# Patient Record
Sex: MALE | Race: WHITE | NOT HISPANIC OR LATINO | Employment: OTHER | URBAN - METROPOLITAN AREA
[De-identification: names, ages, dates, MRNs, and addresses within clinical notes are randomized per-mention and may not be internally consistent; named-entity substitution may affect disease eponyms.]

---

## 2017-01-02 RX ORDER — ATORVASTATIN CALCIUM 10 MG/1
TABLET, FILM COATED ORAL
Qty: 30 TABLET | Refills: 11 | Status: SHIPPED | OUTPATIENT
Start: 2017-01-02 | End: 2017-12-20 | Stop reason: SDUPTHER

## 2017-01-02 RX ORDER — OMEPRAZOLE 40 MG/1
CAPSULE, DELAYED RELEASE ORAL
Qty: 30 CAPSULE | Refills: 11 | Status: SHIPPED | OUTPATIENT
Start: 2017-01-02 | End: 2017-12-20 | Stop reason: SDUPTHER

## 2017-01-04 ENCOUNTER — LAB VISIT (OUTPATIENT)
Dept: LAB | Facility: HOSPITAL | Age: 77
End: 2017-01-04
Attending: INTERNAL MEDICINE
Payer: MEDICARE

## 2017-01-04 DIAGNOSIS — E78.5 HYPERLIPIDEMIA, UNSPECIFIED HYPERLIPIDEMIA TYPE: ICD-10-CM

## 2017-01-04 DIAGNOSIS — D64.9 ANEMIA, UNSPECIFIED TYPE: ICD-10-CM

## 2017-01-04 DIAGNOSIS — Z12.5 SPECIAL SCREENING FOR MALIGNANT NEOPLASM OF PROSTATE: ICD-10-CM

## 2017-01-04 DIAGNOSIS — R73.09 ELEVATED GLUCOSE: ICD-10-CM

## 2017-01-04 LAB
ALBUMIN SERPL BCP-MCNC: 3.9 G/DL
ALP SERPL-CCNC: 58 U/L
ALT SERPL W/O P-5'-P-CCNC: 25 U/L
ANION GAP SERPL CALC-SCNC: 9 MMOL/L
AST SERPL-CCNC: 23 U/L
BASOPHILS # BLD AUTO: 0.05 K/UL
BASOPHILS NFR BLD: 0.9 %
BILIRUB SERPL-MCNC: 0.7 MG/DL
BUN SERPL-MCNC: 14 MG/DL
CALCIUM SERPL-MCNC: 9.5 MG/DL
CHLORIDE SERPL-SCNC: 106 MMOL/L
CHOLEST/HDLC SERPL: 3.4 {RATIO}
CO2 SERPL-SCNC: 26 MMOL/L
COMPLEXED PSA SERPL-MCNC: 0.42 NG/ML
CREAT SERPL-MCNC: 1.1 MG/DL
DIFFERENTIAL METHOD: ABNORMAL
EOSINOPHIL # BLD AUTO: 0.2 K/UL
EOSINOPHIL NFR BLD: 2.7 %
ERYTHROCYTE [DISTWIDTH] IN BLOOD BY AUTOMATED COUNT: 14 %
EST. GFR  (AFRICAN AMERICAN): >60 ML/MIN/1.73 M^2
EST. GFR  (NON AFRICAN AMERICAN): >60 ML/MIN/1.73 M^2
ESTIMATED AVG GLUCOSE: 97 MG/DL
GLUCOSE SERPL-MCNC: 97 MG/DL
HBA1C MFR BLD HPLC: 5 %
HCT VFR BLD AUTO: 44.1 %
HDL/CHOLESTEROL RATIO: 29 %
HDLC SERPL-MCNC: 210 MG/DL
HDLC SERPL-MCNC: 61 MG/DL
HGB BLD-MCNC: 15.3 G/DL
LDLC SERPL CALC-MCNC: 124.4 MG/DL
LYMPHOCYTES # BLD AUTO: 1.7 K/UL
LYMPHOCYTES NFR BLD: 29.2 %
MCH RBC QN AUTO: 32.4 PG
MCHC RBC AUTO-ENTMCNC: 34.7 %
MCV RBC AUTO: 93 FL
MONOCYTES # BLD AUTO: 0.3 K/UL
MONOCYTES NFR BLD: 5.3 %
NEUTROPHILS # BLD AUTO: 3.6 K/UL
NEUTROPHILS NFR BLD: 61.7 %
NONHDLC SERPL-MCNC: 149 MG/DL
PLATELET # BLD AUTO: 175 K/UL
PMV BLD AUTO: 11.2 FL
POTASSIUM SERPL-SCNC: 4.7 MMOL/L
PROT SERPL-MCNC: 7.4 G/DL
RBC # BLD AUTO: 4.72 M/UL
SODIUM SERPL-SCNC: 141 MMOL/L
TRIGL SERPL-MCNC: 123 MG/DL
WBC # BLD AUTO: 5.83 K/UL

## 2017-01-04 PROCEDURE — 84153 ASSAY OF PSA TOTAL: CPT

## 2017-01-04 PROCEDURE — 80061 LIPID PANEL: CPT

## 2017-01-04 PROCEDURE — 80053 COMPREHEN METABOLIC PANEL: CPT

## 2017-01-04 PROCEDURE — 85025 COMPLETE CBC W/AUTO DIFF WBC: CPT

## 2017-01-04 PROCEDURE — 83036 HEMOGLOBIN GLYCOSYLATED A1C: CPT

## 2017-01-04 PROCEDURE — 36415 COLL VENOUS BLD VENIPUNCTURE: CPT

## 2017-01-11 ENCOUNTER — OFFICE VISIT (OUTPATIENT)
Dept: INTERNAL MEDICINE | Facility: CLINIC | Age: 77
End: 2017-01-11
Payer: MEDICARE

## 2017-01-11 VITALS
HEART RATE: 61 BPM | BODY MASS INDEX: 30.16 KG/M2 | HEIGHT: 74 IN | SYSTOLIC BLOOD PRESSURE: 134 MMHG | WEIGHT: 235 LBS | DIASTOLIC BLOOD PRESSURE: 84 MMHG

## 2017-01-11 DIAGNOSIS — E78.5 HYPERLIPIDEMIA, UNSPECIFIED HYPERLIPIDEMIA TYPE: ICD-10-CM

## 2017-01-11 DIAGNOSIS — F32.A DEPRESSION, UNSPECIFIED DEPRESSION TYPE: ICD-10-CM

## 2017-01-11 DIAGNOSIS — K21.9 GASTROESOPHAGEAL REFLUX DISEASE, ESOPHAGITIS PRESENCE NOT SPECIFIED: ICD-10-CM

## 2017-01-11 DIAGNOSIS — I10 ESSENTIAL HYPERTENSION: Primary | ICD-10-CM

## 2017-01-11 PROCEDURE — 99214 OFFICE O/P EST MOD 30 MIN: CPT | Mod: S$PBB,,, | Performed by: INTERNAL MEDICINE

## 2017-01-11 PROCEDURE — 99213 OFFICE O/P EST LOW 20 MIN: CPT | Mod: PBBFAC | Performed by: INTERNAL MEDICINE

## 2017-01-11 PROCEDURE — 82270 OCCULT BLOOD FECES: CPT | Mod: PBBFAC | Performed by: INTERNAL MEDICINE

## 2017-01-11 PROCEDURE — 99999 PR PBB SHADOW E&M-EST. PATIENT-LVL III: CPT | Mod: PBBFAC,,, | Performed by: INTERNAL MEDICINE

## 2017-01-11 RX ORDER — CITALOPRAM 20 MG/1
20 TABLET, FILM COATED ORAL DAILY
Qty: 30 TABLET | Refills: 11 | Status: SHIPPED | OUTPATIENT
Start: 2017-01-11 | End: 2018-01-12

## 2017-01-11 NOTE — PROGRESS NOTES
Subjective:       Patient ID: Adrian Healy is a 76 y.o. male.    Chief Complaint: Follow-up (yearly follow up)    HPI Comments: History of present illness: Patient here for annual follow-up of medical problems including dyslipidemia and hypertension.  He has stable ALLERGIES and stable depression.  He says he feels well and has no active or acute complaints.  No GI or  complaints.  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  Tolerating his meds well.  Labs were stable.  He has had a flu shot and pneumonia vaccine.    Review of Systems   Constitutional: Negative for chills, fatigue, fever and unexpected weight change.   HENT: Negative for ear pain and sore throat.    Eyes: Negative for pain and visual disturbance.   Respiratory: Negative for cough, shortness of breath and wheezing.    Cardiovascular: Negative for chest pain and leg swelling.   Gastrointestinal: Negative for abdominal pain, constipation, diarrhea, nausea and vomiting.   Genitourinary: Negative for difficulty urinating, frequency and hematuria.        Once per night nocturia   Musculoskeletal: Negative for arthralgias, back pain, myalgias, neck pain and neck stiffness.   Skin: Negative for rash and wound.   Neurological: Negative for dizziness, numbness and headaches.   Hematological: Negative for adenopathy.   Psychiatric/Behavioral: Negative for dysphoric mood. The patient is not nervous/anxious.        Objective:      Physical Exam   Constitutional: He is oriented to person, place, and time. He appears well-developed and well-nourished. No distress.   HENT:   Head: Normocephalic and atraumatic.   Right Ear: External ear normal.   Left Ear: External ear normal.   Nose: Nose normal. No rhinorrhea.   Mouth/Throat: Oropharynx is clear and moist and mucous membranes are normal. No oropharyngeal exudate.   Eyes: Conjunctivae and EOM are normal. Pupils are equal, round, and reactive to light.   Neck: Normal range of motion. Neck supple.  No JVD present. No thyromegaly present.   No supraclavicular nodes palpated bilaterally.    Cardiovascular: Normal rate, regular rhythm, normal heart sounds and intact distal pulses.    No murmur heard.  Pulmonary/Chest: Effort normal and breath sounds normal. He has no wheezes. He has no rales.   Abdominal: Soft. Bowel sounds are normal. He exhibits no distension and no mass. There is no tenderness.   Genitourinary: Rectum normal. Rectal exam shows no tenderness and guaiac negative stool. Prostate is enlarged (mild). Prostate is not tender.   Musculoskeletal: Normal range of motion. He exhibits no edema or tenderness.   Lymphadenopathy:     He has no cervical adenopathy.        Right: No supraclavicular adenopathy present.        Left: No supraclavicular adenopathy present.   Neurological: He is alert and oriented to person, place, and time. He has normal reflexes. No cranial nerve deficit.   Reflex Scores:       Bicep reflexes are 2+ on the right side and 2+ on the left side.       Patellar reflexes are 2+ on the right side and 2+ on the left side.  Skin: Skin is warm and dry. No rash noted.   Psychiatric: He has a normal mood and affect. His behavior is normal. He does not exhibit a depressed mood.       Assessment:       1. Essential hypertension    2. Hyperlipidemia, unspecified hyperlipidemia type    3. Gastroesophageal reflux disease, esophagitis presence not specified    4. Depression, unspecified depression type        Plan:       Adrian was seen today for follow-up.    Diagnoses and all orders for this visit:    Essential hypertension    Hyperlipidemia, unspecified hyperlipidemia type    Gastroesophageal reflux disease, esophagitis presence not specified    Depression, unspecified depression type  Comments:  Stable on medication    Other orders  -     citalopram (CELEXA) 20 MG tablet; Take 1 tablet (20 mg total) by mouth once daily.        Follow up annually

## 2017-01-30 RX ORDER — CITALOPRAM 20 MG/1
TABLET, FILM COATED ORAL
Qty: 30 TABLET | Refills: 0 | Status: SHIPPED | OUTPATIENT
Start: 2017-01-30 | End: 2017-12-26 | Stop reason: SDUPTHER

## 2017-11-17 ENCOUNTER — HOSPITAL ENCOUNTER (EMERGENCY)
Facility: HOSPITAL | Age: 77
Discharge: HOME | End: 2017-11-17
Attending: FAMILY MEDICINE
Payer: MEDICARE

## 2017-11-17 VITALS
BODY MASS INDEX: 29.52 KG/M2 | RESPIRATION RATE: 18 BRPM | HEART RATE: 80 BPM | DIASTOLIC BLOOD PRESSURE: 89 MMHG | HEIGHT: 74 IN | SYSTOLIC BLOOD PRESSURE: 168 MMHG | TEMPERATURE: 99 F | OXYGEN SATURATION: 96 % | WEIGHT: 230 LBS

## 2017-11-17 DIAGNOSIS — S09.90XA HEAD TRAUMA: Primary | ICD-10-CM

## 2017-11-17 PROCEDURE — 99284 EMERGENCY DEPT VISIT MOD MDM: CPT

## 2017-11-17 PROCEDURE — 99283 EMERGENCY DEPT VISIT LOW MDM: CPT | Mod: ,,, | Performed by: PHYSICIAN ASSISTANT

## 2017-11-17 NOTE — ED TRIAGE NOTES
Fell Wednesday night striking the back of his head on the night stand.  Presents today with a headache and neck pain.      GENERAL: The patient is an elderly in no apparent distress. Alert and oriented x4.                                                HEENT: Head is normocephalic and atraumatic. Extraocular muscles are intact. Pupils are reactive to light and accommodation.  Right pupil round, however, left pupil is irregular shaped due to sticking a knife in his eye as a child.  States that he has normal vision out of his eye. Nares appeared normal. Mouth is well hydrated and without lesions. Mucous membranes are moist. Posterior pharynx clear of any exudate or lesions.    NECK: Supple. No carotid bruits. No lymphadenopathy or thyromegaly.    LUNGS: Clear to auscultation.    HEART: Regular rate and rhythm without murmur.     ABDOMEN: Soft, nontender, and nondistended. Positive bowel sounds. No hepatosplenomegaly was noted.     EXTREMITIES: Without any cyanosis, clubbing, rash, lesions or edema.     NEUROLOGIC: Cranial nerves II through XII are grossly intact.     PSYCHIATRIC: Flat affect, but denies suicidal or homicidal ideations.    SKIN: No ulceration or induration present.

## 2017-11-18 NOTE — ED PROVIDER NOTES
"Encounter Date: 11/17/2017       History     Chief Complaint   Patient presents with    Head Injury     pt states he hit his head on nightstand Wednesday night when he fell trying to put his pants on.  Pt states he woke up around 2am last night and has had headache since.  Denies LOC.      Patient is a 77-year-old male that presents the ED with head injury and neck stiffness.  Patient states on Wednesday, he was putting on his pants, leaned over, but fell forward and hit his head on the wooden table.  He is unsure of LOC but believes he did not have any.  He states this morning, he woke up with 10/10 "pounding" head pains that resolved after taking Tylenol ×2.  He has been having constant neck stiffness and pain that is worse with neck rotation.  He denies any fever or chills, current headache, dizziness, blurred vision, extremity paresthesias, difficulty ambulating.  He denies any blood thinner use.  He has no other complaints at this time.          Review of patient's allergies indicates:  No Known Allergies  Past Medical History:   Diagnosis Date    GERD (gastroesophageal reflux disease)     HEARING LOSS     Hyperlipidemia     Hypertension      Past Surgical History:   Procedure Laterality Date    EYE SURGERY       Family History   Problem Relation Age of Onset    Heart disease Brother      pacemaker     Social History   Substance Use Topics    Smoking status: Former Smoker     Packs/day: 1.00     Years: 30.00     Types: Cigarettes     Quit date: 12/12/1997    Smokeless tobacco: Former User     Quit date: 12/10/1997    Alcohol use 3.5 oz/week     7 Standard drinks or equivalent per week     Review of Systems   Constitutional: Negative for chills and fever.   HENT: Negative for nosebleeds and sore throat.    Eyes: Negative for photophobia and visual disturbance.   Respiratory: Negative for cough and shortness of breath.    Cardiovascular: Negative for chest pain.   Gastrointestinal: Negative for nausea. "   Endocrine: Negative for polyuria.   Genitourinary: Negative for dysuria and hematuria.   Musculoskeletal: Positive for neck pain and neck stiffness. Negative for back pain.   Skin: Negative for rash.   Neurological: Positive for headaches (resolved). Negative for dizziness, weakness and numbness.   Hematological: Does not bruise/bleed easily.       Physical Exam     Initial Vitals [11/17/17 1513]   BP Pulse Resp Temp SpO2   (!) 176/101 96 16 98.1 °F (36.7 °C) 97 %      MAP       126         Physical Exam    Vitals reviewed.  Constitutional: He appears well-developed and well-nourished. He is not diaphoretic. No distress.   HENT:   Head: Normocephalic and atraumatic.   Nose: Nose normal.   Eyes: Conjunctivae and EOM are normal.   Neck: Normal range of motion. No thyroid mass present. No muscular tenderness present. No neck rigidity.       Cardiovascular: Normal rate, regular rhythm and normal heart sounds. Exam reveals no friction rub.    No murmur heard.  Pulmonary/Chest: Breath sounds normal. No respiratory distress. He has no wheezes. He has no rales.   Abdominal: Soft. Bowel sounds are normal. He exhibits no distension. There is no tenderness.   Musculoskeletal: Normal range of motion.   Neurological: He is alert and oriented to person, place, and time. He has normal strength. No sensory deficit.   FROM of upper and lower extremities.  Normal finger to nose and rapid alternating hand movements.  Normal gait.  Negative pronator's and Romberg's.   Skin: Skin is warm and dry. No erythema.   Psychiatric: He has a normal mood and affect. Thought content normal.         ED Course   Procedures  Labs Reviewed - No data to display          Medical Decision Making:   History:   Old Medical Records: I decided to obtain old medical records.  Clinical Tests:   Radiological Study: Ordered and Reviewed    Imaging Results          CT Head Without Contrast (Final result)  Result time 11/17/17 19:10:30    Final result by Luis  SHARI Hutchinson MD (11/17/17 19:10:30)                 Impression:         No evidence of major vascular distribution infarct, hemorrhage, or calvarial fractures.    Cavum septum pellucidum, anatomical variant.  ______________________________________     Electronically signed by resident: AIXA PORTILLO MD  Date:     11/17/17  Time:    19:04            As the supervising and teaching physician, I personally reviewed the images and resident's interpretation and I agree with the findings.          Electronically signed by: Luis Hutchinson  Date:     11/17/17  Time:    19:10              Narrative:    CT head without contrast    11/17/17 18:30:00    Accession# [id number]    CLINICAL INDICATION: 77 year old M with  head trauma.    TECHNIQUE: Axial CT images obtained throughout the region of the head without the use of intravenous contrast. Axial, sagittal and coronal reconstructions were performed.    COMPARISON: No priors.    FINDINGS:    The ventricles are normal in size without evidence of hydrocephalus. Cavum septum pellucidum is noted, anatomical variant. Prominent sulcal space noted in the parasagittal anterior left frontal lobe.    The brain appears within normal limits. No parenchymal mass, hemorrhage, edema or major vascular distribution infarct.    No extra-axial blood or fluid collections. There are postoperative changes of cataract surgery bilaterally.    The cranium appears intact. Mastoid air cells and paranasal sinuses are essentially clear. Incidentally noted calcified nodule in the left posterior scalp soft tissues.                                 APC / Resident Notes:   No cervical spine bony tenderness.  He has bilateral neck muscular tenderness.  Limited ROM secondary to pain.  Skin without abnormality.  I have considered but do not suspect cervical fracture or dislocation.  I will CT his head given that he is unsure of LOC and his age, although he presents with normal neuro exam.    CT head without acute  process.    Patient updated with results.  I will treat him for bony contusion of his head and neck stiffness secondary to muscular strain.  He is to continue OTC acetaminophen.  He would not like any muscle relaxers at this time.  He is to follow-up with his PCP if his symptoms do not improve.  Return to ED precaution given.  All questions answered.  Patient is comfortable with plan and stable for discharge.  I have reviewed patient's chart and discussed this case with my supervising M.BRUNO.              ED Course      Clinical Impression:   The encounter diagnosis was Head trauma.    Disposition:   Disposition: Discharged  Condition: Stable                        Elayne Lund PA-C  11/17/17 2019

## 2017-11-18 NOTE — DISCHARGE INSTRUCTIONS
Continue to take Tylenol on a scheduled basis as directed for pain relief.  Follow-up with your primary care physician if your symptoms don't improve or worsen in 5 days.  Return to emergency department for signs of fever, chills, difficulty walking, weakness, or worsening headache.    Future Appointments  Date Time Provider Department Center   12/13/2017 9:15 AM Luis Castillo MD [facility] ORTHO [address]       Our goal in the emergency department is to always give you outstanding care and exceptional service. You may receive a survey by mail or e-mail in the next week regarding your experience in our ED. We would greatly appreciate your completing and returning the survey. Your feedback provides us with a way to recognize our staff who give very good care and it helps us learn how to improve when your experience was below our aspiration of excellence.

## 2017-11-21 ENCOUNTER — TELEPHONE (OUTPATIENT)
Dept: INTERNAL MEDICINE | Facility: CLINIC | Age: 77
End: 2017-11-21

## 2017-11-21 DIAGNOSIS — K21.9 GASTROESOPHAGEAL REFLUX DISEASE, ESOPHAGITIS PRESENCE NOT SPECIFIED: ICD-10-CM

## 2017-11-21 DIAGNOSIS — Z12.5 SCREENING FOR PROSTATE CANCER: ICD-10-CM

## 2017-11-21 DIAGNOSIS — R73.09 ELEVATED GLUCOSE: ICD-10-CM

## 2017-11-21 DIAGNOSIS — I10 ESSENTIAL HYPERTENSION: ICD-10-CM

## 2017-11-21 DIAGNOSIS — E78.5 HYPERLIPIDEMIA, UNSPECIFIED HYPERLIPIDEMIA TYPE: Primary | ICD-10-CM

## 2017-11-21 NOTE — TELEPHONE ENCOUNTER
----- Message from Flores Nolasco sent at 11/21/2017  1:06 PM CST -----  Contact: [phone]  Doctor appointment and lab have been scheduled.  Please link lab orders to the lab appointment.  Date of doctor appointment:  1-12  Physical or EP:  physical  Date of lab appointment:  1-47  Comments:

## 2017-12-05 DIAGNOSIS — M25.561 PAIN IN BOTH KNEES, UNSPECIFIED CHRONICITY: Primary | ICD-10-CM

## 2017-12-05 DIAGNOSIS — M25.562 PAIN IN BOTH KNEES, UNSPECIFIED CHRONICITY: Primary | ICD-10-CM

## 2017-12-13 ENCOUNTER — OFFICE VISIT (OUTPATIENT)
Dept: ORTHOPEDICS | Facility: CLINIC | Age: 77
End: 2017-12-13
Payer: MEDICARE

## 2017-12-13 ENCOUNTER — HOSPITAL ENCOUNTER (OUTPATIENT)
Dept: RADIOLOGY | Facility: HOSPITAL | Age: 77
Discharge: HOME | End: 2017-12-13
Attending: ORTHOPAEDIC SURGERY
Payer: MEDICARE

## 2017-12-13 VITALS — HEIGHT: 74 IN | BODY MASS INDEX: 29.88 KG/M2 | WEIGHT: 232.81 LBS

## 2017-12-13 DIAGNOSIS — M17.12 PRIMARY OSTEOARTHRITIS OF LEFT KNEE: ICD-10-CM

## 2017-12-13 DIAGNOSIS — M17.11 PRIMARY OSTEOARTHRITIS OF RIGHT KNEE: Primary | ICD-10-CM

## 2017-12-13 DIAGNOSIS — M25.562 PAIN IN BOTH KNEES, UNSPECIFIED CHRONICITY: ICD-10-CM

## 2017-12-13 DIAGNOSIS — M25.561 PAIN IN BOTH KNEES, UNSPECIFIED CHRONICITY: ICD-10-CM

## 2017-12-13 PROCEDURE — 73562 X-RAY EXAM OF KNEE 3: CPT | Mod: 26,50,, | Performed by: RADIOLOGY

## 2017-12-13 PROCEDURE — 99999 PR PBB SHADOW E&M-EST. PATIENT-LVL II: CPT | Mod: PBBFAC,,, | Performed by: ORTHOPAEDIC SURGERY

## 2017-12-13 PROCEDURE — 73562 X-RAY EXAM OF KNEE 3: CPT | Mod: TC,50

## 2017-12-13 PROCEDURE — 99203 OFFICE O/P NEW LOW 30 MIN: CPT | Mod: S$PBB,,, | Performed by: ORTHOPAEDIC SURGERY

## 2017-12-13 PROCEDURE — 99212 OFFICE O/P EST SF 10 MIN: CPT | Mod: PBBFAC,25 | Performed by: ORTHOPAEDIC SURGERY

## 2017-12-13 RX ORDER — HYALURONATE SODIUM 20 MG/2 ML
40 SYRINGE (ML) INTRAARTICULAR WEEKLY
Status: DISCONTINUED | OUTPATIENT
Start: 2017-12-13 | End: 2019-03-18

## 2017-12-13 NOTE — PROGRESS NOTES
Subjective:      Patient ID: Adrian Healy is a 77 y.o. male.    Chief Complaint: bilateral knee    HPI     Adrian Healy is a 77 year old male here with a 15 years history of bilateral (right>left) knee pain. The patient is a  retiree. There was not a history of trauma.  The pain is moderate The pain is located in the medial aspect of the knee. There is is radiation.  The pain radaites to the proximal anterior tibia.  There is not catching or locking.   The pain is described as throbbing. The patient has had prior right knee arthroscopy in 2002. It is aggravated by walking but especially when he changes directions.  IF he is walking straight ahead then it does not bother him as much.  It is alleviated by rest. There is not numbness or tingling of the lower extremity.  There is not back pain.  He  has not tried medications or injections.  He does have difficulty getting in or out of a car, getting dressed, or going up or down stairs.  The patient does not use an assistive device.     Past Medical History:   Diagnosis Date    GERD (gastroesophageal reflux disease)     HEARING LOSS     Hyperlipidemia     Hypertension        Current Outpatient Prescriptions on File Prior to Visit   Medication Sig Dispense Refill    atorvastatin (LIPITOR) 10 MG tablet TAKE 1 TABLET (10 MG TOTAL) BY MOUTH ONCE DAILY. 30 tablet 11    cetirizine (ZYRTEC) 5 MG tablet Take 5 mg by mouth once daily.      citalopram (CELEXA) 20 MG tablet Take 1 tablet (20 mg total) by mouth once daily. 30 tablet 11    citalopram (CELEXA) 20 MG tablet TAKE 1 TABLET (20 MG TOTAL) BY MOUTH ONCE DAILY. 30 tablet 0    omeprazole (PRILOSEC) 40 MG capsule TAKE 1 CAPSULE (40 MG TOTAL) BY MOUTH ONCE DAILY. 30 capsule 11     No current facility-administered medications on file prior to visit.        Past Surgical History:   Procedure Laterality Date    EYE SURGERY         Family History   Problem Relation Age of Onset    Heart disease Brother      pacemaker        Social History     Social History    Marital status:      Spouse name: N/A    Number of children: N/A    Years of education: N/A     Occupational History    Not on file.     Social History Main Topics    Smoking status: Former Smoker     Packs/day: 1.00     Years: 30.00     Types: Cigarettes     Quit date: 12/12/1997    Smokeless tobacco: Former User     Quit date: 12/10/1997    Alcohol use 3.5 oz/week     7 Standard drinks or equivalent per week    Drug use: No    Sexual activity: No     Other Topics Concern    Not on file     Social History Narrative    No narrative on file       Review of Systems   Constitution: Negative for chills, fever and night sweats.   HENT: Negative for hearing loss.    Eyes: Negative for blurred vision and double vision.   Cardiovascular: Negative for chest pain, claudication and leg swelling.   Respiratory: Negative for shortness of breath.    Endocrine: Negative for polydipsia, polyphagia and polyuria.   Hematologic/Lymphatic: Negative for adenopathy and bleeding problem. Does not bruise/bleed easily.   Skin: Negative for poor wound healing.   Musculoskeletal: Positive for joint pain.   Gastrointestinal: Negative for diarrhea and heartburn.   Genitourinary: Negative for bladder incontinence.   Neurological: Negative for focal weakness, headaches, light-headedness, numbness, paresthesias and sensory change.   Psychiatric/Behavioral: The patient is not nervous/anxious.    Allergic/Immunologic: Negative for persistent infections.              Objective:        Body mass index is 29.89 kg/m².    General    Constitutional: He is oriented to person, place, and time. He appears well-developed and well-nourished.   HENT:   Head: Atraumatic.   Eyes: EOM are normal.   Neck: Normal range of motion.   Cardiovascular: Normal rate and intact distal pulses.    Pulmonary/Chest: Effort normal.   Abdominal: Soft. He exhibits no distension. There is no tenderness.   Neurological:  He is alert and oriented to person, place, and time.   Psychiatric: He has a normal mood and affect.     General Musculoskeletal Exam   Gait: normal       Right Knee Exam     Inspection   Erythema: absent  Scars: present  Swelling: present  Effusion: present  Deformity: deformity (mild varus)  Bruising: absent    Tenderness   The patient is tender to palpation of the medial joint line.    Range of Motion   Extension: 0   Flexion: 120     Tests   Ligament Examination Lachman: normal (-1 to 2mm) PCL-Posterior Drawer: normal (0 to 2mm)     MCL - Valgus: normal (0 to 2mm)  LCL - Varus: normalPivot Shift: normal (Equal)  Patella   Patellar Apprehension: negative  Passive Patellar Tilt: neutral  Patellar Tracking: normal  Patellar Grind: negative    Other   Sensation: normal    Left Knee Exam     Inspection   Erythema: absent  Scars: absent  Swelling: absent  Effusion: absent  Deformity: deformity  Bruising: absent    Tenderness   The patient tender to palpation of the medial joint line.    Range of Motion   Extension: 0   Flexion: 120     Tests   Stability Lachman: normal (-1 to 2mm)   MCL - Valgus: normal (0 to 2mm)  LCL - Varus: normal (0 to 2mm)Pivot Shift: normal (Equal)  Patella   Patellar Apprehension: negative  Passive Patellar Tilt: neutral  Patellar Tracking: normal  Patellar Grind: negative    Other   Sensation: normal    Muscle Strength   Right Lower Extremity   Hip Abduction: 5/5   Quadriceps:  5/5   Hamstrin/5   Left Lower Extremity   Hip Abduction: 5/5   Quadriceps:  5/5   Hamstrin/5     Reflexes     Left Side  Quadriceps:  2+  Achilles:  2+    Right Side   Quadriceps:  2+  Achilles:  2+    Vascular Exam     Right Pulses  Dorsalis Pedis:      2+  Posterior Tibial:      2+        Left Pulses  Dorsalis Pedis:      2+  Posterior Tibial:      2+        Edema  Right Lower Leg: absent  Left Lower Leg: absent      Radiographs taken today and reviewed by me demonstrate moderate arthritic change of the  bilateral KNEE(s). There  is not bone destruction.  There is not a fracture. The changes are tricompartmental.          Assessment:       Encounter Diagnoses   Name Primary?    Primary osteoarthritis of right knee Yes    Primary osteoarthritis of left knee           Plan:       Adrian was seen today for bilateral knee.    Diagnoses and all orders for this visit:    Primary osteoarthritis of right knee  -     Prior Authorization Order    Primary osteoarthritis of left knee  -     Prior Authorization Order    Other orders  -     EUFLEXXA 10 mg/mL(mw 2.4 -3.6 million) injection Syrg 40 mg; Inject 4 mLs (40 mg total) into the articular space once a week.        Treatment options have been discussed.  We have decided to proceed with bilateral Euflexxa injections.  The risk of pseudoseptic reactions were discussed, as was the minimal risk of infection.  Adrian Healy will return for his first injection..

## 2017-12-20 RX ORDER — ATORVASTATIN CALCIUM 10 MG/1
TABLET, FILM COATED ORAL
Qty: 30 TABLET | Refills: 11 | Status: SHIPPED | OUTPATIENT
Start: 2017-12-20 | End: 2018-01-12 | Stop reason: SDUPTHER

## 2017-12-20 RX ORDER — OMEPRAZOLE 40 MG/1
CAPSULE, DELAYED RELEASE ORAL
Qty: 30 CAPSULE | Refills: 11 | Status: SHIPPED | OUTPATIENT
Start: 2017-12-20 | End: 2018-01-12 | Stop reason: SDUPTHER

## 2017-12-29 ENCOUNTER — OFFICE VISIT (OUTPATIENT)
Dept: ORTHOPEDICS | Facility: CLINIC | Age: 77
End: 2017-12-29
Payer: MEDICARE

## 2017-12-29 VITALS
WEIGHT: 236.13 LBS | HEART RATE: 89 BPM | BODY MASS INDEX: 30.31 KG/M2 | SYSTOLIC BLOOD PRESSURE: 128 MMHG | DIASTOLIC BLOOD PRESSURE: 88 MMHG | HEIGHT: 74 IN

## 2017-12-29 DIAGNOSIS — M17.0 PRIMARY OSTEOARTHRITIS OF BOTH KNEES: Primary | ICD-10-CM

## 2017-12-29 PROCEDURE — 99999 PR PBB SHADOW E&M-EST. PATIENT-LVL III: CPT | Mod: PBBFAC,,, | Performed by: PHYSICIAN ASSISTANT

## 2017-12-29 PROCEDURE — 20610 DRAIN/INJ JOINT/BURSA W/O US: CPT | Mod: 50,PBBFAC | Performed by: PHYSICIAN ASSISTANT

## 2017-12-29 PROCEDURE — 99213 OFFICE O/P EST LOW 20 MIN: CPT | Mod: PBBFAC,25 | Performed by: PHYSICIAN ASSISTANT

## 2017-12-29 PROCEDURE — 20610 DRAIN/INJ JOINT/BURSA W/O US: CPT | Mod: 50,S$PBB,, | Performed by: PHYSICIAN ASSISTANT

## 2017-12-29 PROCEDURE — 99499 UNLISTED E&M SERVICE: CPT | Mod: S$PBB,,, | Performed by: PHYSICIAN ASSISTANT

## 2017-12-29 RX ADMIN — Medication 40 MG: at 02:12

## 2017-12-29 NOTE — LETTER
December 29, 2017      Luis Castillo MD  [address]  [facility] [address]           [facility] - Orthopedics  [address]  [facility] [address]  Phone: [phone]          Patient: Adrian Healy   MR Number: [id number]   YOB: 1940   Date of Visit: 12/29/2017       Dear Dr. Luis Castillo:    Thank you for referring Adrian Healy to me for evaluation. Attached you will find relevant portions of my assessment and plan of care.    If you have questions, please do not hesitate to call me. I look forward to following Adrian Healy along with you.    Sincerely,    Syd Molina PA-C    Enclosure  CC:  No Recipients    If you would like to receive this communication electronically, please contact [email or web address] or [phone] to request more information on [employer] Link access.    For providers and/or their staff who would like to refer a patient to [facility], please contact us through our one-stop-shop provider referral line, [facility], at [phone].    If you feel you have received this communication in error or would no longer like to receive these types of communications, please e-mail [email or web address]

## 2017-12-29 NOTE — PROGRESS NOTES
Adrian Healy is a 77 y.o. year old his here today for his 1st Euflexxa injection for degenerative changes of his bilateral knee . he was last seen and treated in the clinic on Visit date not found. There has been no significant change in his medical status since his last visit. No Fever, chills, malaise, or unexplained weight change.      Allergies, Medications, past medical and surgical history were reviewed .    Examination of the knee demonstrates  No evidence of edema, erythema , echymosis strength and range of motion are unchanged from previous visit.    The injection site was identified and the skin was prepared with a betadine solution. The  bilateral knee  joint was injected with 2 ml of Euflexxa solution under sterile technique. Adrian Healy tolerated the procedure well, he was advised to rest the knee today, ice and elevation. I may take 3 -6 weeks following the last injection to get the full benefit of the medication.  I will see him back in 1 week. Sooner if he has any problems or concerns.           .

## 2018-01-03 RX ORDER — CITALOPRAM 20 MG/1
20 TABLET, FILM COATED ORAL DAILY
Qty: 90 TABLET | Refills: 0 | Status: SHIPPED | OUTPATIENT
Start: 2018-01-03 | End: 2018-01-12 | Stop reason: SDUPTHER

## 2018-01-04 ENCOUNTER — LAB VISIT (OUTPATIENT)
Dept: LAB | Facility: HOSPITAL | Age: 78
End: 2018-01-04
Payer: MEDICARE

## 2018-01-04 DIAGNOSIS — Z12.5 SCREENING FOR PROSTATE CANCER: ICD-10-CM

## 2018-01-04 DIAGNOSIS — K21.9 GASTROESOPHAGEAL REFLUX DISEASE, ESOPHAGITIS PRESENCE NOT SPECIFIED: ICD-10-CM

## 2018-01-04 DIAGNOSIS — R73.09 ELEVATED GLUCOSE: ICD-10-CM

## 2018-01-04 DIAGNOSIS — E78.5 HYPERLIPIDEMIA, UNSPECIFIED HYPERLIPIDEMIA TYPE: ICD-10-CM

## 2018-01-04 DIAGNOSIS — I10 ESSENTIAL HYPERTENSION: ICD-10-CM

## 2018-01-04 LAB
ALBUMIN SERPL BCP-MCNC: 4.2 G/DL
ALP SERPL-CCNC: 63 U/L
ALT SERPL W/O P-5'-P-CCNC: 30 U/L
ANION GAP SERPL CALC-SCNC: 10 MMOL/L
AST SERPL-CCNC: 23 U/L
BASOPHILS # BLD AUTO: 0.03 K/UL
BASOPHILS NFR BLD: 0.6 %
BILIRUB SERPL-MCNC: 0.5 MG/DL
BUN SERPL-MCNC: 12 MG/DL
CALCIUM SERPL-MCNC: 9.7 MG/DL
CHLORIDE SERPL-SCNC: 106 MMOL/L
CHOLEST SERPL-MCNC: 201 MG/DL
CHOLEST/HDLC SERPL: 3.1 {RATIO}
CO2 SERPL-SCNC: 27 MMOL/L
COMPLEXED PSA SERPL-MCNC: 0.33 NG/ML
CREAT SERPL-MCNC: 1.1 MG/DL
DIFFERENTIAL METHOD: ABNORMAL
EOSINOPHIL # BLD AUTO: 0.2 K/UL
EOSINOPHIL NFR BLD: 3.2 %
ERYTHROCYTE [DISTWIDTH] IN BLOOD BY AUTOMATED COUNT: 13.8 %
EST. GFR  (AFRICAN AMERICAN): >60 ML/MIN/1.73 M^2
EST. GFR  (NON AFRICAN AMERICAN): >60 ML/MIN/1.73 M^2
ESTIMATED AVG GLUCOSE: 94 MG/DL
GLUCOSE SERPL-MCNC: 103 MG/DL
HBA1C MFR BLD HPLC: 4.9 %
HCT VFR BLD AUTO: 44.7 %
HDLC SERPL-MCNC: 64 MG/DL
HDLC SERPL: 31.8 %
HGB BLD-MCNC: 15.3 G/DL
LDLC SERPL CALC-MCNC: 112.6 MG/DL
LYMPHOCYTES # BLD AUTO: 1.6 K/UL
LYMPHOCYTES NFR BLD: 29.7 %
MCH RBC QN AUTO: 32.4 PG
MCHC RBC AUTO-ENTMCNC: 34.2 G/DL
MCV RBC AUTO: 95 FL
MONOCYTES # BLD AUTO: 0.4 K/UL
MONOCYTES NFR BLD: 8.3 %
NEUTROPHILS # BLD AUTO: 3.1 K/UL
NEUTROPHILS NFR BLD: 58 %
NONHDLC SERPL-MCNC: 137 MG/DL
PLATELET # BLD AUTO: 199 K/UL
PMV BLD AUTO: 10.7 FL
POTASSIUM SERPL-SCNC: 4.4 MMOL/L
PROT SERPL-MCNC: 7.4 G/DL
RBC # BLD AUTO: 4.72 M/UL
SODIUM SERPL-SCNC: 143 MMOL/L
TRIGL SERPL-MCNC: 122 MG/DL
WBC # BLD AUTO: 5.29 K/UL

## 2018-01-04 PROCEDURE — 83036 HEMOGLOBIN GLYCOSYLATED A1C: CPT

## 2018-01-04 PROCEDURE — 80053 COMPREHEN METABOLIC PANEL: CPT

## 2018-01-04 PROCEDURE — 80061 LIPID PANEL: CPT

## 2018-01-04 PROCEDURE — 36415 COLL VENOUS BLD VENIPUNCTURE: CPT

## 2018-01-04 PROCEDURE — 84153 ASSAY OF PSA TOTAL: CPT

## 2018-01-04 PROCEDURE — 85025 COMPLETE CBC W/AUTO DIFF WBC: CPT

## 2018-01-05 ENCOUNTER — OFFICE VISIT (OUTPATIENT)
Dept: ORTHOPEDICS | Facility: CLINIC | Age: 78
End: 2018-01-05
Payer: MEDICARE

## 2018-01-05 DIAGNOSIS — M17.0 PRIMARY OSTEOARTHRITIS OF BOTH KNEES: Primary | ICD-10-CM

## 2018-01-05 PROCEDURE — 20610 DRAIN/INJ JOINT/BURSA W/O US: CPT | Mod: 50,S$PBB,, | Performed by: PHYSICIAN ASSISTANT

## 2018-01-05 PROCEDURE — 99499 UNLISTED E&M SERVICE: CPT | Mod: S$PBB,,, | Performed by: PHYSICIAN ASSISTANT

## 2018-01-05 PROCEDURE — 99212 OFFICE O/P EST SF 10 MIN: CPT | Mod: PBBFAC | Performed by: PHYSICIAN ASSISTANT

## 2018-01-05 PROCEDURE — 20610 DRAIN/INJ JOINT/BURSA W/O US: CPT | Mod: 50,PBBFAC | Performed by: PHYSICIAN ASSISTANT

## 2018-01-05 PROCEDURE — 99999 PR PBB SHADOW E&M-EST. PATIENT-LVL II: CPT | Mod: PBBFAC,,, | Performed by: PHYSICIAN ASSISTANT

## 2018-01-05 RX ADMIN — Medication 40 MG: at 02:01

## 2018-01-05 NOTE — LETTER
January 5, 2018      Luis Castillo MD  [address]  [facility] [address]           [facility] - Orthopedics  [address]  [facility] [address]  Phone: [phone]          Patient: Adrian Healy   MR Number: [id number]   YOB: 1940   Date of Visit: 1/5/2018       Dear Dr. Luis Castillo:    Thank you for referring Adrian Healy to me for evaluation. Attached you will find relevant portions of my assessment and plan of care.    If you have questions, please do not hesitate to call me. I look forward to following Adrian Healy along with you.    Sincerely,    Syd Molina PA-C    Enclosure  CC:  No Recipients    If you would like to receive this communication electronically, please contact [email or web address] or [phone] to request more information on [employer] Link access.    For providers and/or their staff who would like to refer a patient to [facility], please contact us through our one-stop-shop provider referral line, [facility] , at [phone].    If you feel you have received this communication in error or would no longer like to receive these types of communications, please e-mail [email or web address]

## 2018-01-05 NOTE — PROGRESS NOTES
Adrian Healy is a 77 y.o. year old his here today for his 2nd Euflexxa injection for degenerative changes of his bilateral knee . he was last seen and treated in the clinic on 12/29/2017. There has been no significant change in his medical status since his last visit. No Fever, chills, malaise, or unexplained weight change.      Allergies, Medications, past medical and surgical history were reviewed .    Examination of the knee demonstrates  No evidence of edema, erythema , echymosis strength and range of motion are unchanged from previous visit.    The injection site was identified and the skin was prepared with a betadine solution. The  bilateral knee  joint was injected with 2 ml of Euflexxa solution under sterile technique. Adrian Healy tolerated the procedure well, he was advised to rest the knee today, ice and elevation. I may take 3 -6 weeks following the last injection to get the full benefit of the medication.  I will see him back in 1 week. Sooner if he has any problems or concerns.           .

## 2018-01-12 ENCOUNTER — OFFICE VISIT (OUTPATIENT)
Dept: INTERNAL MEDICINE | Facility: CLINIC | Age: 78
End: 2018-01-12
Payer: MEDICARE

## 2018-01-12 ENCOUNTER — OFFICE VISIT (OUTPATIENT)
Dept: ORTHOPEDICS | Facility: CLINIC | Age: 78
End: 2018-01-12
Payer: MEDICARE

## 2018-01-12 VITALS
DIASTOLIC BLOOD PRESSURE: 90 MMHG | HEIGHT: 74 IN | WEIGHT: 238.44 LBS | SYSTOLIC BLOOD PRESSURE: 136 MMHG | HEART RATE: 68 BPM | BODY MASS INDEX: 30.6 KG/M2

## 2018-01-12 VITALS
DIASTOLIC BLOOD PRESSURE: 88 MMHG | WEIGHT: 239.19 LBS | HEART RATE: 73 BPM | SYSTOLIC BLOOD PRESSURE: 138 MMHG | HEIGHT: 74 IN | BODY MASS INDEX: 30.7 KG/M2

## 2018-01-12 DIAGNOSIS — H90.3 SENSORINEURAL HEARING LOSS (SNHL) OF BOTH EARS: ICD-10-CM

## 2018-01-12 DIAGNOSIS — D36.9 ADENOMATOUS POLYPS: ICD-10-CM

## 2018-01-12 DIAGNOSIS — K21.9 GASTROESOPHAGEAL REFLUX DISEASE, ESOPHAGITIS PRESENCE NOT SPECIFIED: ICD-10-CM

## 2018-01-12 DIAGNOSIS — M17.0 PRIMARY OSTEOARTHRITIS OF BOTH KNEES: Primary | ICD-10-CM

## 2018-01-12 DIAGNOSIS — Z12.5 SCREENING FOR PROSTATE CANCER: ICD-10-CM

## 2018-01-12 DIAGNOSIS — Z12.11 COLON CANCER SCREENING: ICD-10-CM

## 2018-01-12 DIAGNOSIS — E78.5 HYPERLIPIDEMIA, UNSPECIFIED HYPERLIPIDEMIA TYPE: ICD-10-CM

## 2018-01-12 DIAGNOSIS — I10 ESSENTIAL HYPERTENSION: Primary | ICD-10-CM

## 2018-01-12 PROCEDURE — 99214 OFFICE O/P EST MOD 30 MIN: CPT | Mod: S$PBB,,, | Performed by: INTERNAL MEDICINE

## 2018-01-12 PROCEDURE — 99213 OFFICE O/P EST LOW 20 MIN: CPT | Mod: PBBFAC,27,25 | Performed by: PHYSICIAN ASSISTANT

## 2018-01-12 PROCEDURE — 20610 DRAIN/INJ JOINT/BURSA W/O US: CPT | Mod: 50,PBBFAC | Performed by: PHYSICIAN ASSISTANT

## 2018-01-12 PROCEDURE — 99499 UNLISTED E&M SERVICE: CPT | Mod: S$PBB,,, | Performed by: PHYSICIAN ASSISTANT

## 2018-01-12 PROCEDURE — 20610 DRAIN/INJ JOINT/BURSA W/O US: CPT | Mod: 50,S$PBB,, | Performed by: PHYSICIAN ASSISTANT

## 2018-01-12 PROCEDURE — 82270 OCCULT BLOOD FECES: CPT | Mod: PBBFAC | Performed by: INTERNAL MEDICINE

## 2018-01-12 PROCEDURE — 99999 PR PBB SHADOW E&M-EST. PATIENT-LVL III: CPT | Mod: PBBFAC,,, | Performed by: INTERNAL MEDICINE

## 2018-01-12 PROCEDURE — 99999 PR PBB SHADOW E&M-EST. PATIENT-LVL III: CPT | Mod: PBBFAC,,, | Performed by: PHYSICIAN ASSISTANT

## 2018-01-12 PROCEDURE — 99213 OFFICE O/P EST LOW 20 MIN: CPT | Mod: PBBFAC | Performed by: INTERNAL MEDICINE

## 2018-01-12 RX ORDER — SILDENAFIL 100 MG/1
100 TABLET, FILM COATED ORAL DAILY PRN
Qty: 6 TABLET | Refills: 3 | Status: SHIPPED | OUTPATIENT
Start: 2018-01-12 | End: 2019-01-14

## 2018-01-12 RX ORDER — CITALOPRAM 20 MG/1
20 TABLET, FILM COATED ORAL DAILY
Qty: 30 TABLET | Refills: 12 | Status: SHIPPED | OUTPATIENT
Start: 2018-01-12 | End: 2019-01-14 | Stop reason: SDUPTHER

## 2018-01-12 RX ORDER — ATORVASTATIN CALCIUM 10 MG/1
10 TABLET, FILM COATED ORAL DAILY
Qty: 30 TABLET | Refills: 11 | Status: SHIPPED | OUTPATIENT
Start: 2018-01-12 | End: 2019-01-14 | Stop reason: SDUPTHER

## 2018-01-12 RX ORDER — OMEPRAZOLE 40 MG/1
40 CAPSULE, DELAYED RELEASE ORAL DAILY
Qty: 30 CAPSULE | Refills: 11 | Status: SHIPPED | OUTPATIENT
Start: 2018-01-12 | End: 2019-01-14 | Stop reason: SDUPTHER

## 2018-01-12 RX ADMIN — Medication 40 MG: at 09:01

## 2018-01-12 NOTE — LETTER
January 12, 2018      Luis Castillo MD  [address]  [facility] [address]           [facility] - Orthopedics  [address], [address]  [facility] [address]  Phone: [phone]          Patient: Adrian Healy   MR Number: [id number]   YOB: 1940   Date of Visit: 1/12/2018       Dear Dr. Luis Castillo:    Thank you for referring Adrian Helay to me for evaluation. Attached you will find relevant portions of my assessment and plan of care.    If you have questions, please do not hesitate to call me. I look forward to following Adrian Healy along with you.    Sincerely,    Syd Molina PA-C    Enclosure  CC:  No Recipients    If you would like to receive this communication electronically, please contact [email or web address] or [phone] to request more information on [employer] access.    For providers and/or their staff who would like to refer a patient to [facility], please contact us through our one-stop-shop provider referral line, [facility] , at [phone].    If you feel you have received this communication in error or would no longer like to receive these types of communications, please e-mail [email or web address]

## 2018-01-12 NOTE — PROGRESS NOTES
Subjective:       Patient ID: Adrian Healy is a 77 y.o. male.    Chief Complaint: Annual Exam    HPI:  77-year-old comes in for annual follow-up of medical problems including hypertension dyslipidemia reflux and anxiety and depression.  His mood has been doing very well.  He says he has been pleased with how he has been feeling.  He is taking a cruise with his wife in a few weeks and is getting his 3rd knee injection.  He says it has been working very well.  He denies any chest pain shortness of breath fevers or chills.  No cough or wheeze.    He would like to try Viagra.  He says since starting Celexa and as he has got a little older he has noted some issues with ED.  We talked about benefits risks and side effects.        Review of Systems   Constitutional: Negative for chills, fatigue, fever and unexpected weight change.   HENT: Negative for ear pain and sore throat.    Eyes: Negative for pain and visual disturbance.   Respiratory: Negative for cough, shortness of breath and wheezing.    Cardiovascular: Negative for chest pain and leg swelling.   Gastrointestinal: Negative for abdominal pain, constipation, diarrhea, nausea and vomiting.   Genitourinary: Negative for difficulty urinating, frequency and hematuria.   Musculoskeletal: Positive for arthralgias. Negative for back pain, myalgias, neck pain and neck stiffness.   Skin: Negative for rash and wound.   Neurological: Negative for dizziness, numbness and headaches.   Hematological: Negative for adenopathy.   Psychiatric/Behavioral: Negative for dysphoric mood. The patient is nervous/anxious (stable on medication).        Objective:      Physical Exam   Constitutional: He is oriented to person, place, and time. He appears well-developed and well-nourished. No distress.   HENT:   Head: Normocephalic and atraumatic.   Right Ear: External ear normal.   Left Ear: External ear normal.   Nose: Nose normal. No rhinorrhea.   Mouth/Throat: Oropharynx is clear and  moist and mucous membranes are normal. No oropharyngeal exudate.   Eyes: Conjunctivae and EOM are normal. Pupils are equal, round, and reactive to light.   Neck: Normal range of motion. Neck supple. No JVD present. No thyromegaly present.   No supraclavicular nodes palpated bilaterally.    Cardiovascular: Normal rate, regular rhythm, normal heart sounds and intact distal pulses.    No murmur heard.  Pulmonary/Chest: Effort normal and breath sounds normal. He has no wheezes. He has no rales.   Abdominal: Soft. Bowel sounds are normal. He exhibits no distension and no mass. There is no tenderness.   Genitourinary: Rectum normal. Rectal exam shows no tenderness and guaiac negative stool. Prostate is enlarged. Prostate is not tender.   Musculoskeletal: Normal range of motion. He exhibits tenderness (knees). He exhibits no edema.   Lymphadenopathy:     He has no cervical adenopathy.        Right: No supraclavicular adenopathy present.        Left: No supraclavicular adenopathy present.   Neurological: He is alert and oriented to person, place, and time. He has normal reflexes. No cranial nerve deficit.   Reflex Scores:       Bicep reflexes are 2+ on the right side and 2+ on the left side.       Patellar reflexes are 2+ on the right side and 2+ on the left side.  Skin: Skin is warm and dry. No rash noted.   Psychiatric: He has a normal mood and affect. His behavior is normal. He does not exhibit a depressed mood.       Assessment:       1. Essential hypertension    2. Hyperlipidemia, unspecified hyperlipidemia type    3. Gastroesophageal reflux disease, esophagitis presence not specified    4. Sensorineural hearing loss (SNHL) of both ears    5. Colon cancer screening    6. Adenomatous polyps    7. Screening for prostate cancer        Plan:       Adrian was seen today for annual exam.    Diagnoses and all orders for this visit:    Essential hypertension  -     PSA, Screening; Future  -     CBC auto differential;  Future  -     Comprehensive metabolic panel; Future  -     Lipid panel; Future    Hyperlipidemia, unspecified hyperlipidemia type  -     PSA, Screening; Future  -     CBC auto differential; Future  -     Comprehensive metabolic panel; Future  -     Lipid panel; Future    Gastroesophageal reflux disease, esophagitis presence not specified  -     PSA, Screening; Future  -     CBC auto differential; Future  -     Comprehensive metabolic panel; Future  -     Lipid panel; Future    Sensorineural hearing loss (SNHL) of both ears  -     PSA, Screening; Future  -     CBC auto differential; Future  -     Comprehensive metabolic panel; Future  -     Lipid panel; Future    Colon cancer screening  -     Case request GI: COLONOSCOPY  -     PSA, Screening; Future  -     CBC auto differential; Future  -     Comprehensive metabolic panel; Future  -     Lipid panel; Future    Adenomatous polyps  -     Case request GI: COLONOSCOPY  -     PSA, Screening; Future  -     CBC auto differential; Future  -     Comprehensive metabolic panel; Future  -     Lipid panel; Future    Screening for prostate cancer  -     PSA, Screening; Future    Other orders  -     atorvastatin (LIPITOR) 10 MG tablet; Take 1 tablet (10 mg total) by mouth once daily.  -     omeprazole (PRILOSEC) 40 MG capsule; Take 1 capsule (40 mg total) by mouth once daily.  -     citalopram (CELEXA) 20 MG tablet; Take 1 tablet (20 mg total) by mouth once daily.  -     sildenafil (VIAGRA) 100 MG tablet; Take 1 tablet (100 mg total) by mouth daily as needed for Erectile Dysfunction.        review studies, follow up annually

## 2018-01-12 NOTE — PROGRESS NOTES
Adrian Healy is a 77 y.o. year old his here today for his 3rd Euflexxa injection for degenerative changes of his bilateral knee . he was last seen and treated in the clinic on 1/5/2018. There has been no significant change in his medical status since his last visit. No Fever, chills, malaise, or unexplained weight change.      Allergies, Medications, past medical and surgical history were reviewed .    Examination of the knee demonstrates  No evidence of edema, erythema , echymosis strength and range of motion are unchanged from previous visit.    The injection site was identified and the skin was prepared with a betadine solution. The  bilateral knee  joint was injected with 2 ml of Euflexxa solution under sterile technique. Adrian Healy tolerated the procedure well, he was advised to rest the knee today, ice and elevation. I may take 3 -6 weeks following the last injection to get the full benefit of the medication.  I will see him back in 3-6 months. Sooner if he has any problems or concerns.           .

## 2018-01-15 ENCOUNTER — TELEPHONE (OUTPATIENT)
Dept: ENDOSCOPY | Facility: HOSPITAL | Age: 78
End: 2018-01-15

## 2018-04-06 ENCOUNTER — OFFICE VISIT (OUTPATIENT)
Dept: ORTHOPEDICS | Facility: CLINIC | Age: 78
End: 2018-04-06
Payer: MEDICARE

## 2018-04-06 VITALS
SYSTOLIC BLOOD PRESSURE: 159 MMHG | BODY MASS INDEX: 29.92 KG/M2 | WEIGHT: 233.13 LBS | DIASTOLIC BLOOD PRESSURE: 99 MMHG | HEART RATE: 87 BPM | HEIGHT: 74 IN

## 2018-04-06 DIAGNOSIS — M17.12 PRIMARY OSTEOARTHRITIS OF LEFT KNEE: Primary | ICD-10-CM

## 2018-04-06 PROCEDURE — 99213 OFFICE O/P EST LOW 20 MIN: CPT | Mod: PBBFAC | Performed by: PHYSICIAN ASSISTANT

## 2018-04-06 PROCEDURE — 20610 DRAIN/INJ JOINT/BURSA W/O US: CPT | Mod: S$PBB,LT,, | Performed by: PHYSICIAN ASSISTANT

## 2018-04-06 PROCEDURE — 99213 OFFICE O/P EST LOW 20 MIN: CPT | Mod: 25,S$PBB,, | Performed by: PHYSICIAN ASSISTANT

## 2018-04-06 PROCEDURE — 99999 PR PBB SHADOW E&M-EST. PATIENT-LVL III: CPT | Mod: PBBFAC,,, | Performed by: PHYSICIAN ASSISTANT

## 2018-04-06 PROCEDURE — 20610 DRAIN/INJ JOINT/BURSA W/O US: CPT | Mod: PBBFAC | Performed by: PHYSICIAN ASSISTANT

## 2018-04-06 RX ORDER — BETAMETHASONE SODIUM PHOSPHATE AND BETAMETHASONE ACETATE 3; 3 MG/ML; MG/ML
6 INJECTION, SUSPENSION INTRA-ARTICULAR; INTRALESIONAL; INTRAMUSCULAR; SOFT TISSUE
Status: COMPLETED | OUTPATIENT
Start: 2018-04-06 | End: 2018-04-06

## 2018-04-06 RX ADMIN — BETAMETHASONE SODIUM PHOSPHATE AND BETAMETHASONE ACETATE 6 MG: 3; 3 INJECTION, SUSPENSION INTRA-ARTICULAR; INTRALESIONAL; INTRAMUSCULAR at 01:04

## 2018-04-06 NOTE — PROGRESS NOTES
SUBJECTIVE:     Chief Complaint & History of Present Illness:  Adrian Healy is a Established patient 77 y.o. male who is seen here today with a complaint of    Chief Complaint   Patient presents with    Left Knee - Pain    .  His patient well known to me last seen treated clinic 01/12/2018 at which time we completed a course of a bilateral Visco supplementation injections he is beginning to have return of soreness in and about the left knee.  Is been no specific trauma or injury but did have a marked increase in his activities over the past week and the is having some soreness and pain.  He is going to be traveling out of the country for the next several weeks as requesting cortisone injection to alleviate some of his immediate symptoms  On a scale of 1-10, with 10 being worst pain imaginable, he rates this pain as 2 on good days and 6 on bad days.  he describes the pain as sore and achy.    Review of patient's allergies indicates:  No Known Allergies      Current Outpatient Prescriptions   Medication Sig Dispense Refill    atorvastatin (LIPITOR) 10 MG tablet Take 1 tablet (10 mg total) by mouth once daily. 30 tablet 11    cetirizine (ZYRTEC) 5 MG tablet Take 5 mg by mouth once daily.      citalopram (CELEXA) 20 MG tablet Take 1 tablet (20 mg total) by mouth once daily. 30 tablet 12    omeprazole (PRILOSEC) 40 MG capsule Take 1 capsule (40 mg total) by mouth once daily. 30 capsule 11    sildenafil (VIAGRA) 100 MG tablet Take 1 tablet (100 mg total) by mouth daily as needed for Erectile Dysfunction. 6 tablet 3     Current Facility-Administered Medications   Medication Dose Route Frequency Provider Last Rate Last Dose    EUFLEXXA 10 mg/mL(mw 2.4 -3.6 million) injection Syrg 40 mg  40 mg Intra-articular Weekly Luis Castillo MD   40 mg at 01/12/18 0959       Past Medical History:   Diagnosis Date    GERD (gastroesophageal reflux disease)     HEARING LOSS     Hyperlipidemia     Hypertension   "      Past Surgical History:   Procedure Laterality Date    EYE SURGERY         Vital Signs (Most Recent)  Vitals:    04/06/18 1158   BP: (!) 159/99   Pulse: 87           Review of Systems:  ROS:  Constitutional: no fever or chills  Eyes: no visual changes  ENT: no nasal congestion or sore throat, positive bilateral hearing loss  Respiratory: no cough or shortness of breath  Cardiovascular: no chest pain or palpitations  Gastrointestinal: no nausea or vomiting, tolerating diet, Positive for GERD  Genitourinary: no hematuria or dysuria  Integument/Breast: no rash or pruritis  Hematologic/Lymphatic: no easy bruising or lymphadenopathy  Musculoskeletal: no arthralgias or myalgias  Neurological: no seizures or tremors  Behavioral/Psych: no auditory or visual hallucinations  Endocrine: no heat or cold intolerance                OBJECTIVE:     PHYSICAL EXAM:  Height: 6' 2" (188 cm) Weight: 105.7 kg (233 lb 2.2 oz), General Appearance: Well nourished, well developed, in no acute distress.  Neurological: Mood & affect are normal.  left  Knee Exam:  Knee Range of Motion:0-120 degrees flexion   Effusion:none  Condition of skin:intact  Location of tenderness:Medial joint line   Strength:limited by pain and 5 of 5  Stability:  Lachman: stable, LCL: stable, MCL: stable, PCL: stable and posteromedial (dial): stable  Varus /Valgus stress:  normal  Adilson:   negative/negative    right  Knee Exam:  Knee Range of Motion:0-120 degrees flexion   Effusion:none  Condition of skin:intact  Location of tenderness:None   Strength:5 of 5  Stability:  stable to testing  Varus /Valgus stress:  normal  Adilson:   negative/negative      Hip Examination:  normal    RADIOGRAPHS:  X-rays from previous visit reviewed by me today demonstrate mild to moderate arthritic changes throughout both knees with medial joint space narrowing.  No marked osteophytic spurring or sclerotic changes noted    ASSESSMENT/PLAN:     Plan: We discussed with the " patient at length all the different treatment options available for  the knee including anti-inflammatories, acetaminophen, rest, ice, knee strengthening exercise, occasional cortisone injections for temporary relief, Viscosupplimentation injections, arthroscopic debridement osteotomy, and finally knee arthroplasty.   We'll proceed with the cortisone injection of the left knee     The injection site was identified and the skin was prepared with a betadine solution. The   left  knee was injected with 1 ml of Celestone and 5 ml Lidocaine under sterile technique. Adrian Healy tolerated the procedure well, he was advised to rest the knee today, ice and elevation. he did receive immediate relief of the pain in and about his knee he was told this would be short lived and is secondary to the lidocaine. he may have an increase in his discomfort tonight followed by steady improvement over the next several days. I may take 1-3 weeks following the injection to get the full benefit of the medication.  I will see him back in 4-6 months. Sooner if he has any problems or concerns.

## 2018-06-06 DIAGNOSIS — Z12.11 SPECIAL SCREENING FOR MALIGNANT NEOPLASMS, COLON: Primary | ICD-10-CM

## 2018-06-06 RX ORDER — POLYETHYLENE GLYCOL 3350, SODIUM SULFATE ANHYDROUS, SODIUM BICARBONATE, SODIUM CHLORIDE, POTASSIUM CHLORIDE 236; 22.74; 6.74; 5.86; 2.97 G/4L; G/4L; G/4L; G/4L; G/4L
4 POWDER, FOR SOLUTION ORAL ONCE
Qty: 4000 ML | Refills: 0 | Status: SHIPPED | OUTPATIENT
Start: 2018-06-06 | End: 2018-06-06

## 2018-07-19 ENCOUNTER — ANESTHESIA (OUTPATIENT)
Dept: ENDOSCOPY | Facility: HOSPITAL | Age: 78
End: 2018-07-19
Payer: MEDICARE

## 2018-07-19 ENCOUNTER — ANESTHESIA EVENT (OUTPATIENT)
Dept: ENDOSCOPY | Facility: HOSPITAL | Age: 78
End: 2018-07-19
Payer: MEDICARE

## 2018-07-19 ENCOUNTER — HOSPITAL ENCOUNTER (OUTPATIENT)
Facility: HOSPITAL | Age: 78
Discharge: HOME | End: 2018-07-19
Attending: COLON & RECTAL SURGERY | Admitting: COLON & RECTAL SURGERY
Payer: MEDICARE

## 2018-07-19 VITALS
RESPIRATION RATE: 16 BRPM | HEIGHT: 74 IN | DIASTOLIC BLOOD PRESSURE: 71 MMHG | BODY MASS INDEX: 29.52 KG/M2 | WEIGHT: 230 LBS | TEMPERATURE: 98 F | OXYGEN SATURATION: 96 % | HEART RATE: 54 BPM | SYSTOLIC BLOOD PRESSURE: 150 MMHG

## 2018-07-19 DIAGNOSIS — Z12.11 SPECIAL SCREENING FOR MALIGNANT NEOPLASMS, COLON: ICD-10-CM

## 2018-07-19 PROCEDURE — E9220 PRA ENDO ANESTHESIA: HCPCS | Mod: PT,,, | Performed by: NURSE ANESTHETIST, CERTIFIED REGISTERED

## 2018-07-19 PROCEDURE — 45385 COLONOSCOPY W/LESION REMOVAL: CPT | Performed by: COLON & RECTAL SURGERY

## 2018-07-19 PROCEDURE — 27201042 HC RETRIEVAL NET: Performed by: COLON & RECTAL SURGERY

## 2018-07-19 PROCEDURE — 25000003 PHARM REV CODE 250: Performed by: COLON & RECTAL SURGERY

## 2018-07-19 PROCEDURE — 27201089 HC SNARE, DISP (ANY): Performed by: COLON & RECTAL SURGERY

## 2018-07-19 PROCEDURE — 88305 TISSUE EXAM BY PATHOLOGIST: CPT | Mod: 59 | Performed by: PATHOLOGY

## 2018-07-19 PROCEDURE — 37000008 HC ANESTHESIA 1ST 15 MINUTES: Performed by: COLON & RECTAL SURGERY

## 2018-07-19 PROCEDURE — 88305 TISSUE EXAM BY PATHOLOGIST: CPT | Mod: 26,,, | Performed by: PATHOLOGY

## 2018-07-19 PROCEDURE — 63600175 PHARM REV CODE 636 W HCPCS: Performed by: NURSE ANESTHETIST, CERTIFIED REGISTERED

## 2018-07-19 PROCEDURE — 45385 COLONOSCOPY W/LESION REMOVAL: CPT | Mod: PT,,, | Performed by: COLON & RECTAL SURGERY

## 2018-07-19 PROCEDURE — 25000003 PHARM REV CODE 250: Performed by: NURSE ANESTHETIST, CERTIFIED REGISTERED

## 2018-07-19 PROCEDURE — 37000009 HC ANESTHESIA EA ADD 15 MINS: Performed by: COLON & RECTAL SURGERY

## 2018-07-19 RX ORDER — PROPOFOL 10 MG/ML
VIAL (ML) INTRAVENOUS
Status: DISCONTINUED | OUTPATIENT
Start: 2018-07-19 | End: 2018-07-19

## 2018-07-19 RX ORDER — DEXTROSE MONOHYDRATE AND SODIUM CHLORIDE 5; .45 G/100ML; G/100ML
INJECTION, SOLUTION INTRAVENOUS CONTINUOUS
Status: DISCONTINUED | OUTPATIENT
Start: 2018-07-19 | End: 2018-07-19

## 2018-07-19 RX ORDER — SODIUM CHLORIDE 9 MG/ML
INJECTION, SOLUTION INTRAVENOUS CONTINUOUS PRN
Status: DISCONTINUED | OUTPATIENT
Start: 2018-07-19 | End: 2018-07-19

## 2018-07-19 RX ORDER — PROPOFOL 10 MG/ML
VIAL (ML) INTRAVENOUS CONTINUOUS PRN
Status: DISCONTINUED | OUTPATIENT
Start: 2018-07-19 | End: 2018-07-19

## 2018-07-19 RX ORDER — LIDOCAINE HCL/PF 100 MG/5ML
SYRINGE (ML) INTRAVENOUS
Status: DISCONTINUED | OUTPATIENT
Start: 2018-07-19 | End: 2018-07-19

## 2018-07-19 RX ORDER — SODIUM CHLORIDE 9 MG/ML
INJECTION, SOLUTION INTRAVENOUS ONCE
Status: COMPLETED | OUTPATIENT
Start: 2018-07-19 | End: 2018-07-19

## 2018-07-19 RX ADMIN — SODIUM CHLORIDE: 0.9 INJECTION, SOLUTION INTRAVENOUS at 08:07

## 2018-07-19 RX ADMIN — SODIUM CHLORIDE: 0.9 INJECTION, SOLUTION INTRAVENOUS at 07:07

## 2018-07-19 RX ADMIN — PROPOFOL 200 MCG/KG/MIN: 10 INJECTION, EMULSION INTRAVENOUS at 08:07

## 2018-07-19 RX ADMIN — LIDOCAINE HYDROCHLORIDE 50 MG: 20 INJECTION, SOLUTION INTRAVENOUS at 08:07

## 2018-07-19 RX ADMIN — PROPOFOL 100 MG: 10 INJECTION, EMULSION INTRAVENOUS at 08:07

## 2018-07-19 NOTE — [employer] PATIENT INSTRUCTIONS
Discharge Summary/Instructions after an Endoscopic Procedure  Patient Name: Adrian Healy  Patient MRN: [id number]  Patient YOB: 1940  Thursday, July 19, 2018  Abilio Ferrara MD  RESTRICTIONS:  During your procedure today, you received medications for sedation.  These   medications may affect your judgment, balance and coordination.  Therefore,   for 24 hours, you have the following restrictions:   - DO NOT drive a car, operate machinery, make legal/financial decisions,   sign important papers or drink alcohol.    ACTIVITY:  Today: no heavy lifting, straining or running due to procedural   sedation/anesthesia.  The following day: return to full activity including work.  DIET:  Eat and drink normally unless instructed otherwise.     TREATMENT FOR COMMON SIDE EFFECTS:  - Mild abdominal pain, nausea, belching, bloating or excessive gas:  rest,   eat lightly and use a heating pad.  - Sore Throat: treat with throat lozenges and/or gargle with warm salt   water.  - Because air was used during the procedure, expelling large amounts of air   from your rectum or belching is normal.  - If a bowel prep was taken, you may not have a bowel movement for 1-3 days.    This is normal.  SYMPTOMS TO WATCH FOR AND REPORT TO YOUR PHYSICIAN:  1. Abdominal pain or bloating, other than gas cramps.  2. Chest pain.  3. Back pain.  4. Signs of infection such as: chills or fever occurring within 24 hours   after the procedure.  5. Rectal bleeding, which would show as bright red, maroon, or black stools.   (A tablespoon of blood from the rectum is not serious, especially if   hemorrhoids are present.)  6. Vomiting.  7. Weakness or dizziness.  GO DIRECTLY TO THE NEAREST EMERGENCY ROOM IF YOU HAVE ANY OF THE FOLLOWING:      Difficulty breathing              Chills and/or fever over 101 F   Persistent vomiting and/or vomiting blood   Severe abdominal pain   Severe chest pain   Black, tarry stools   Bleeding- more than one  tablespoon   Any other symptom or condition that you feel may need urgent attention  Your doctor recommends these additional instructions:  If any biopsies were taken, your doctors clinic will contact you in 1 to 2   weeks with any results.  - Repeat colonoscopy in 3 years for screening purposes.   - Resume previous diet indefinitely.   - Continue present medications.   - Discharge patient to home (ambulatory).  For questions, problems or results please call your physician - Abilio Ferrara MD at Work:  [phone].  [facility] [place], EMERGENCY ROOM PHONE NUMBER: [phone]  IF A COMPLICATION OR EMERGENCY SITUATION ARISES AND YOU ARE UNABLE TO REACH   YOUR PHYSICIAN - GO DIRECTLY TO THE EMERGENCY ROOM.  Abilio Ferrara MD  7/19/2018 9:06:30 AM  This report has been verified and signed electronically.  [employer]

## 2018-07-19 NOTE — DISCHARGE INSTRUCTIONS
Colonoscopy     A camera attached to a flexible tube with a viewing lens is used to take video pictures.     Colonoscopy is a test to view the inside of your lower digestive tract (colon and rectum). Sometimes it can show the last part of the small intestine (ileum). During the test, small pieces of tissue may be removed for testing. This is called a biopsy. Small growths, such as polyps, may also be removed.   Why is colonoscopy done?  The test is done to help look for colon cancer. And it can help find the source of abdominal pain, bleeding, and changes in bowel habits. It may be needed once a year, depending on factors such as your:  · Age  · Health history  · Family health history  · Symptoms  · Results from any prior colonoscopy  Risks and possible complications  These include:  · Bleeding               · A puncture or tear in the colon   · Risks of anesthesia  · A cancer lesion not being seen  Getting ready   To prepare for the test:  · Talk with your healthcare provider about the risks of the test (see below). Also ask your healthcare provider about alternatives to the test.  · Tell your healthcare provider about any medicines you take. Also tell him or her about any health conditions you may have.  · Make sure your rectum and colon are empty for the test. Follow the diet and bowel prep instructions exactly. If you dont, the test may need to be rescheduled.  · Plan for a friend or family member to drive you home after the test.     Colonoscopy provides an inside view of the entire colon.     You may discuss the results with your doctor right away or at a future visit.  During the test   The test is usually done in the hospital on an outpatient basis. This means you go home the same day. The procedure takes about 30 minutes. During that time:  · You are given relaxing (sedating) medicine through an IV line. You may be drowsy, or fully asleep.  · The healthcare provider will first give you a physical exam to  check for anal and rectal problems.  · Then the anus is lubricated and the scope inserted.  · If you are awake, you may have a feeling similar to needing to have a bowel movement. You may also feel pressure as air is pumped into the colon. Its OK to pass gas during the procedure.  · Biopsy, polyp removal, or other treatments may be done during the test.  After the test   You may have gas right after the test. It can help to try to pass it to help prevent later bloating. Your healthcare provider may discuss the results with you right away. Or you may need to schedule a follow-up visit to talk about the results. After the test, you can go back to your normal eating and other activities. You may be tired from the sedation and need to rest for a few hours.  Date Last Reviewed: 11/1/2016 © 2000-2017 [employer]. [address] [place] [address], [place] [address]. All rights reserved. This information is not intended as a substitute for professional medical care. Always follow your healthcare professional's instructions.

## 2018-07-19 NOTE — ANESTHESIA PREPROCEDURE EVALUATION
07/19/2018  Adrian Healy is a 78 y.o., male.    Anesthesia Evaluation    I have reviewed the Patient Summary Reports.     I have reviewed the Medications.     Review of Systems  Hematology/Oncology:  Hematology Normal   Oncology Normal     EENT/Dental:EENT/Dental Normal   Cardiovascular:  Cardiovascular Normal     Pulmonary:  Pulmonary Normal    Renal/:  Renal/ Normal     Hepatic/GI:   GERD, well controlled    Musculoskeletal:  Musculoskeletal Normal    Neurological:  Neurology Normal    Endocrine:  Endocrine Normal    Dermatological:  Skin Normal    Psych:  Psychiatric Normal           Physical Exam  General:  Well nourished, Obesity    Airway/Jaw/Neck:  Airway Findings: Mouth Opening: Normal Tongue: Normal  Mallampati: II  Improves to I with phonation.  TM Distance: Normal, at least 6 cm        Eyes/Ears/Nose:  EYES/EARS/NOSE FINDINGS: Normal   Dental:  DENTAL FINDINGS: Normal   Chest/Lungs:  Chest/Lungs Clear    Heart/Vascular:  Heart Findings: Normal Heart murmur: negative Vascular Findings: Normal    Abdomen:  Abdomen Findings: Normal    Musculoskeletal:  Musculoskeletal Findings: Normal   Skin:  Skin Findings: Normal    Mental Status:  Mental Status Findings: Normal        Anesthesia Plan  Type of Anesthesia, risks & benefits discussed:  Anesthesia Type:  general  Patient's Preference: General  Intra-op Monitoring Plan: standard ASA monitors  Intra-op Monitoring Plan Comments:   Post Op Pain Control Plan:   Post Op Pain Control Plan Comments:   Induction:   IV  Beta Blocker:  Patient is not currently on a Beta-Blocker (No further documentation required).       Informed Consent: Patient understands risks and agrees with Anesthesia plan.  Questions answered. Anesthesia consent signed with patient.  ASA Score: 2     Day of Surgery Review of History & Physical:    H&P update referred to the surgeon.          Ready For Surgery From Anesthesia Perspective.

## 2018-07-19 NOTE — TRANSFER OF CARE
"Anesthesia Transfer of Care Note    Patient: Adrian Healy    Procedure(s) Performed: Procedure(s) (LRB):  COLONOSCOPY (N/A)    Patient location: PACU    Anesthesia Type: general    Transport from OR: Transported from OR on 2-3 L/min O2 by NC with adequate spontaneous ventilation    Post pain: adequate analgesia    Post assessment: no apparent anesthetic complications    Post vital signs: stable    Level of consciousness: sedated    Nausea/Vomiting: no nausea/vomiting    Complications: none    Transfer of care protocol was followed      Last vitals:   Visit Vitals  /72   Pulse 64   Temp 36.7 °C (98.1 °F)   Resp 18   Ht 6' 2" (1.88 m)   Wt 104.3 kg (230 lb)   SpO2 (!) 94%   BMI 29.53 kg/m²     "

## 2018-07-19 NOTE — H&P
Endoscopy H&P    Procedure : Colonoscopy      asymptomatic screening exam      Past Medical History:   Diagnosis Date    GERD (gastroesophageal reflux disease)     HEARING LOSS     Hyperlipidemia     Hypertension        Family History   Problem Relation Age of Onset    Heart disease Brother         pacemaker       Social History     Social History    Marital status:      Spouse name: N/A    Number of children: N/A    Years of education: N/A     Occupational History    Not on file.     Social History Main Topics    Smoking status: Former Smoker     Packs/day: 1.00     Years: 30.00     Types: Cigarettes     Quit date: 12/12/1997    Smokeless tobacco: Former User     Quit date: 12/10/1997    Alcohol use 3.5 oz/week     7 Standard drinks or equivalent per week    Drug use: No    Sexual activity: No     Other Topics Concern    Not on file     Social History Narrative    No narrative on file       Review of Systems:  Respiratory ROS: no cough, shortness of breath, or wheezing  Cardiovascular ROS: no chest pain or dyspnea on exertion  Gastrointestinal ROS: no abdominal pain, change in bowel habits, or black or bloody stools  Musculoskeletal ROS: negative  Neurological ROS: no TIA or stroke symptoms        Physical Exam:  General: no distress  Head: normocephalic  Neck: supple, symmetrical, trachea midline  Lungs:  clear to auscultation bilaterally and normal respiratory effort  Heart: regular rate and rhythm, S1, S2 normal, no murmur, rub or gallop  Abdomen: soft, non-tender non-distented; bowel sounds normal; no masses,  no organomegaly  Extremities: no cyanosis or edema, or clubbing       Deep Sedation: Mallampati Score II (hard and soft palate, upper portion of tonsils anduvula visible)    II    Assessment and Plan:  Proceed with Colonoscopy

## 2018-07-19 NOTE — ANESTHESIA POSTPROCEDURE EVALUATION
"Anesthesia Post Evaluation    Patient: Adrian Healy    Procedure(s) Performed: Procedure(s) (LRB):  COLONOSCOPY (N/A)    Final Anesthesia Type: general  Patient location during evaluation: GI PACU  Patient participation: Yes- Able to Participate  Level of consciousness: awake and alert and oriented  Post-procedure vital signs: reviewed and stable  Pain management: adequate  Airway patency: patent  PONV status at discharge: No PONV  Anesthetic complications: no      Cardiovascular status: blood pressure returned to baseline  Respiratory status: unassisted, spontaneous ventilation and room air  Hydration status: euvolemic  Follow-up not needed.        Visit Vitals  BP (!) 150/71   Pulse (!) 54   Temp 36.7 °C (98.1 °F)   Resp 16   Ht 6' 2" (1.88 m)   Wt 104.3 kg (230 lb)   SpO2 96%   BMI 29.53 kg/m²       Pain/Yuri Score: Pain Assessment Performed: Yes (7/19/2018  7:27 AM)  Presence of Pain: denies (7/19/2018  9:12 AM)  Yuri Score: 10 (7/19/2018  9:33 AM)      "

## 2018-07-26 ENCOUNTER — TELEPHONE (OUTPATIENT)
Dept: ENDOSCOPY | Facility: HOSPITAL | Age: 78
End: 2018-07-26

## 2018-08-02 ENCOUNTER — PATIENT MESSAGE (OUTPATIENT)
Dept: SURGERY | Facility: CLINIC | Age: 78
End: 2018-08-02

## 2018-11-16 ENCOUNTER — PES CALL (OUTPATIENT)
Dept: ADMINISTRATIVE | Facility: CLINIC | Age: 78
End: 2018-11-16

## 2018-12-11 ENCOUNTER — TELEPHONE (OUTPATIENT)
Dept: INTERNAL MEDICINE | Facility: CLINIC | Age: 78
End: 2018-12-11

## 2018-12-11 NOTE — TELEPHONE ENCOUNTER
----- Message from Triston Calle sent at 12/11/2018  3:37 PM CST -----  Contact: self   Doctor appointment and lab have been scheduled.  Please link lab orders to the lab appointment.  Date of doctor appointment:  01/14  Physical or EP:  Physical  Date of lab appointment:  01/07  Comments:

## 2018-12-31 ENCOUNTER — TELEPHONE (OUTPATIENT)
Dept: DERMATOLOGY | Facility: CLINIC | Age: 78
End: 2018-12-31

## 2018-12-31 NOTE — TELEPHONE ENCOUNTER
"----- Message from Maged Najera sent at 12/28/2018  4:08 PM CST -----  Contact: Patient   Patient Requesting Sooner Appointment.     Reason for sooner appt.: "lumps" on the back of his neck   When is the first available appointment? 4/26/19  Communication Preference:[phone]  Additional Information:  "

## 2018-12-31 NOTE — TELEPHONE ENCOUNTER
I called the patient and offered him [facility] appointments that he declined. I did ask if he would like for me to look at the next week and he said not to worry about it. He thanked me for the call.

## 2019-01-07 ENCOUNTER — LAB VISIT (OUTPATIENT)
Dept: LAB | Facility: HOSPITAL | Age: 79
End: 2019-01-07
Attending: INTERNAL MEDICINE
Payer: MEDICARE

## 2019-01-07 DIAGNOSIS — Z12.5 SCREENING FOR PROSTATE CANCER: ICD-10-CM

## 2019-01-07 DIAGNOSIS — I10 ESSENTIAL HYPERTENSION: ICD-10-CM

## 2019-01-07 DIAGNOSIS — H90.3 SENSORINEURAL HEARING LOSS (SNHL) OF BOTH EARS: ICD-10-CM

## 2019-01-07 DIAGNOSIS — K21.9 GASTROESOPHAGEAL REFLUX DISEASE, ESOPHAGITIS PRESENCE NOT SPECIFIED: ICD-10-CM

## 2019-01-07 DIAGNOSIS — Z12.11 COLON CANCER SCREENING: ICD-10-CM

## 2019-01-07 DIAGNOSIS — E78.5 HYPERLIPIDEMIA, UNSPECIFIED HYPERLIPIDEMIA TYPE: ICD-10-CM

## 2019-01-07 DIAGNOSIS — D36.9 ADENOMATOUS POLYPS: ICD-10-CM

## 2019-01-07 LAB
ALBUMIN SERPL BCP-MCNC: 4 G/DL
ALP SERPL-CCNC: 58 U/L
ALT SERPL W/O P-5'-P-CCNC: 22 U/L
ANION GAP SERPL CALC-SCNC: 8 MMOL/L
AST SERPL-CCNC: 17 U/L
BASOPHILS # BLD AUTO: 0.03 K/UL
BASOPHILS NFR BLD: 0.4 %
BILIRUB SERPL-MCNC: 0.8 MG/DL
BUN SERPL-MCNC: 12 MG/DL
CALCIUM SERPL-MCNC: 9.2 MG/DL
CHLORIDE SERPL-SCNC: 105 MMOL/L
CHOLEST SERPL-MCNC: 190 MG/DL
CHOLEST/HDLC SERPL: 2.9 {RATIO}
CO2 SERPL-SCNC: 27 MMOL/L
COMPLEXED PSA SERPL-MCNC: 0.32 NG/ML
CREAT SERPL-MCNC: 1 MG/DL
DIFFERENTIAL METHOD: ABNORMAL
EOSINOPHIL # BLD AUTO: 0.2 K/UL
EOSINOPHIL NFR BLD: 2.8 %
ERYTHROCYTE [DISTWIDTH] IN BLOOD BY AUTOMATED COUNT: 13.9 %
EST. GFR  (AFRICAN AMERICAN): >60 ML/MIN/1.73 M^2
EST. GFR  (NON AFRICAN AMERICAN): >60 ML/MIN/1.73 M^2
GLUCOSE SERPL-MCNC: 101 MG/DL
HCT VFR BLD AUTO: 44.8 %
HDLC SERPL-MCNC: 66 MG/DL
HDLC SERPL: 34.7 %
HGB BLD-MCNC: 14.8 G/DL
LDLC SERPL CALC-MCNC: 108.8 MG/DL
LYMPHOCYTES # BLD AUTO: 1.9 K/UL
LYMPHOCYTES NFR BLD: 27.8 %
MCH RBC QN AUTO: 31.8 PG
MCHC RBC AUTO-ENTMCNC: 33 G/DL
MCV RBC AUTO: 96 FL
MONOCYTES # BLD AUTO: 0.5 K/UL
MONOCYTES NFR BLD: 7.1 %
NEUTROPHILS # BLD AUTO: 4.2 K/UL
NEUTROPHILS NFR BLD: 61.6 %
NONHDLC SERPL-MCNC: 124 MG/DL
PLATELET # BLD AUTO: 194 K/UL
PMV BLD AUTO: 10.9 FL
POTASSIUM SERPL-SCNC: 4.2 MMOL/L
PROT SERPL-MCNC: 7.1 G/DL
RBC # BLD AUTO: 4.66 M/UL
SODIUM SERPL-SCNC: 140 MMOL/L
TRIGL SERPL-MCNC: 76 MG/DL
WBC # BLD AUTO: 6.79 K/UL

## 2019-01-07 PROCEDURE — 80061 LIPID PANEL: CPT

## 2019-01-07 PROCEDURE — 36415 COLL VENOUS BLD VENIPUNCTURE: CPT

## 2019-01-07 PROCEDURE — 85025 COMPLETE CBC W/AUTO DIFF WBC: CPT

## 2019-01-07 PROCEDURE — 80053 COMPREHEN METABOLIC PANEL: CPT

## 2019-01-07 PROCEDURE — 84153 ASSAY OF PSA TOTAL: CPT

## 2019-01-14 ENCOUNTER — OFFICE VISIT (OUTPATIENT)
Dept: INTERNAL MEDICINE | Facility: CLINIC | Age: 79
End: 2019-01-14
Payer: MEDICARE

## 2019-01-14 VITALS
WEIGHT: 233.25 LBS | BODY MASS INDEX: 29.93 KG/M2 | HEART RATE: 103 BPM | OXYGEN SATURATION: 100 % | DIASTOLIC BLOOD PRESSURE: 82 MMHG | SYSTOLIC BLOOD PRESSURE: 134 MMHG | HEIGHT: 74 IN

## 2019-01-14 DIAGNOSIS — E78.5 HYPERLIPIDEMIA, UNSPECIFIED HYPERLIPIDEMIA TYPE: ICD-10-CM

## 2019-01-14 DIAGNOSIS — R73.9 ELEVATED BLOOD SUGAR: ICD-10-CM

## 2019-01-14 DIAGNOSIS — K21.9 GASTROESOPHAGEAL REFLUX DISEASE, ESOPHAGITIS PRESENCE NOT SPECIFIED: ICD-10-CM

## 2019-01-14 DIAGNOSIS — Z12.5 SCREENING FOR PROSTATE CANCER: ICD-10-CM

## 2019-01-14 DIAGNOSIS — I10 ESSENTIAL HYPERTENSION: Primary | ICD-10-CM

## 2019-01-14 PROCEDURE — 99999 PR PBB SHADOW E&M-EST. PATIENT-LVL III: ICD-10-PCS | Mod: PBBFAC,,, | Performed by: INTERNAL MEDICINE

## 2019-01-14 PROCEDURE — 99214 OFFICE O/P EST MOD 30 MIN: CPT | Mod: S$PBB,,, | Performed by: INTERNAL MEDICINE

## 2019-01-14 PROCEDURE — 99213 OFFICE O/P EST LOW 20 MIN: CPT | Mod: PBBFAC | Performed by: INTERNAL MEDICINE

## 2019-01-14 PROCEDURE — 99214 PR OFFICE/OUTPT VISIT, EST, LEVL IV, 30-39 MIN: ICD-10-PCS | Mod: S$PBB,,, | Performed by: INTERNAL MEDICINE

## 2019-01-14 PROCEDURE — 99999 PR PBB SHADOW E&M-EST. PATIENT-LVL III: CPT | Mod: PBBFAC,,, | Performed by: INTERNAL MEDICINE

## 2019-01-14 RX ORDER — ATORVASTATIN CALCIUM 10 MG/1
10 TABLET, FILM COATED ORAL DAILY
Qty: 90 TABLET | Refills: 11 | Status: SHIPPED | OUTPATIENT
Start: 2019-01-14 | End: 2020-02-04 | Stop reason: SDUPTHER

## 2019-01-14 RX ORDER — CITALOPRAM 20 MG/1
20 TABLET, FILM COATED ORAL DAILY
Qty: 90 TABLET | Refills: 11 | Status: SHIPPED | OUTPATIENT
Start: 2019-01-14 | End: 2020-02-04 | Stop reason: SDUPTHER

## 2019-01-14 RX ORDER — OMEPRAZOLE 40 MG/1
40 CAPSULE, DELAYED RELEASE ORAL DAILY
Qty: 90 CAPSULE | Refills: 11 | Status: SHIPPED | OUTPATIENT
Start: 2019-01-14 | End: 2020-02-04 | Stop reason: SDUPTHER

## 2019-01-14 NOTE — PROGRESS NOTES
Subjective:       Patient ID: Adrian Healy is a 78 y.o. male.    Chief Complaint: Annual Exam    Patient history of dyslipidemia reflux hearing in for annual follow-up.  He has been going to the gym for the last several weeks and has lost a few lb.  Lipids are improved a bit.  PSA is stable.  He denies any constipation.  If he misses omeprazole a few times he may get heartburn.  No change in personal or family history.      Review of Systems   Constitutional: Negative for chills, fatigue, fever and unexpected weight change.   HENT: Positive for hearing loss (He usually wears aids but does not have them today..). Negative for ear pain and sore throat.    Eyes: Negative for pain and visual disturbance.   Respiratory: Negative for cough, shortness of breath and wheezing.    Cardiovascular: Negative for chest pain and leg swelling.   Gastrointestinal: Negative for abdominal pain, constipation, diarrhea, nausea and vomiting.   Genitourinary: Negative for difficulty urinating, frequency and hematuria.   Musculoskeletal: Negative for arthralgias, back pain, myalgias, neck pain and neck stiffness.   Skin: Negative for rash and wound.   Neurological: Negative for dizziness, numbness and headaches.   Hematological: Negative for adenopathy.   Psychiatric/Behavioral: Negative for dysphoric mood. The patient is not nervous/anxious.        Objective:      Physical Exam   Constitutional: He is oriented to person, place, and time. He appears well-developed and well-nourished. No distress.   HENT:   Head: Normocephalic and atraumatic.   Right Ear: External ear normal.   Left Ear: External ear normal.   Nose: Nose normal. No rhinorrhea.   Mouth/Throat: Oropharynx is clear and moist and mucous membranes are normal. No oropharyngeal exudate.   Eyes: Conjunctivae and EOM are normal. Pupils are equal, round, and reactive to light.   Neck: Normal range of motion. Neck supple. No JVD present. No thyromegaly present.   No supraclavicular  nodes palpated bilaterally.    Cardiovascular: Normal rate, regular rhythm, normal heart sounds and intact distal pulses.   No murmur heard.  Pulmonary/Chest: Effort normal and breath sounds normal. He has no wheezes. He has no rales.   Abdominal: Soft. Bowel sounds are normal. He exhibits no distension and no mass. There is no tenderness.   Genitourinary: Rectum normal and prostate normal. Rectal exam shows no tenderness and guaiac negative stool. Prostate is not enlarged and not tender.   Genitourinary Comments: Hemoccult Card Test revealed + control.    Musculoskeletal: Normal range of motion. He exhibits no edema or tenderness.   Lymphadenopathy:     He has no cervical adenopathy.        Right: No supraclavicular adenopathy present.        Left: No supraclavicular adenopathy present.   Neurological: He is alert and oriented to person, place, and time. He has normal reflexes. No cranial nerve deficit.   Reflex Scores:       Bicep reflexes are 2+ on the right side and 2+ on the left side.       Patellar reflexes are 2+ on the right side and 2+ on the left side.  Skin: Skin is warm and dry. No rash noted.   Psychiatric: He has a normal mood and affect. His behavior is normal. He does not exhibit a depressed mood.       Assessment:       1. Essential hypertension    2. Hyperlipidemia, unspecified hyperlipidemia type    3. Gastroesophageal reflux disease, esophagitis presence not specified        Plan:       Adrian was seen today for annual exam.    Diagnoses and all orders for this visit:    Essential hypertension    Hyperlipidemia, unspecified hyperlipidemia type    Gastroesophageal reflux disease, esophagitis presence not specified    Other orders  -     omeprazole (PRILOSEC) 40 MG capsule; Take 1 capsule (40 mg total) by mouth once daily.  -     citalopram (CELEXA) 20 MG tablet; Take 1 tablet (20 mg total) by mouth once daily.  -     atorvastatin (LIPITOR) 10 MG tablet; Take 1 tablet (10 mg total) by mouth once  daily.        follow-up annually

## 2019-02-01 ENCOUNTER — OFFICE VISIT (OUTPATIENT)
Dept: INTERNAL MEDICINE | Facility: CLINIC | Age: 79
End: 2019-02-01
Payer: MEDICARE

## 2019-02-01 VITALS
DIASTOLIC BLOOD PRESSURE: 92 MMHG | OXYGEN SATURATION: 98 % | HEIGHT: 74 IN | WEIGHT: 227.06 LBS | HEART RATE: 83 BPM | SYSTOLIC BLOOD PRESSURE: 140 MMHG | BODY MASS INDEX: 29.14 KG/M2

## 2019-02-01 DIAGNOSIS — E78.5 HYPERLIPIDEMIA, UNSPECIFIED HYPERLIPIDEMIA TYPE: ICD-10-CM

## 2019-02-01 DIAGNOSIS — I10 ESSENTIAL HYPERTENSION: Primary | ICD-10-CM

## 2019-02-01 PROCEDURE — 99214 OFFICE O/P EST MOD 30 MIN: CPT | Mod: S$PBB,,, | Performed by: INTERNAL MEDICINE

## 2019-02-01 PROCEDURE — 99213 OFFICE O/P EST LOW 20 MIN: CPT | Mod: PBBFAC | Performed by: INTERNAL MEDICINE

## 2019-02-01 PROCEDURE — 99999 PR PBB SHADOW E&M-EST. PATIENT-LVL III: CPT | Mod: PBBFAC,,, | Performed by: INTERNAL MEDICINE

## 2019-02-01 PROCEDURE — 99214 PR OFFICE/OUTPT VISIT, EST, LEVL IV, 30-39 MIN: ICD-10-PCS | Mod: S$PBB,,, | Performed by: INTERNAL MEDICINE

## 2019-02-01 PROCEDURE — 99999 PR PBB SHADOW E&M-EST. PATIENT-LVL III: ICD-10-PCS | Mod: PBBFAC,,, | Performed by: INTERNAL MEDICINE

## 2019-02-01 RX ORDER — AMLODIPINE BESYLATE 2.5 MG/1
2.5 TABLET ORAL DAILY
Qty: 30 TABLET | Refills: 11 | Status: SHIPPED | OUTPATIENT
Start: 2019-02-01 | End: 2019-02-20

## 2019-02-01 NOTE — PROGRESS NOTES
Subjective:       Patient ID: Adrian Healy is a 78 y.o. male.    Chief Complaint: Blood Pressure Check    Patient concerned and blood pressure has been running a bit.  Brings in his machine gets readings 140-170 over .  He headache or chest pain. No palpitations.  He tries to watch his salt exercises regularly and been losing weight but his pressure remains elevated.  He has a family history of and stroke so we decided start him on low-dose      Review of Systems   Constitutional: Negative for chills, fatigue, fever and unexpected weight change.   HENT: Negative for nosebleeds and trouble swallowing.    Eyes: Negative for pain and visual disturbance.   Respiratory: Negative for cough, shortness of breath and wheezing.    Cardiovascular: Negative for chest pain and palpitations.        Elevated blood pressure     Gastrointestinal: Negative for abdominal pain, constipation, diarrhea, nausea and vomiting.   Genitourinary: Negative for difficulty urinating and hematuria.   Musculoskeletal: Negative for neck pain.   Skin: Negative for rash.   Neurological: Negative for dizziness and headaches.   Hematological: Does not bruise/bleed easily.   Psychiatric/Behavioral: Negative for dysphoric mood, sleep disturbance and suicidal ideas.       Objective:      Physical Exam   Constitutional: He is oriented to person, place, and time. He appears well-developed and well-nourished. No distress.   HENT:   Head: Normocephalic and atraumatic.   Mouth/Throat: No oropharyngeal exudate.   TM's clear, pharynx clear   Eyes: Conjunctivae and EOM are normal. Pupils are equal, round, and reactive to light. No scleral icterus.   Neck: Normal range of motion. Neck supple.   No supraclavicular nodes palpated   Cardiovascular: Normal rate, regular rhythm and normal heart sounds.   No murmur heard.  Pulmonary/Chest: Effort normal and breath sounds normal. He has no wheezes.   Abdominal: Soft. Bowel sounds are normal.   Musculoskeletal: He  exhibits no edema.   Neurological: He is alert and oriented to person, place, and time.   Skin: No rash noted. No erythema. No pallor.   Psychiatric: He has a normal mood and affect. His behavior is normal.       Assessment:       1. Essential hypertension    2. Hyperlipidemia, unspecified hyperlipidemia type        Plan:       Adrian was seen today for blood pressure check.    Diagnoses and all orders for this visit:    Essential hypertension    Hyperlipidemia, unspecified hyperlipidemia type    Other orders  -     amLODIPine (NORVASC) 2.5 MG tablet; Take 1 tablet (2.5 mg total) by mouth once daily.        follow-up in a few weeks.

## 2019-02-19 ENCOUNTER — PATIENT MESSAGE (OUTPATIENT)
Dept: INTERNAL MEDICINE | Facility: CLINIC | Age: 79
End: 2019-02-19

## 2019-02-20 ENCOUNTER — PATIENT MESSAGE (OUTPATIENT)
Dept: INTERNAL MEDICINE | Facility: CLINIC | Age: 79
End: 2019-02-20

## 2019-02-20 RX ORDER — AMLODIPINE BESYLATE 5 MG/1
5 TABLET ORAL DAILY
Qty: 30 TABLET | Refills: 11 | Status: SHIPPED | OUTPATIENT
Start: 2019-02-20 | End: 2020-02-03

## 2019-03-18 DIAGNOSIS — M17.0 PRIMARY OSTEOARTHRITIS OF BOTH KNEES: Primary | ICD-10-CM

## 2019-03-21 ENCOUNTER — OFFICE VISIT (OUTPATIENT)
Dept: ORTHOPEDICS | Facility: CLINIC | Age: 79
End: 2019-03-21
Payer: MEDICARE

## 2019-03-21 VITALS
WEIGHT: 227.19 LBS | DIASTOLIC BLOOD PRESSURE: 75 MMHG | HEIGHT: 73 IN | BODY MASS INDEX: 30.11 KG/M2 | HEART RATE: 63 BPM | SYSTOLIC BLOOD PRESSURE: 134 MMHG

## 2019-03-21 DIAGNOSIS — M17.0 PRIMARY OSTEOARTHRITIS OF BOTH KNEES: Primary | ICD-10-CM

## 2019-03-21 PROCEDURE — 99999 PR PBB SHADOW E&M-EST. PATIENT-LVL III: ICD-10-PCS | Mod: PBBFAC,,, | Performed by: PHYSICIAN ASSISTANT

## 2019-03-21 PROCEDURE — 99213 OFFICE O/P EST LOW 20 MIN: CPT | Mod: PBBFAC | Performed by: PHYSICIAN ASSISTANT

## 2019-03-21 PROCEDURE — 99999 PR PBB SHADOW E&M-EST. PATIENT-LVL III: CPT | Mod: PBBFAC,,, | Performed by: PHYSICIAN ASSISTANT

## 2019-03-21 PROCEDURE — 20610 PR DRAIN/INJECT LARGE JOINT/BURSA: ICD-10-PCS | Mod: 50,S$PBB,, | Performed by: PHYSICIAN ASSISTANT

## 2019-03-21 PROCEDURE — 99499 UNLISTED E&M SERVICE: CPT | Mod: S$PBB,,, | Performed by: PHYSICIAN ASSISTANT

## 2019-03-21 PROCEDURE — 99499 NO LOS: ICD-10-PCS | Mod: S$PBB,,, | Performed by: PHYSICIAN ASSISTANT

## 2019-03-21 PROCEDURE — 20610 DRAIN/INJ JOINT/BURSA W/O US: CPT | Mod: 50,S$PBB,, | Performed by: PHYSICIAN ASSISTANT

## 2019-03-21 PROCEDURE — 20610 DRAIN/INJ JOINT/BURSA W/O US: CPT | Mod: 50,PBBFAC | Performed by: PHYSICIAN ASSISTANT

## 2019-03-21 RX ADMIN — Medication 40 MG: at 01:03

## 2019-03-21 NOTE — PROGRESS NOTES
Adrian Healy is a 78 y.o. year old his here today for his 1 st Euflexxa injection for degenerative changes of his bilateral knee . he was last seen and treated in the clinic on 4/6/2018. There has been no significant change in his medical status since his last visit. No Fever, chills, malaise, or unexplained weight change.      Allergies, Medications, past medical and surgical history were reviewed .    Examination of the knee demonstrates  No evidence of edema, erythema , echymosis strength and range of motion are unchanged from previous visit.    The injection site was identified and the skin was prepared with a betadine solution. The  bilateral knee  joint was injected with 2 ml of Euflexxa solution under sterile technique. Adrian Healy tolerated the procedure well, he was advised to rest the knee today, ice and elevation. I may take 3 -6 weeks following the last injection to get the full benefit of the medication.  I will see him back in 1 week. Sooner if he has any problems or concerns.           .     ICD-10-CM ICD-9-CM   1. Primary osteoarthritis of both knees M17.0 715.16

## 2019-03-28 ENCOUNTER — OFFICE VISIT (OUTPATIENT)
Dept: ORTHOPEDICS | Facility: CLINIC | Age: 79
End: 2019-03-28
Payer: MEDICARE

## 2019-03-28 VITALS
HEART RATE: 80 BPM | SYSTOLIC BLOOD PRESSURE: 123 MMHG | DIASTOLIC BLOOD PRESSURE: 76 MMHG | BODY MASS INDEX: 29.88 KG/M2 | HEIGHT: 73 IN | WEIGHT: 225.5 LBS

## 2019-03-28 DIAGNOSIS — M17.0 PRIMARY OSTEOARTHRITIS OF BOTH KNEES: Primary | ICD-10-CM

## 2019-03-28 PROCEDURE — 20610 PR DRAIN/INJECT LARGE JOINT/BURSA: ICD-10-PCS | Mod: 50,S$PBB,, | Performed by: PHYSICIAN ASSISTANT

## 2019-03-28 PROCEDURE — 20610 DRAIN/INJ JOINT/BURSA W/O US: CPT | Mod: 50,PBBFAC | Performed by: PHYSICIAN ASSISTANT

## 2019-03-28 PROCEDURE — 99499 NO LOS: ICD-10-PCS | Mod: S$PBB,,, | Performed by: PHYSICIAN ASSISTANT

## 2019-03-28 PROCEDURE — 20610 DRAIN/INJ JOINT/BURSA W/O US: CPT | Mod: 50,S$PBB,, | Performed by: PHYSICIAN ASSISTANT

## 2019-03-28 PROCEDURE — 99999 PR PBB SHADOW E&M-EST. PATIENT-LVL III: ICD-10-PCS | Mod: PBBFAC,,, | Performed by: PHYSICIAN ASSISTANT

## 2019-03-28 PROCEDURE — 99499 UNLISTED E&M SERVICE: CPT | Mod: S$PBB,,, | Performed by: PHYSICIAN ASSISTANT

## 2019-03-28 PROCEDURE — 99999 PR PBB SHADOW E&M-EST. PATIENT-LVL III: CPT | Mod: PBBFAC,,, | Performed by: PHYSICIAN ASSISTANT

## 2019-03-28 PROCEDURE — 99213 OFFICE O/P EST LOW 20 MIN: CPT | Mod: PBBFAC | Performed by: PHYSICIAN ASSISTANT

## 2019-03-28 RX ADMIN — Medication 40 MG: at 01:03

## 2019-03-28 NOTE — LETTER
March 28, 2019      Xander Will MD  [address]  [place] [id number]           [facility]  [address]  [place] [address]  Phone: [phone]          Patient: Adrian Healy   MR Number: [id number]   YOB: 1940   Date of Visit: 3/28/2019       Dear Dr. Xander Will:    Thank you for referring Adrian Healy to me for evaluation. Attached you will find relevant portions of my assessment and plan of care.    If you have questions, please do not hesitate to call me. I look forward to following Adrian Healy along with you.    Sincerely,    Syd Molina PA-C    Enclosure  CC:  No Recipients    If you would like to receive this communication electronically, please contact [email or web address] or [phone] to request more information on [employer] Link access.    For providers and/or their staff who would like to refer a patient to [facility], please contact us through our one-stop-shop provider referral line, [facility] , at [phone].    If you feel you have received this communication in error or would no longer like to receive these types of communications, please e-mail [email or web address]

## 2019-03-28 NOTE — PROGRESS NOTES
Adrian Healy is a 78 y.o. year old his here today for his 2nd Euflexxa injection for degenerative changes of his bilateral knee . he was last seen and treated in the clinic on 3/21/2019. There has been no significant change in his medical status since his last visit. No Fever, chills, malaise, or unexplained weight change.      Allergies, Medications, past medical and surgical history were reviewed .    Examination of the knee demonstrates  No evidence of edema, erythema , echymosis strength and range of motion are unchanged from previous visit.    The injection site was identified and the skin was prepared with a betadine solution. The  bilateral knee  joint was injected with 2 ml of Euflexxa solution under sterile technique. Adrian Healy tolerated the procedure well, he was advised to rest the knee today, ice and elevation. I may take 3 -6 weeks following the last injection to get the full benefit of the medication.  I will see him back in 1 week. Sooner if he has any problems or concerns.           .     ICD-10-CM ICD-9-CM   1. Primary osteoarthritis of both knees M17.0 715.16

## 2019-04-04 ENCOUNTER — OFFICE VISIT (OUTPATIENT)
Dept: ORTHOPEDICS | Facility: CLINIC | Age: 79
End: 2019-04-04
Payer: MEDICARE

## 2019-04-04 VITALS
DIASTOLIC BLOOD PRESSURE: 77 MMHG | HEIGHT: 73 IN | WEIGHT: 226 LBS | HEART RATE: 70 BPM | BODY MASS INDEX: 29.95 KG/M2 | SYSTOLIC BLOOD PRESSURE: 128 MMHG

## 2019-04-04 DIAGNOSIS — M17.0 PRIMARY OSTEOARTHRITIS OF BOTH KNEES: Primary | ICD-10-CM

## 2019-04-04 PROCEDURE — 99499 UNLISTED E&M SERVICE: CPT | Mod: S$PBB,,, | Performed by: PHYSICIAN ASSISTANT

## 2019-04-04 PROCEDURE — 99999 PR PBB SHADOW E&M-EST. PATIENT-LVL III: CPT | Mod: PBBFAC,,, | Performed by: PHYSICIAN ASSISTANT

## 2019-04-04 PROCEDURE — 99999 PR PBB SHADOW E&M-EST. PATIENT-LVL III: ICD-10-PCS | Mod: PBBFAC,,, | Performed by: PHYSICIAN ASSISTANT

## 2019-04-04 PROCEDURE — 99499 NO LOS: ICD-10-PCS | Mod: S$PBB,,, | Performed by: PHYSICIAN ASSISTANT

## 2019-04-04 PROCEDURE — 99213 OFFICE O/P EST LOW 20 MIN: CPT | Mod: PBBFAC | Performed by: PHYSICIAN ASSISTANT

## 2019-04-04 PROCEDURE — 20610 PR DRAIN/INJECT LARGE JOINT/BURSA: ICD-10-PCS | Mod: 50,S$PBB,, | Performed by: PHYSICIAN ASSISTANT

## 2019-04-04 PROCEDURE — 20610 DRAIN/INJ JOINT/BURSA W/O US: CPT | Mod: 50,PBBFAC | Performed by: PHYSICIAN ASSISTANT

## 2019-04-04 PROCEDURE — 20610 DRAIN/INJ JOINT/BURSA W/O US: CPT | Mod: 50,S$PBB,, | Performed by: PHYSICIAN ASSISTANT

## 2019-04-04 RX ADMIN — Medication 40 MG: at 01:04

## 2019-04-04 NOTE — PROGRESS NOTES
Adrian Healy is a 78 y.o. year old his here today for his 3rd Euflexxa injection for degenerative changes of his bilateral knee . he was last seen and treated in the clinic on 3/28/2019. There has been no significant change in his medical status since his last visit. No Fever, chills, malaise, or unexplained weight change.      Allergies, Medications, past medical and surgical history were reviewed .    Examination of the knee demonstrates  No evidence of edema, erythema , echymosis strength and range of motion are unchanged from previous visit.    The injection site was identified and the skin was prepared with a betadine solution. The  bilateral knee  joint was injected with 2 ml of Euflexxa solution under sterile technique. Adrian Healy tolerated the procedure well, he was advised to rest the knee today, ice and elevation. I may take 3 -6 weeks following the last injection to get the full benefit of the medication.  I will see him back in 4-6 months. Sooner if he has any problems or concerns.           .     ICD-10-CM ICD-9-CM   1. Primary osteoarthritis of both knees M17.0 715.16

## 2019-05-22 ENCOUNTER — TELEPHONE (OUTPATIENT)
Dept: ORTHOPEDICS | Facility: CLINIC | Age: 79
End: 2019-05-22

## 2019-05-22 NOTE — TELEPHONE ENCOUNTER
----- Message from Clink sent at 5/22/2019 11:24 AM CDT -----  Contact: Self   Pt is requesting an injection appt. Please contact pt.     [phone]

## 2019-05-23 ENCOUNTER — OFFICE VISIT (OUTPATIENT)
Dept: ORTHOPEDICS | Facility: CLINIC | Age: 79
End: 2019-05-23
Payer: MEDICARE

## 2019-05-23 VITALS
DIASTOLIC BLOOD PRESSURE: 80 MMHG | HEIGHT: 73 IN | SYSTOLIC BLOOD PRESSURE: 137 MMHG | HEART RATE: 59 BPM | BODY MASS INDEX: 29.57 KG/M2 | WEIGHT: 223.13 LBS

## 2019-05-23 DIAGNOSIS — M17.12 PRIMARY OSTEOARTHRITIS OF LEFT KNEE: Primary | ICD-10-CM

## 2019-05-23 PROCEDURE — 99213 OFFICE O/P EST LOW 20 MIN: CPT | Mod: S$PBB,25,, | Performed by: PHYSICIAN ASSISTANT

## 2019-05-23 PROCEDURE — 20610 DRAIN/INJ JOINT/BURSA W/O US: CPT | Mod: S$PBB,LT,, | Performed by: PHYSICIAN ASSISTANT

## 2019-05-23 PROCEDURE — 20610 PR DRAIN/INJECT LARGE JOINT/BURSA: ICD-10-PCS | Mod: S$PBB,LT,, | Performed by: PHYSICIAN ASSISTANT

## 2019-05-23 PROCEDURE — 99999 PR PBB SHADOW E&M-EST. PATIENT-LVL III: CPT | Mod: PBBFAC,,, | Performed by: PHYSICIAN ASSISTANT

## 2019-05-23 PROCEDURE — 99213 PR OFFICE/OUTPT VISIT, EST, LEVL III, 20-29 MIN: ICD-10-PCS | Mod: S$PBB,25,, | Performed by: PHYSICIAN ASSISTANT

## 2019-05-23 PROCEDURE — 99999 PR PBB SHADOW E&M-EST. PATIENT-LVL III: ICD-10-PCS | Mod: PBBFAC,,, | Performed by: PHYSICIAN ASSISTANT

## 2019-05-23 PROCEDURE — 20610 DRAIN/INJ JOINT/BURSA W/O US: CPT | Mod: PBBFAC | Performed by: PHYSICIAN ASSISTANT

## 2019-05-23 PROCEDURE — 99213 OFFICE O/P EST LOW 20 MIN: CPT | Mod: PBBFAC,25 | Performed by: PHYSICIAN ASSISTANT

## 2019-05-23 RX ORDER — BETAMETHASONE SODIUM PHOSPHATE AND BETAMETHASONE ACETATE 3; 3 MG/ML; MG/ML
6 INJECTION, SUSPENSION INTRA-ARTICULAR; INTRALESIONAL; INTRAMUSCULAR; SOFT TISSUE
Status: COMPLETED | OUTPATIENT
Start: 2019-05-23 | End: 2019-05-23

## 2019-05-23 RX ADMIN — BETAMETHASONE SODIUM PHOSPHATE AND BETAMETHASONE ACETATE 6 MG: 3; 3 INJECTION, SUSPENSION INTRA-ARTICULAR; INTRALESIONAL; INTRAMUSCULAR at 10:05

## 2019-05-23 NOTE — PROGRESS NOTES
SUBJECTIVE:     Chief Complaint & History of Present Illness:  Adrian Healy is a Established patient 78 y.o. male who is seen here today with a complaint of  left knee pain and swelling .  He has patient well-known to me was last seen treated the clinic 04/04/2019 when he completed his last Euflexxa injections for bilateral knees.  Has done very well since his last injection but did a significant amount of dancing associated with his daughter's wedding last week and has since developed swelling pain and difficulty with weight-bearing in the left knee.  On a scale of 1-10, with 10 being worst pain imaginable, he rates this pain as 2 on good days and 6 on bad days.  he describes the pain as tender and sore.    Review of patient's allergies indicates:  No Known Allergies      Current Outpatient Medications   Medication Sig Dispense Refill    amLODIPine (NORVASC) 5 MG tablet Take 1 tablet (5 mg total) by mouth once daily. 30 tablet 11    atorvastatin (LIPITOR) 10 MG tablet Take 1 tablet (10 mg total) by mouth once daily. 90 tablet 11    cetirizine (ZYRTEC) 5 MG tablet Take 5 mg by mouth once daily.      citalopram (CELEXA) 20 MG tablet Take 1 tablet (20 mg total) by mouth once daily. 90 tablet 11    omeprazole (PRILOSEC) 40 MG capsule Take 1 capsule (40 mg total) by mouth once daily. 90 capsule 11     No current facility-administered medications for this visit.        Past Medical History:   Diagnosis Date    GERD (gastroesophageal reflux disease)     HEARING LOSS     Hyperlipidemia     Hypertension        Past Surgical History:   Procedure Laterality Date    COLONOSCOPY N/A 7/19/2018    Performed by Abilio [facility] at [facility] (4TH FLR)    COLONOSCOPY N/A 1/5/2015    Performed by Severino Jenkins MD at [facility] ENDO (4TH FLR)    EYE SURGERY         Vital Signs (Most Recent)  Vitals:    05/23/19 0938   BP: 137/80   Pulse: (!) 59           Review of Systems:  ROS:  Constitutional: no fever or  "chills  Eyes: no visual changes  ENT: no nasal congestion or sore throat, positive bilateral hearing loss  Respiratory: no cough or shortness of breath  Cardiovascular: no chest pain or palpitations  Gastrointestinal: no nausea or vomiting, tolerating diet, Positive for GERD  Genitourinary: no hematuria or dysuria  Integument/Breast: no rash or pruritis  Hematologic/Lymphatic: no easy bruising or lymphadenopathy  Musculoskeletal: no arthralgias or myalgias  Neurological: no seizures or tremors  Behavioral/Psych: no auditory or visual hallucinations  Endocrine: no heat or cold intolerance              OBJECTIVE:     PHYSICAL EXAM:  Height: 6' 1" (185.4 cm) Weight: 101.2 kg (223 lb 1.7 oz), General Appearance: Well nourished, well developed, in no acute distress.  Neurological: Mood & affect are normal.  left  Knee Exam:  Knee Range of Motion:0-110 degrees flexion   Effusion:yes  Condition of skin:intact  Location of tenderness:  Globally   Strength:limited by pain and 5 of 5  Stability:  Lachman: stable, LCL: stable, MCL: stable, PCL: stable and posteromedial (dial): stable  Varus /Valgus stress:  normal  Adilson:   negative/negative    right  Knee Exam:  Knee Range of Motion:0-120 degrees flexion   Effusion:none  Condition of skin:intact  Location of tenderness:None   Strength:5 of 5  Stability:  stable to testing  Varus /Valgus stress:  normal  Adilson:   negative/negative      Hip Examination:  full painless range of motion, without tenderness    RADIOGRAPHS:  X-rays from previous visit reviewed by me today demonstrate mild to moderate arthritic changes throughout both knees with medial joint space narrowing in a early sclerotic changes no evidence of fracture dislocation or other bony abnormalities    ASSESSMENT/PLAN:       ICD-10-CM ICD-9-CM   1. Primary osteoarthritis of left knee M17.12 715.16       Plan: We discussed with the patient at length all the different treatment options available for  the knee " including anti-inflammatories, acetaminophen, rest, ice, knee strengthening exercise, occasional cortisone injections for temporary relief, Viscosupplimentation injections, arthroscopic debridement osteotomy, and finally knee arthroplasty.   Proceed with aspiration cortisone injection left knee     The injection site was identified and the skin was prepared with a betadine solution. The   left  knee was aspirated and 40 cc of normal-appearing fluid was withdrawn the knee was then injected with 1 ml of Celestone and 5 ml Lidocaine under sterile technique. Adrian Healy tolerated the procedure well, he was advised to rest the knee today, ice and elevation. he did receive immediate relief of the pain in and about his knee he was told this would be short lived and is secondary to the lidocaine. he may have an increase in his discomfort tonight followed by steady improvement over the next several days. I may take 1-3 weeks following the injection to get the full benefit of the medication.  I will see him back in 4-6 months. Sooner if he has any problems or concerns.

## 2020-02-03 RX ORDER — AMLODIPINE BESYLATE 5 MG/1
TABLET ORAL
Qty: 90 TABLET | Refills: 3 | Status: SHIPPED | OUTPATIENT
Start: 2020-02-03 | End: 2021-01-27

## 2020-02-04 RX ORDER — OMEPRAZOLE 40 MG/1
CAPSULE, DELAYED RELEASE ORAL
Qty: 90 CAPSULE | Refills: 11 | Status: SHIPPED | OUTPATIENT
Start: 2020-02-04 | End: 2021-03-02 | Stop reason: SDUPTHER

## 2020-02-04 RX ORDER — CITALOPRAM 20 MG/1
TABLET, FILM COATED ORAL
Qty: 90 TABLET | Refills: 11 | Status: SHIPPED | OUTPATIENT
Start: 2020-02-04 | End: 2021-03-02 | Stop reason: SDUPTHER

## 2020-02-04 RX ORDER — ATORVASTATIN CALCIUM 10 MG/1
TABLET, FILM COATED ORAL
Qty: 90 TABLET | Refills: 11 | Status: SHIPPED | OUTPATIENT
Start: 2020-02-04 | End: 2021-03-02 | Stop reason: SDUPTHER

## 2020-02-06 ENCOUNTER — LAB VISIT (OUTPATIENT)
Dept: LAB | Facility: HOSPITAL | Age: 80
End: 2020-02-06
Attending: INTERNAL MEDICINE
Payer: MEDICARE

## 2020-02-06 DIAGNOSIS — R73.9 ELEVATED BLOOD SUGAR: ICD-10-CM

## 2020-02-06 DIAGNOSIS — K21.9 GASTROESOPHAGEAL REFLUX DISEASE, ESOPHAGITIS PRESENCE NOT SPECIFIED: ICD-10-CM

## 2020-02-06 DIAGNOSIS — I10 ESSENTIAL HYPERTENSION: ICD-10-CM

## 2020-02-06 DIAGNOSIS — E78.5 HYPERLIPIDEMIA, UNSPECIFIED HYPERLIPIDEMIA TYPE: ICD-10-CM

## 2020-02-06 DIAGNOSIS — Z12.5 SCREENING FOR PROSTATE CANCER: ICD-10-CM

## 2020-02-06 LAB
ALBUMIN SERPL BCP-MCNC: 3.9 G/DL (ref 3.5–5.2)
ALP SERPL-CCNC: 72 U/L (ref 55–135)
ALT SERPL W/O P-5'-P-CCNC: 15 U/L (ref 10–44)
ANION GAP SERPL CALC-SCNC: 5 MMOL/L (ref 8–16)
AST SERPL-CCNC: 18 U/L (ref 10–40)
BASOPHILS # BLD AUTO: 0.04 K/UL (ref 0–0.2)
BASOPHILS NFR BLD: 0.6 % (ref 0–1.9)
BILIRUB SERPL-MCNC: 0.7 MG/DL (ref 0.1–1)
BUN SERPL-MCNC: 13 MG/DL (ref 8–23)
CALCIUM SERPL-MCNC: 9.6 MG/DL (ref 8.7–10.5)
CHLORIDE SERPL-SCNC: 106 MMOL/L (ref 95–110)
CHOLEST SERPL-MCNC: 189 MG/DL (ref 120–199)
CHOLEST/HDLC SERPL: 3 {RATIO} (ref 2–5)
CO2 SERPL-SCNC: 29 MMOL/L (ref 23–29)
COMPLEXED PSA SERPL-MCNC: 0.28 NG/ML (ref 0–4)
CREAT SERPL-MCNC: 1.1 MG/DL (ref 0.5–1.4)
DIFFERENTIAL METHOD: ABNORMAL
EOSINOPHIL # BLD AUTO: 0.2 K/UL (ref 0–0.5)
EOSINOPHIL NFR BLD: 3.2 % (ref 0–8)
ERYTHROCYTE [DISTWIDTH] IN BLOOD BY AUTOMATED COUNT: 14 % (ref 11.5–14.5)
EST. GFR  (AFRICAN AMERICAN): >60 ML/MIN/1.73 M^2
EST. GFR  (NON AFRICAN AMERICAN): >60 ML/MIN/1.73 M^2
ESTIMATED AVG GLUCOSE: 103 MG/DL (ref 68–131)
GLUCOSE SERPL-MCNC: 100 MG/DL (ref 70–110)
HBA1C MFR BLD HPLC: 5.2 % (ref 4–5.6)
HCT VFR BLD AUTO: 46.1 % (ref 40–54)
HDLC SERPL-MCNC: 63 MG/DL (ref 40–75)
HDLC SERPL: 33.3 % (ref 20–50)
HGB BLD-MCNC: 15.2 G/DL (ref 14–18)
IMM GRANULOCYTES # BLD AUTO: 0.01 K/UL (ref 0–0.04)
IMM GRANULOCYTES NFR BLD AUTO: 0.2 % (ref 0–0.5)
LDLC SERPL CALC-MCNC: 109.2 MG/DL (ref 63–159)
LYMPHOCYTES # BLD AUTO: 1.6 K/UL (ref 1–4.8)
LYMPHOCYTES NFR BLD: 25 % (ref 18–48)
MCH RBC QN AUTO: 33 PG (ref 27–31)
MCHC RBC AUTO-ENTMCNC: 33 G/DL (ref 32–36)
MCV RBC AUTO: 100 FL (ref 82–98)
MONOCYTES # BLD AUTO: 0.5 K/UL (ref 0.3–1)
MONOCYTES NFR BLD: 7.1 % (ref 4–15)
NEUTROPHILS # BLD AUTO: 4.1 K/UL (ref 1.8–7.7)
NEUTROPHILS NFR BLD: 63.9 % (ref 38–73)
NONHDLC SERPL-MCNC: 126 MG/DL
NRBC BLD-RTO: 0 /100 WBC
PLATELET # BLD AUTO: 209 K/UL (ref 150–350)
PMV BLD AUTO: 11.4 FL (ref 9.2–12.9)
POTASSIUM SERPL-SCNC: 4.3 MMOL/L (ref 3.5–5.1)
PROT SERPL-MCNC: 7.2 G/DL (ref 6–8.4)
RBC # BLD AUTO: 4.61 M/UL (ref 4.6–6.2)
SODIUM SERPL-SCNC: 140 MMOL/L (ref 136–145)
TRIGL SERPL-MCNC: 84 MG/DL (ref 30–150)
WBC # BLD AUTO: 6.48 K/UL (ref 3.9–12.7)

## 2020-02-06 PROCEDURE — 80053 COMPREHEN METABOLIC PANEL: CPT

## 2020-02-06 PROCEDURE — 80061 LIPID PANEL: CPT

## 2020-02-06 PROCEDURE — 83036 HEMOGLOBIN GLYCOSYLATED A1C: CPT

## 2020-02-06 PROCEDURE — 36415 COLL VENOUS BLD VENIPUNCTURE: CPT

## 2020-02-06 PROCEDURE — 84153 ASSAY OF PSA TOTAL: CPT

## 2020-02-06 PROCEDURE — 85025 COMPLETE CBC W/AUTO DIFF WBC: CPT

## 2020-02-13 ENCOUNTER — PATIENT MESSAGE (OUTPATIENT)
Dept: PHARMACY | Facility: CLINIC | Age: 80
End: 2020-02-13

## 2020-02-13 ENCOUNTER — OFFICE VISIT (OUTPATIENT)
Dept: INTERNAL MEDICINE | Facility: CLINIC | Age: 80
End: 2020-02-13
Payer: MEDICARE

## 2020-02-13 ENCOUNTER — IMMUNIZATION (OUTPATIENT)
Dept: PHARMACY | Facility: CLINIC | Age: 80
End: 2020-02-13
Payer: MEDICARE

## 2020-02-13 VITALS
WEIGHT: 224.63 LBS | HEART RATE: 56 BPM | BODY MASS INDEX: 29.64 KG/M2 | SYSTOLIC BLOOD PRESSURE: 126 MMHG | DIASTOLIC BLOOD PRESSURE: 78 MMHG | OXYGEN SATURATION: 98 %

## 2020-02-13 DIAGNOSIS — I10 ESSENTIAL HYPERTENSION: ICD-10-CM

## 2020-02-13 DIAGNOSIS — K21.9 GASTROESOPHAGEAL REFLUX DISEASE, ESOPHAGITIS PRESENCE NOT SPECIFIED: ICD-10-CM

## 2020-02-13 DIAGNOSIS — Z00.00 ROUTINE PHYSICAL EXAMINATION: Primary | ICD-10-CM

## 2020-02-13 DIAGNOSIS — T78.40XA ALLERGIC STATE, INITIAL ENCOUNTER: ICD-10-CM

## 2020-02-13 DIAGNOSIS — E78.5 HYPERLIPIDEMIA, UNSPECIFIED HYPERLIPIDEMIA TYPE: ICD-10-CM

## 2020-02-13 DIAGNOSIS — F41.9 ANXIETY: ICD-10-CM

## 2020-02-13 PROCEDURE — 99999 PR PBB SHADOW E&M-EST. PATIENT-LVL III: CPT | Mod: PBBFAC,,, | Performed by: INTERNAL MEDICINE

## 2020-02-13 PROCEDURE — 99213 OFFICE O/P EST LOW 20 MIN: CPT | Mod: PBBFAC | Performed by: INTERNAL MEDICINE

## 2020-02-13 PROCEDURE — 99214 PR OFFICE/OUTPT VISIT, EST, LEVL IV, 30-39 MIN: ICD-10-PCS | Mod: S$PBB,,, | Performed by: INTERNAL MEDICINE

## 2020-02-13 PROCEDURE — 99999 PR PBB SHADOW E&M-EST. PATIENT-LVL III: ICD-10-PCS | Mod: PBBFAC,,, | Performed by: INTERNAL MEDICINE

## 2020-02-13 PROCEDURE — 99214 OFFICE O/P EST MOD 30 MIN: CPT | Mod: S$PBB,,, | Performed by: INTERNAL MEDICINE

## 2020-02-13 NOTE — PROGRESS NOTES
Problem: Knowledge Deficit  Goal: Verbalizes understanding of labor plan  Description  Assess patient/family/caregiver's baseline knowledge level and ability to understand information  Provide education via patient/family/caregiver's preferred learning method at appropriate level of understanding  1  Provide teaching at level of understanding  2  Provide teaching via preferred learning method(s)  Outcome: Progressing     Problem: Labor & Delivery  Goal: Manages discomfort  Description  Assess and monitor for signs and symptoms of discomfort  Assess patient's pain level regularly and per hospital policy  Administer medications as ordered  Support use of nonpharmacological methods to help control pain such as distraction, imagery, relaxation, and application of heat and cold  Collaborate with interdisciplinary team and patient to determine appropriate pain management plan  1  Include patient in decisions related to comfort  2  Offer non-pharmacological pain management interventions  3  Report ineffective pain management to physician  Outcome: Progressing  Goal: Patient vital signs are stable  Description  1  Assess vital signs - vaginal delivery    Outcome: Progressing Subjective:       Patient ID: Adrian Healy is a 79 y.o. male.    Chief Complaint: Annual Exam    79-year-old male here for annual follow-up of hypertension,  dyslipidemia anxiety, hearing loss and general health follow-up.  He has stable allergies. He uses hearing aids.  He continues on his same current medications and we will make sure they have refills.  He is due for colon screening next year.  He is up-to-date with vaccines but I will offer him the shingles vaccine.    Review of Systems   Constitutional: Negative for activity change, chills, fatigue, fever and unexpected weight change.   HENT: Positive for hearing loss (Stable with hearing aids). Negative for nosebleeds and trouble swallowing.    Eyes: Negative for pain, discharge and visual disturbance.   Respiratory: Negative for cough, shortness of breath and wheezing.    Cardiovascular: Negative for chest pain and palpitations.   Gastrointestinal: Negative for abdominal pain, constipation, diarrhea, nausea and vomiting.   Genitourinary: Negative for difficulty urinating and hematuria.   Musculoskeletal: Positive for arthralgias ( knees). Negative for neck pain.   Skin: Negative for rash.   Neurological: Negative for dizziness and headaches.   Hematological: Does not bruise/bleed easily.   Psychiatric/Behavioral: Negative for dysphoric mood, sleep disturbance and suicidal ideas.       Objective:      Physical Exam   Constitutional: He is oriented to person, place, and time. He appears well-developed and well-nourished. No distress.   HENT:   Head: Normocephalic and atraumatic.   Right Ear: External ear normal.   Left Ear: External ear normal.   Nose: Nose normal. No rhinorrhea.   Mouth/Throat: Oropharynx is clear and moist and mucous membranes are normal. No oropharyngeal exudate.   Eyes: Pupils are equal, round, and reactive to light. Conjunctivae and EOM are normal.   Neck: Normal range of motion. Neck supple. No JVD present. No thyromegaly present.   No  supraclavicular nodes palpated bilaterally.    Cardiovascular: Normal rate, regular rhythm, normal heart sounds and intact distal pulses.   No murmur heard.  Pulmonary/Chest: Effort normal and breath sounds normal. He has no wheezes. He has no rales.   Abdominal: Soft. Bowel sounds are normal. He exhibits no distension and no mass. There is no tenderness.   Genitourinary: Rectum normal and prostate normal. Rectal exam shows no tenderness and guaiac negative stool. Prostate is not enlarged and not tender.   Musculoskeletal: Normal range of motion. He exhibits no edema or tenderness (No knee tenderness on general range of motion).   Lymphadenopathy:     He has no cervical adenopathy.        Right: No supraclavicular adenopathy present.        Left: No supraclavicular adenopathy present.   Neurological: He is alert and oriented to person, place, and time. He has normal reflexes. No cranial nerve deficit.   Reflex Scores:       Bicep reflexes are 2+ on the right side and 2+ on the left side.       Patellar reflexes are 2+ on the right side and 2+ on the left side.  Skin: Skin is warm and dry. No rash noted.   Psychiatric: He has a normal mood and affect. His behavior is normal. He does not exhibit a depressed mood.       Assessment:       1. Routine physical examination    2. Gastroesophageal reflux disease, esophagitis presence not specified    3. Essential hypertension    4. Hyperlipidemia, unspecified hyperlipidemia type    5. Allergic state, initial encounter    6. Anxiety        Plan:       Adrian was seen today for annual exam.    Diagnoses and all orders for this visit:    Routine physical examination    Gastroesophageal reflux disease, esophagitis presence not specified    Essential hypertension    Hyperlipidemia, unspecified hyperlipidemia type    Allergic state, initial encounter    Anxiety  Comments:  Stable on Celexa

## 2020-07-15 DIAGNOSIS — Z71.89 COMPLEX CARE COORDINATION: ICD-10-CM

## 2020-08-18 ENCOUNTER — IMMUNIZATION (OUTPATIENT)
Dept: PHARMACY | Facility: CLINIC | Age: 80
End: 2020-08-18
Payer: MEDICARE

## 2020-08-18 ENCOUNTER — PATIENT MESSAGE (OUTPATIENT)
Dept: INTERNAL MEDICINE | Facility: CLINIC | Age: 80
End: 2020-08-18

## 2020-08-18 ENCOUNTER — OFFICE VISIT (OUTPATIENT)
Dept: INTERNAL MEDICINE | Facility: CLINIC | Age: 80
End: 2020-08-18
Payer: MEDICARE

## 2020-08-18 VITALS
BODY MASS INDEX: 29.55 KG/M2 | SYSTOLIC BLOOD PRESSURE: 124 MMHG | HEART RATE: 90 BPM | OXYGEN SATURATION: 98 % | DIASTOLIC BLOOD PRESSURE: 82 MMHG | WEIGHT: 224 LBS

## 2020-08-18 DIAGNOSIS — R09.89 CHEST CONGESTION: Primary | ICD-10-CM

## 2020-08-18 DIAGNOSIS — R06.2 WHEEZE: ICD-10-CM

## 2020-08-18 PROCEDURE — 99999 PR PBB SHADOW E&M-EST. PATIENT-LVL III: ICD-10-PCS | Mod: PBBFAC,,, | Performed by: INTERNAL MEDICINE

## 2020-08-18 PROCEDURE — 99213 OFFICE O/P EST LOW 20 MIN: CPT | Mod: S$PBB,,, | Performed by: INTERNAL MEDICINE

## 2020-08-18 PROCEDURE — 99213 OFFICE O/P EST LOW 20 MIN: CPT | Mod: PBBFAC | Performed by: INTERNAL MEDICINE

## 2020-08-18 PROCEDURE — 99213 PR OFFICE/OUTPT VISIT, EST, LEVL III, 20-29 MIN: ICD-10-PCS | Mod: S$PBB,,, | Performed by: INTERNAL MEDICINE

## 2020-08-18 PROCEDURE — 99999 PR PBB SHADOW E&M-EST. PATIENT-LVL III: CPT | Mod: PBBFAC,,, | Performed by: INTERNAL MEDICINE

## 2020-08-18 NOTE — PROGRESS NOTES
Subjective:       Patient ID: Adrian Healy is a 80 y.o. male.    Chief Complaint: Wheezing    HPI: Pt complains of 1 month of sxs of chest congestion primarily when lying down on his side (right or left). Feels and hears wheezing. Goes away with sitting or standing.   Has not been exercising as much lately. He is cutting grass at home but not as much exercise.   He started taking mucinex for last few nights. Seems to be helping.  No fevers chills or productive mucus.  No difficulty breathing while lying flat.  No PND or orthopnea.  He said it is just the wheeze or squeak that he hears.    Review of Systems   Constitutional: Negative for chills, fatigue, fever and unexpected weight change.   HENT: Negative for nosebleeds and trouble swallowing.    Eyes: Negative for pain and visual disturbance.   Respiratory: Positive for wheezing (See the HPI). Negative for cough and shortness of breath.    Cardiovascular: Negative for chest pain and palpitations.   Gastrointestinal: Negative for abdominal pain, constipation, diarrhea, nausea and vomiting.   Genitourinary: Negative for difficulty urinating and hematuria.   Musculoskeletal: Negative for neck pain.   Integumentary:  Negative for rash.   Neurological: Negative for dizziness and headaches.   Hematological: Does not bruise/bleed easily.   Psychiatric/Behavioral: Negative for dysphoric mood, sleep disturbance and suicidal ideas.         Objective:      Physical Exam  Constitutional:       General: He is not in acute distress.     Appearance: He is well-developed.   HENT:      Head: Normocephalic and atraumatic.      Right Ear: Tympanic membrane, ear canal and external ear normal. There is no impacted cerumen.      Left Ear: Tympanic membrane, ear canal and external ear normal. There is no impacted cerumen.      Mouth/Throat:      Pharynx: No oropharyngeal exudate or posterior oropharyngeal erythema.   Eyes:      General: No scleral icterus.     Conjunctiva/sclera:  Conjunctivae normal.      Pupils: Pupils are equal, round, and reactive to light.   Neck:      Musculoskeletal: Normal range of motion and neck supple.      Thyroid: No thyromegaly.      Comments: No supraclavicular nodes palpated  Cardiovascular:      Rate and Rhythm: Normal rate and regular rhythm.      Heart sounds: Normal heart sounds. No murmur.   Pulmonary:      Effort: Pulmonary effort is normal.      Breath sounds: No stridor. Wheezing (Only audible in the anterior upper chest when patient is lying horizontal and on his left side.  When patient sits up, listening over the same location did not elicit wheezing) present. No rhonchi.   Abdominal:      General: Bowel sounds are normal.      Palpations: Abdomen is soft. There is no mass.      Tenderness: There is no abdominal tenderness.   Musculoskeletal:         General: No tenderness.      Right lower leg: No edema.      Left lower leg: No edema.   Lymphadenopathy:      Cervical: No cervical adenopathy.   Skin:     Coloration: Skin is not pale.      Findings: No rash.   Neurological:      General: No focal deficit present.      Mental Status: He is alert and oriented to person, place, and time.   Psychiatric:         Mood and Affect: Mood normal.         Behavior: Behavior normal.         Assessment:       1. Chest congestion    2. Wheeze        Plan:       Adrian was seen today for wheezing.    Diagnoses and all orders for this visit:    Chest congestion    Wheeze        I suspect this is more of a mucus congestion rather than a hard to her underlying lung condition.  We will try conservative treatment a few more days with Mucinex, routine physical activity to increase mobilization of secretions.  Increased water intake.  Patient will update me in 2 days if not significantly improved

## 2020-08-24 RX ORDER — PREDNISONE 20 MG/1
20 TABLET ORAL DAILY
Qty: 10 TABLET | Refills: 0 | Status: SHIPPED | OUTPATIENT
Start: 2020-08-24 | End: 2023-03-06

## 2020-09-29 ENCOUNTER — PATIENT MESSAGE (OUTPATIENT)
Dept: OTHER | Facility: OTHER | Age: 80
End: 2020-09-29

## 2020-10-02 ENCOUNTER — PES CALL (OUTPATIENT)
Dept: ADMINISTRATIVE | Facility: CLINIC | Age: 80
End: 2020-10-02

## 2020-12-11 ENCOUNTER — PATIENT MESSAGE (OUTPATIENT)
Dept: OTHER | Facility: OTHER | Age: 80
End: 2020-12-11

## 2021-01-16 ENCOUNTER — IMMUNIZATION (OUTPATIENT)
Dept: OBSTETRICS AND GYNECOLOGY | Facility: CLINIC | Age: 81
End: 2021-01-16
Payer: MEDICARE

## 2021-01-16 DIAGNOSIS — Z23 NEED FOR VACCINATION: Primary | ICD-10-CM

## 2021-01-16 PROCEDURE — 91300 COVID-19, MRNA, LNP-S, PF, 30 MCG/0.3 ML DOSE VACCINE: CPT | Mod: PBBFAC | Performed by: SPECIALIST

## 2021-02-06 ENCOUNTER — IMMUNIZATION (OUTPATIENT)
Dept: OBSTETRICS AND GYNECOLOGY | Facility: CLINIC | Age: 81
End: 2021-02-06
Payer: MEDICARE

## 2021-02-06 DIAGNOSIS — Z23 NEED FOR VACCINATION: Primary | ICD-10-CM

## 2021-02-06 PROCEDURE — 0002A COVID-19, MRNA, LNP-S, PF, 30 MCG/0.3 ML DOSE VACCINE: CPT | Mod: PBBFAC | Performed by: NURSE PRACTITIONER

## 2021-02-06 PROCEDURE — 91300 COVID-19, MRNA, LNP-S, PF, 30 MCG/0.3 ML DOSE VACCINE: CPT | Mod: PBBFAC | Performed by: NURSE PRACTITIONER

## 2021-02-23 ENCOUNTER — TELEPHONE (OUTPATIENT)
Dept: INTERNAL MEDICINE | Facility: CLINIC | Age: 81
End: 2021-02-23

## 2021-02-23 DIAGNOSIS — I10 ESSENTIAL HYPERTENSION: ICD-10-CM

## 2021-02-23 DIAGNOSIS — Z12.5 SCREENING FOR PROSTATE CANCER: Primary | ICD-10-CM

## 2021-02-23 DIAGNOSIS — R73.09 ELEVATED GLUCOSE: ICD-10-CM

## 2021-02-24 ENCOUNTER — LAB VISIT (OUTPATIENT)
Dept: LAB | Facility: HOSPITAL | Age: 81
End: 2021-02-24
Attending: INTERNAL MEDICINE
Payer: MEDICARE

## 2021-02-24 DIAGNOSIS — I10 ESSENTIAL HYPERTENSION: ICD-10-CM

## 2021-02-24 DIAGNOSIS — E78.5 HYPERLIPIDEMIA, UNSPECIFIED HYPERLIPIDEMIA TYPE: ICD-10-CM

## 2021-02-24 DIAGNOSIS — R73.09 ELEVATED GLUCOSE: ICD-10-CM

## 2021-02-24 DIAGNOSIS — Z12.5 SCREENING FOR PROSTATE CANCER: ICD-10-CM

## 2021-02-24 LAB
BASOPHILS # BLD AUTO: 0.05 K/UL (ref 0–0.2)
BASOPHILS NFR BLD: 1 % (ref 0–1.9)
COMPLEXED PSA SERPL-MCNC: 0.37 NG/ML (ref 0–4)
DIFFERENTIAL METHOD: ABNORMAL
EOSINOPHIL # BLD AUTO: 0.3 K/UL (ref 0–0.5)
EOSINOPHIL NFR BLD: 4.8 % (ref 0–8)
ERYTHROCYTE [DISTWIDTH] IN BLOOD BY AUTOMATED COUNT: 14.6 % (ref 11.5–14.5)
ESTIMATED AVG GLUCOSE: 100 MG/DL (ref 68–131)
HBA1C MFR BLD: 5.1 % (ref 4–5.6)
HCT VFR BLD AUTO: 43.2 % (ref 40–54)
HGB BLD-MCNC: 14.4 G/DL (ref 14–18)
IMM GRANULOCYTES # BLD AUTO: 0.02 K/UL (ref 0–0.04)
IMM GRANULOCYTES NFR BLD AUTO: 0.4 % (ref 0–0.5)
LYMPHOCYTES # BLD AUTO: 1.3 K/UL (ref 1–4.8)
LYMPHOCYTES NFR BLD: 25 % (ref 18–48)
MCH RBC QN AUTO: 32.8 PG (ref 27–31)
MCHC RBC AUTO-ENTMCNC: 33.3 G/DL (ref 32–36)
MCV RBC AUTO: 98 FL (ref 82–98)
MONOCYTES # BLD AUTO: 0.4 K/UL (ref 0.3–1)
MONOCYTES NFR BLD: 8 % (ref 4–15)
NEUTROPHILS # BLD AUTO: 3.2 K/UL (ref 1.8–7.7)
NEUTROPHILS NFR BLD: 60.8 % (ref 38–73)
NRBC BLD-RTO: 0 /100 WBC
PLATELET # BLD AUTO: 210 K/UL (ref 150–350)
PMV BLD AUTO: 10.6 FL (ref 9.2–12.9)
RBC # BLD AUTO: 4.39 M/UL (ref 4.6–6.2)
WBC # BLD AUTO: 5.24 K/UL (ref 3.9–12.7)

## 2021-02-24 PROCEDURE — 80053 COMPREHEN METABOLIC PANEL: CPT

## 2021-02-24 PROCEDURE — 80061 LIPID PANEL: CPT

## 2021-02-24 PROCEDURE — 36415 COLL VENOUS BLD VENIPUNCTURE: CPT

## 2021-02-24 PROCEDURE — 83036 HEMOGLOBIN GLYCOSYLATED A1C: CPT

## 2021-02-24 PROCEDURE — 85025 COMPLETE CBC W/AUTO DIFF WBC: CPT

## 2021-02-24 PROCEDURE — 84153 ASSAY OF PSA TOTAL: CPT

## 2021-02-25 LAB
ALBUMIN SERPL BCP-MCNC: 4 G/DL (ref 3.5–5.2)
ALP SERPL-CCNC: 60 U/L (ref 55–135)
ALT SERPL W/O P-5'-P-CCNC: 29 U/L (ref 10–44)
ANION GAP SERPL CALC-SCNC: 7 MMOL/L (ref 8–16)
AST SERPL-CCNC: 24 U/L (ref 10–40)
BILIRUB SERPL-MCNC: 0.6 MG/DL (ref 0.1–1)
BUN SERPL-MCNC: 9 MG/DL (ref 8–23)
CALCIUM SERPL-MCNC: 9 MG/DL (ref 8.7–10.5)
CHLORIDE SERPL-SCNC: 107 MMOL/L (ref 95–110)
CHOLEST SERPL-MCNC: 197 MG/DL (ref 120–199)
CHOLEST/HDLC SERPL: 2.6 {RATIO} (ref 2–5)
CO2 SERPL-SCNC: 26 MMOL/L (ref 23–29)
CREAT SERPL-MCNC: 1 MG/DL (ref 0.5–1.4)
EST. GFR  (AFRICAN AMERICAN): >60 ML/MIN/1.73 M^2
EST. GFR  (NON AFRICAN AMERICAN): >60 ML/MIN/1.73 M^2
GLUCOSE SERPL-MCNC: 98 MG/DL (ref 70–110)
HDLC SERPL-MCNC: 76 MG/DL (ref 40–75)
HDLC SERPL: 38.6 % (ref 20–50)
LDLC SERPL CALC-MCNC: 96.2 MG/DL (ref 63–159)
NONHDLC SERPL-MCNC: 121 MG/DL
POTASSIUM SERPL-SCNC: 4.4 MMOL/L (ref 3.5–5.1)
PROT SERPL-MCNC: 6.7 G/DL (ref 6–8.4)
SODIUM SERPL-SCNC: 140 MMOL/L (ref 136–145)
TRIGL SERPL-MCNC: 124 MG/DL (ref 30–150)

## 2021-03-02 ENCOUNTER — OFFICE VISIT (OUTPATIENT)
Dept: INTERNAL MEDICINE | Facility: CLINIC | Age: 81
End: 2021-03-02
Payer: MEDICARE

## 2021-03-02 VITALS
HEIGHT: 73 IN | WEIGHT: 228.63 LBS | SYSTOLIC BLOOD PRESSURE: 128 MMHG | HEART RATE: 100 BPM | DIASTOLIC BLOOD PRESSURE: 74 MMHG | BODY MASS INDEX: 30.3 KG/M2 | OXYGEN SATURATION: 98 %

## 2021-03-02 DIAGNOSIS — Z12.5 SCREENING FOR PROSTATE CANCER: ICD-10-CM

## 2021-03-02 DIAGNOSIS — Z00.00 ROUTINE PHYSICAL EXAMINATION: ICD-10-CM

## 2021-03-02 DIAGNOSIS — E78.5 HYPERLIPIDEMIA, UNSPECIFIED HYPERLIPIDEMIA TYPE: ICD-10-CM

## 2021-03-02 DIAGNOSIS — R73.09 ELEVATED GLUCOSE: ICD-10-CM

## 2021-03-02 DIAGNOSIS — I10 ESSENTIAL HYPERTENSION: Primary | ICD-10-CM

## 2021-03-02 DIAGNOSIS — Z11.59 NEED FOR HEPATITIS C SCREENING TEST: ICD-10-CM

## 2021-03-02 PROCEDURE — 99999 PR PBB SHADOW E&M-EST. PATIENT-LVL III: ICD-10-PCS | Mod: PBBFAC,,, | Performed by: INTERNAL MEDICINE

## 2021-03-02 PROCEDURE — 99214 OFFICE O/P EST MOD 30 MIN: CPT | Mod: S$PBB,,, | Performed by: INTERNAL MEDICINE

## 2021-03-02 PROCEDURE — 99213 OFFICE O/P EST LOW 20 MIN: CPT | Mod: PBBFAC | Performed by: INTERNAL MEDICINE

## 2021-03-02 PROCEDURE — 99999 PR PBB SHADOW E&M-EST. PATIENT-LVL III: CPT | Mod: PBBFAC,,, | Performed by: INTERNAL MEDICINE

## 2021-03-02 PROCEDURE — 99214 PR OFFICE/OUTPT VISIT, EST, LEVL IV, 30-39 MIN: ICD-10-PCS | Mod: S$PBB,,, | Performed by: INTERNAL MEDICINE

## 2021-03-02 RX ORDER — AMLODIPINE BESYLATE 5 MG/1
5 TABLET ORAL DAILY
Qty: 90 TABLET | Refills: 11 | Status: SHIPPED | OUTPATIENT
Start: 2021-03-02 | End: 2022-03-03 | Stop reason: SDUPTHER

## 2021-03-02 RX ORDER — OMEPRAZOLE 40 MG/1
40 CAPSULE, DELAYED RELEASE ORAL DAILY
Qty: 90 CAPSULE | Refills: 11 | Status: SHIPPED | OUTPATIENT
Start: 2021-03-02 | End: 2022-03-03 | Stop reason: SDUPTHER

## 2021-03-02 RX ORDER — ATORVASTATIN CALCIUM 10 MG/1
10 TABLET, FILM COATED ORAL DAILY
Qty: 90 TABLET | Refills: 11 | Status: SHIPPED | OUTPATIENT
Start: 2021-03-02 | End: 2022-03-03 | Stop reason: SDUPTHER

## 2021-03-02 RX ORDER — SILDENAFIL 100 MG/1
100 TABLET, FILM COATED ORAL DAILY PRN
Qty: 10 TABLET | Refills: 11 | Status: SHIPPED | OUTPATIENT
Start: 2021-03-02 | End: 2022-03-03 | Stop reason: SDUPTHER

## 2021-03-02 RX ORDER — CITALOPRAM 20 MG/1
20 TABLET, FILM COATED ORAL DAILY
Qty: 90 TABLET | Refills: 11 | Status: SHIPPED | OUTPATIENT
Start: 2021-03-02 | End: 2022-03-03 | Stop reason: SDUPTHER

## 2021-03-18 ENCOUNTER — PATIENT MESSAGE (OUTPATIENT)
Dept: RESEARCH | Facility: HOSPITAL | Age: 81
End: 2021-03-18

## 2021-03-26 ENCOUNTER — PATIENT MESSAGE (OUTPATIENT)
Dept: RESEARCH | Facility: HOSPITAL | Age: 81
End: 2021-03-26

## 2021-04-21 ENCOUNTER — PES CALL (OUTPATIENT)
Dept: ADMINISTRATIVE | Facility: CLINIC | Age: 81
End: 2021-04-21

## 2022-02-25 ENCOUNTER — LAB VISIT (OUTPATIENT)
Dept: LAB | Facility: HOSPITAL | Age: 82
End: 2022-02-25
Attending: INTERNAL MEDICINE
Payer: MEDICARE

## 2022-02-25 DIAGNOSIS — Z12.5 SCREENING FOR PROSTATE CANCER: ICD-10-CM

## 2022-02-25 DIAGNOSIS — Z00.00 ROUTINE PHYSICAL EXAMINATION: ICD-10-CM

## 2022-02-25 DIAGNOSIS — Z11.59 NEED FOR HEPATITIS C SCREENING TEST: ICD-10-CM

## 2022-02-25 DIAGNOSIS — I10 ESSENTIAL HYPERTENSION: ICD-10-CM

## 2022-02-25 DIAGNOSIS — R73.09 ELEVATED GLUCOSE: ICD-10-CM

## 2022-02-25 LAB
ALBUMIN SERPL BCP-MCNC: 3.9 G/DL (ref 3.5–5.2)
ALP SERPL-CCNC: 62 U/L (ref 55–135)
ALT SERPL W/O P-5'-P-CCNC: 18 U/L (ref 10–44)
ANION GAP SERPL CALC-SCNC: 8 MMOL/L (ref 8–16)
AST SERPL-CCNC: 19 U/L (ref 10–40)
BASOPHILS # BLD AUTO: 0.06 K/UL (ref 0–0.2)
BASOPHILS NFR BLD: 1 % (ref 0–1.9)
BILIRUB SERPL-MCNC: 0.8 MG/DL (ref 0.1–1)
BUN SERPL-MCNC: 11 MG/DL (ref 8–23)
CALCIUM SERPL-MCNC: 9.3 MG/DL (ref 8.7–10.5)
CHLORIDE SERPL-SCNC: 105 MMOL/L (ref 95–110)
CHOLEST SERPL-MCNC: 194 MG/DL (ref 120–199)
CHOLEST/HDLC SERPL: 2.7 {RATIO} (ref 2–5)
CO2 SERPL-SCNC: 27 MMOL/L (ref 23–29)
COMPLEXED PSA SERPL-MCNC: 0.43 NG/ML (ref 0–4)
CREAT SERPL-MCNC: 0.9 MG/DL (ref 0.5–1.4)
DIFFERENTIAL METHOD: ABNORMAL
EOSINOPHIL # BLD AUTO: 0.3 K/UL (ref 0–0.5)
EOSINOPHIL NFR BLD: 4.2 % (ref 0–8)
ERYTHROCYTE [DISTWIDTH] IN BLOOD BY AUTOMATED COUNT: 15 % (ref 11.5–14.5)
EST. GFR  (AFRICAN AMERICAN): >60 ML/MIN/1.73 M^2
EST. GFR  (NON AFRICAN AMERICAN): >60 ML/MIN/1.73 M^2
ESTIMATED AVG GLUCOSE: 105 MG/DL (ref 68–131)
GLUCOSE SERPL-MCNC: 101 MG/DL (ref 70–110)
HBA1C MFR BLD: 5.3 % (ref 4–5.6)
HCT VFR BLD AUTO: 42.3 % (ref 40–54)
HDLC SERPL-MCNC: 72 MG/DL (ref 40–75)
HDLC SERPL: 37.1 % (ref 20–50)
HGB BLD-MCNC: 13.8 G/DL (ref 14–18)
IMM GRANULOCYTES # BLD AUTO: 0.02 K/UL (ref 0–0.04)
IMM GRANULOCYTES NFR BLD AUTO: 0.3 % (ref 0–0.5)
LDLC SERPL CALC-MCNC: 108 MG/DL (ref 63–159)
LYMPHOCYTES # BLD AUTO: 1.6 K/UL (ref 1–4.8)
LYMPHOCYTES NFR BLD: 26.2 % (ref 18–48)
MCH RBC QN AUTO: 30.7 PG (ref 27–31)
MCHC RBC AUTO-ENTMCNC: 32.6 G/DL (ref 32–36)
MCV RBC AUTO: 94 FL (ref 82–98)
MONOCYTES # BLD AUTO: 0.5 K/UL (ref 0.3–1)
MONOCYTES NFR BLD: 7.8 % (ref 4–15)
NEUTROPHILS # BLD AUTO: 3.7 K/UL (ref 1.8–7.7)
NEUTROPHILS NFR BLD: 60.5 % (ref 38–73)
NONHDLC SERPL-MCNC: 122 MG/DL
NRBC BLD-RTO: 0 /100 WBC
PLATELET # BLD AUTO: 218 K/UL (ref 150–450)
PMV BLD AUTO: 11.1 FL (ref 9.2–12.9)
POTASSIUM SERPL-SCNC: 3.9 MMOL/L (ref 3.5–5.1)
PROT SERPL-MCNC: 7.1 G/DL (ref 6–8.4)
RBC # BLD AUTO: 4.5 M/UL (ref 4.6–6.2)
SODIUM SERPL-SCNC: 140 MMOL/L (ref 136–145)
TRIGL SERPL-MCNC: 70 MG/DL (ref 30–150)
WBC # BLD AUTO: 6.15 K/UL (ref 3.9–12.7)

## 2022-02-25 PROCEDURE — 80053 COMPREHEN METABOLIC PANEL: CPT | Performed by: INTERNAL MEDICINE

## 2022-02-25 PROCEDURE — 86803 HEPATITIS C AB TEST: CPT | Performed by: INTERNAL MEDICINE

## 2022-02-25 PROCEDURE — 83036 HEMOGLOBIN GLYCOSYLATED A1C: CPT | Performed by: INTERNAL MEDICINE

## 2022-02-25 PROCEDURE — 36415 COLL VENOUS BLD VENIPUNCTURE: CPT | Performed by: INTERNAL MEDICINE

## 2022-02-25 PROCEDURE — 85025 COMPLETE CBC W/AUTO DIFF WBC: CPT | Performed by: INTERNAL MEDICINE

## 2022-02-25 PROCEDURE — 80061 LIPID PANEL: CPT | Performed by: INTERNAL MEDICINE

## 2022-02-25 PROCEDURE — 84153 ASSAY OF PSA TOTAL: CPT | Performed by: INTERNAL MEDICINE

## 2022-02-28 LAB — HCV AB SERPL QL IA: NEGATIVE

## 2022-03-02 ENCOUNTER — PATIENT MESSAGE (OUTPATIENT)
Dept: RESEARCH | Facility: HOSPITAL | Age: 82
End: 2022-03-02
Payer: MEDICARE

## 2022-03-02 NOTE — PROGRESS NOTES
.0Subjective:       Patient ID: Adrian Healy is a 81 y.o. male.    Chief Complaint: Annual Exam    HPI:  Patient here for annual follow-up of medical problems including hypertension dyslipidemia stable anxiety.  He is past due for colon screening and we discussed whether at age 82 to continue this however his older brother was just diagnosed with rectal cancer so he would like to get that done.  We reviewed all of his labs and they are stable including lipids PSA and chemistry.  He remains active, tries to volunteer.  We discussed considering a COVID booster.   Vision and hearing are stable.  Bowel movements and urination or stable.    Review of Systems   Constitutional: Negative for chills, fatigue and fever.   HENT: Negative for nosebleeds and trouble swallowing.    Eyes: Negative for pain and visual disturbance.   Respiratory: Negative for cough, shortness of breath and wheezing.    Cardiovascular: Negative for chest pain and palpitations.   Gastrointestinal: Negative for abdominal pain, constipation, diarrhea, nausea and vomiting.   Genitourinary: Negative for difficulty urinating and hematuria.   Musculoskeletal: Negative for arthralgias, back pain and neck pain.   Integumentary:  Negative for rash.   Neurological: Negative for dizziness and headaches.   Hematological: Does not bruise/bleed easily.   Psychiatric/Behavioral: Negative for dysphoric mood and sleep disturbance.           Past Medical History:   Diagnosis Date    GERD (gastroesophageal reflux disease)     HEARING LOSS     Hyperlipidemia     Hypertension      Past Surgical History:   Procedure Laterality Date    COLONOSCOPY N/A 7/19/2018    Procedure: COLONOSCOPY;  Surgeon: Abilio Ferrara MD;  Location: [address];  Service: Endoscopy;  Laterality: N/A;    EYE SURGERY        Patient Active Problem List   Diagnosis    Hyperlipidemia    Hypertension    GERD (gastroesophageal reflux disease)    Hearing loss, sensorineural     Special screening for malignant neoplasms, colon    Anxiety        Objective:      Physical Exam  Constitutional:       General: He is not in acute distress.     Appearance: He is well-developed.   HENT:      Head: Normocephalic and atraumatic.      Right Ear: Tympanic membrane, ear canal and external ear normal.      Left Ear: Tympanic membrane, ear canal and external ear normal.      Mouth/Throat:      Pharynx: No oropharyngeal exudate or posterior oropharyngeal erythema.   Eyes:      General: No scleral icterus.     Conjunctiva/sclera: Conjunctivae normal.      Pupils: Pupils are equal, round, and reactive to light.   Neck:      Thyroid: No thyromegaly.      Comments: No supraclavicular nodes palpated  Cardiovascular:      Rate and Rhythm: Normal rate and regular rhythm.      Pulses: Normal pulses.      Heart sounds: Normal heart sounds. No murmur heard.  Pulmonary:      Effort: Pulmonary effort is normal.      Breath sounds: Normal breath sounds. No wheezing.   Abdominal:      General: Bowel sounds are normal.      Palpations: Abdomen is soft. There is no mass.      Tenderness: There is no abdominal tenderness.   Musculoskeletal:         General: No tenderness.      Cervical back: Normal range of motion and neck supple.      Right lower leg: No edema.      Left lower leg: No edema.   Lymphadenopathy:      Cervical: No cervical adenopathy.   Skin:     Coloration: Skin is not jaundiced or pale.   Neurological:      General: No focal deficit present.      Mental Status: He is alert and oriented to person, place, and time.   Psychiatric:         Mood and Affect: Mood normal.         Behavior: Behavior normal.         Assessment:       Problem List Items Addressed This Visit        Psychiatric    Anxiety    Relevant Orders    TSH    Lipid Panel    Comprehensive Metabolic Panel    CBC Auto Differential       Cardiac/Vascular    Hyperlipidemia    Relevant Orders    TSH    Lipid Panel    Hemoglobin A1C    Comprehensive  Metabolic Panel    CBC Auto Differential    Hypertension    Relevant Orders    TSH    Lipid Panel    Hemoglobin A1C    Comprehensive Metabolic Panel    CBC Auto Differential       GI    GERD (gastroesophageal reflux disease)    Relevant Orders    TSH    Lipid Panel    Comprehensive Metabolic Panel    CBC Auto Differential      Other Visit Diagnoses     Routine physical examination    -  Primary    Relevant Orders    TSH    Lipid Panel    Hemoglobin A1C    Comprehensive Metabolic Panel    CBC Auto Differential    PSA, Screening    Colon cancer screening        Relevant Orders    Case Request Endoscopy: COLONOSCOPY (Completed)    TSH    Lipid Panel    Comprehensive Metabolic Panel    CBC Auto Differential    Screening for prostate cancer        Relevant Orders    PSA, Screening    Elevated glucose level        Relevant Orders    Hemoglobin A1C          Plan:         Adrian was seen today for annual exam.    Diagnoses and all orders for this visit:    Routine physical examination  -     TSH; Future  -     Lipid Panel; Future  -     Hemoglobin A1C; Future  -     Comprehensive Metabolic Panel; Future  -     CBC Auto Differential; Future  -     PSA, Screening; Future    Primary hypertension  -     TSH; Future  -     Lipid Panel; Future  -     Hemoglobin A1C; Future  -     Comprehensive Metabolic Panel; Future  -     CBC Auto Differential; Future    Hyperlipidemia, unspecified hyperlipidemia type  -     TSH; Future  -     Lipid Panel; Future  -     Hemoglobin A1C; Future  -     Comprehensive Metabolic Panel; Future  -     CBC Auto Differential; Future    Gastroesophageal reflux disease, unspecified whether esophagitis present  -     TSH; Future  -     Lipid Panel; Future  -     Comprehensive Metabolic Panel; Future  -     CBC Auto Differential; Future    Anxiety  -     TSH; Future  -     Lipid Panel; Future  -     Comprehensive Metabolic Panel; Future  -     CBC Auto Differential; Future    Colon cancer screening  -      Case Request Endoscopy: COLONOSCOPY  -     TSH; Future  -     Lipid Panel; Future  -     Comprehensive Metabolic Panel; Future  -     CBC Auto Differential; Future    Screening for prostate cancer  -     PSA, Screening; Future    Elevated glucose level  -     Hemoglobin A1C; Future    Other orders  -     amLODIPine (NORVASC) 5 MG tablet; Take 1 tablet (5 mg total) by mouth once daily.  -     atorvastatin (LIPITOR) 10 MG tablet; Take 1 tablet (10 mg total) by mouth once daily.  -     citalopram (CELEXA) 20 MG tablet; Take 1 tablet (20 mg total) by mouth once daily.  -     omeprazole (PRILOSEC) 40 MG capsule; Take 1 capsule (40 mg total) by mouth once daily.  -     sildenafiL (VIAGRA) 100 MG tablet; Take 1 tablet (100 mg total) by mouth daily as needed for Erectile Dysfunction.           colonoscopy.  Labs and visit 1 year

## 2022-03-03 ENCOUNTER — OFFICE VISIT (OUTPATIENT)
Dept: INTERNAL MEDICINE | Facility: CLINIC | Age: 82
End: 2022-03-03
Payer: MEDICARE

## 2022-03-03 VITALS
DIASTOLIC BLOOD PRESSURE: 78 MMHG | HEIGHT: 73 IN | WEIGHT: 220.44 LBS | SYSTOLIC BLOOD PRESSURE: 110 MMHG | OXYGEN SATURATION: 98 % | BODY MASS INDEX: 29.22 KG/M2 | HEART RATE: 68 BPM

## 2022-03-03 DIAGNOSIS — Z12.11 COLON CANCER SCREENING: ICD-10-CM

## 2022-03-03 DIAGNOSIS — Z12.5 SCREENING FOR PROSTATE CANCER: ICD-10-CM

## 2022-03-03 DIAGNOSIS — I10 PRIMARY HYPERTENSION: ICD-10-CM

## 2022-03-03 DIAGNOSIS — K21.9 GASTROESOPHAGEAL REFLUX DISEASE, UNSPECIFIED WHETHER ESOPHAGITIS PRESENT: ICD-10-CM

## 2022-03-03 DIAGNOSIS — E78.5 HYPERLIPIDEMIA, UNSPECIFIED HYPERLIPIDEMIA TYPE: ICD-10-CM

## 2022-03-03 DIAGNOSIS — R73.09 ELEVATED GLUCOSE LEVEL: ICD-10-CM

## 2022-03-03 DIAGNOSIS — Z00.00 ROUTINE PHYSICAL EXAMINATION: Primary | ICD-10-CM

## 2022-03-03 DIAGNOSIS — F41.9 ANXIETY: ICD-10-CM

## 2022-03-03 PROCEDURE — 99214 PR OFFICE/OUTPT VISIT, EST, LEVL IV, 30-39 MIN: ICD-10-PCS | Mod: S$PBB,,, | Performed by: INTERNAL MEDICINE

## 2022-03-03 PROCEDURE — 99214 OFFICE O/P EST MOD 30 MIN: CPT | Mod: S$PBB,,, | Performed by: INTERNAL MEDICINE

## 2022-03-03 PROCEDURE — 99999 PR PBB SHADOW E&M-EST. PATIENT-LVL III: ICD-10-PCS | Mod: PBBFAC,,, | Performed by: INTERNAL MEDICINE

## 2022-03-03 PROCEDURE — 99999 PR PBB SHADOW E&M-EST. PATIENT-LVL III: CPT | Mod: PBBFAC,,, | Performed by: INTERNAL MEDICINE

## 2022-03-03 PROCEDURE — 99213 OFFICE O/P EST LOW 20 MIN: CPT | Mod: PBBFAC | Performed by: INTERNAL MEDICINE

## 2022-03-03 RX ORDER — ATORVASTATIN CALCIUM 10 MG/1
10 TABLET, FILM COATED ORAL DAILY
Qty: 90 TABLET | Refills: 11 | Status: SHIPPED | OUTPATIENT
Start: 2022-03-03 | End: 2023-03-06 | Stop reason: SDUPTHER

## 2022-03-03 RX ORDER — AMLODIPINE BESYLATE 5 MG/1
5 TABLET ORAL DAILY
Qty: 90 TABLET | Refills: 11 | Status: SHIPPED | OUTPATIENT
Start: 2022-03-03 | End: 2023-03-06 | Stop reason: SDUPTHER

## 2022-03-03 RX ORDER — SILDENAFIL 100 MG/1
100 TABLET, FILM COATED ORAL DAILY PRN
Qty: 10 TABLET | Refills: 11 | Status: SHIPPED | OUTPATIENT
Start: 2022-03-03 | End: 2023-03-06

## 2022-03-03 RX ORDER — CITALOPRAM 20 MG/1
20 TABLET, FILM COATED ORAL DAILY
Qty: 90 TABLET | Refills: 11 | Status: SHIPPED | OUTPATIENT
Start: 2022-03-03 | End: 2023-03-06 | Stop reason: SDUPTHER

## 2022-03-03 RX ORDER — OMEPRAZOLE 40 MG/1
40 CAPSULE, DELAYED RELEASE ORAL DAILY
Qty: 90 CAPSULE | Refills: 11 | Status: SHIPPED | OUTPATIENT
Start: 2022-03-03 | End: 2023-03-06 | Stop reason: SDUPTHER

## 2022-03-14 ENCOUNTER — TELEPHONE (OUTPATIENT)
Dept: ENDOSCOPY | Facility: HOSPITAL | Age: 82
End: 2022-03-14
Payer: MEDICARE

## 2022-03-14 DIAGNOSIS — Z12.11 SPECIAL SCREENING FOR MALIGNANT NEOPLASMS, COLON: Primary | ICD-10-CM

## 2022-03-14 RX ORDER — SODIUM, POTASSIUM,MAG SULFATES 17.5-3.13G
SOLUTION, RECONSTITUTED, ORAL ORAL
Qty: 1 KIT | Refills: 0 | Status: ON HOLD | OUTPATIENT
Start: 2022-03-14 | End: 2022-04-27 | Stop reason: HOSPADM

## 2022-03-14 RX ORDER — SODIUM, POTASSIUM,MAG SULFATES 17.5-3.13G
1 SOLUTION, RECONSTITUTED, ORAL ORAL DAILY
Qty: 1 KIT | Refills: 0 | Status: SHIPPED | OUTPATIENT
Start: 2022-03-14 | End: 2022-03-14

## 2022-04-27 ENCOUNTER — ANESTHESIA (OUTPATIENT)
Dept: ENDOSCOPY | Facility: HOSPITAL | Age: 82
End: 2022-04-27
Payer: MEDICARE

## 2022-04-27 ENCOUNTER — ANESTHESIA EVENT (OUTPATIENT)
Dept: ENDOSCOPY | Facility: HOSPITAL | Age: 82
End: 2022-04-27
Payer: MEDICARE

## 2022-04-27 ENCOUNTER — HOSPITAL ENCOUNTER (OUTPATIENT)
Facility: HOSPITAL | Age: 82
Discharge: HOME | End: 2022-04-27
Attending: STUDENT IN AN ORGANIZED HEALTH CARE EDUCATION/TRAINING PROGRAM | Admitting: STUDENT IN AN ORGANIZED HEALTH CARE EDUCATION/TRAINING PROGRAM
Payer: MEDICARE

## 2022-04-27 VITALS
TEMPERATURE: 98 F | WEIGHT: 220 LBS | OXYGEN SATURATION: 100 % | HEART RATE: 59 BPM | SYSTOLIC BLOOD PRESSURE: 171 MMHG | RESPIRATION RATE: 14 BRPM | HEIGHT: 73 IN | DIASTOLIC BLOOD PRESSURE: 73 MMHG | BODY MASS INDEX: 29.16 KG/M2

## 2022-04-27 DIAGNOSIS — Z12.11 SCREENING FOR MALIGNANT NEOPLASM OF COLON: ICD-10-CM

## 2022-04-27 PROCEDURE — 37000008 HC ANESTHESIA 1ST 15 MINUTES: Performed by: STUDENT IN AN ORGANIZED HEALTH CARE EDUCATION/TRAINING PROGRAM

## 2022-04-27 PROCEDURE — 27201089 HC SNARE, DISP (ANY): Performed by: STUDENT IN AN ORGANIZED HEALTH CARE EDUCATION/TRAINING PROGRAM

## 2022-04-27 PROCEDURE — 45385 PR COLONOSCOPY,REMV LESN,SNARE: ICD-10-PCS | Mod: PT,,, | Performed by: STUDENT IN AN ORGANIZED HEALTH CARE EDUCATION/TRAINING PROGRAM

## 2022-04-27 PROCEDURE — 45385 COLONOSCOPY W/LESION REMOVAL: CPT | Mod: PT,,, | Performed by: STUDENT IN AN ORGANIZED HEALTH CARE EDUCATION/TRAINING PROGRAM

## 2022-04-27 PROCEDURE — 45380 COLONOSCOPY AND BIOPSY: CPT | Mod: PT,59,, | Performed by: STUDENT IN AN ORGANIZED HEALTH CARE EDUCATION/TRAINING PROGRAM

## 2022-04-27 PROCEDURE — 37000009 HC ANESTHESIA EA ADD 15 MINS: Performed by: STUDENT IN AN ORGANIZED HEALTH CARE EDUCATION/TRAINING PROGRAM

## 2022-04-27 PROCEDURE — 27201012 HC FORCEPS, HOT/COLD, DISP: Performed by: STUDENT IN AN ORGANIZED HEALTH CARE EDUCATION/TRAINING PROGRAM

## 2022-04-27 PROCEDURE — E9220 PRA ENDO ANESTHESIA: HCPCS | Mod: PT,,, | Performed by: NURSE ANESTHETIST, CERTIFIED REGISTERED

## 2022-04-27 PROCEDURE — 88305 TISSUE EXAM BY PATHOLOGIST: ICD-10-PCS | Mod: 26,,, | Performed by: PATHOLOGY

## 2022-04-27 PROCEDURE — 45380 PR COLONOSCOPY,BIOPSY: ICD-10-PCS | Mod: PT,59,, | Performed by: STUDENT IN AN ORGANIZED HEALTH CARE EDUCATION/TRAINING PROGRAM

## 2022-04-27 PROCEDURE — E9220 PRA ENDO ANESTHESIA: ICD-10-PCS | Mod: PT,,, | Performed by: NURSE ANESTHETIST, CERTIFIED REGISTERED

## 2022-04-27 PROCEDURE — 25000003 PHARM REV CODE 250: Performed by: STUDENT IN AN ORGANIZED HEALTH CARE EDUCATION/TRAINING PROGRAM

## 2022-04-27 PROCEDURE — 88305 TISSUE EXAM BY PATHOLOGIST: CPT | Performed by: PATHOLOGY

## 2022-04-27 PROCEDURE — 45380 COLONOSCOPY AND BIOPSY: CPT | Mod: PT,59 | Performed by: STUDENT IN AN ORGANIZED HEALTH CARE EDUCATION/TRAINING PROGRAM

## 2022-04-27 PROCEDURE — 88305 TISSUE EXAM BY PATHOLOGIST: CPT | Mod: 26,,, | Performed by: PATHOLOGY

## 2022-04-27 PROCEDURE — 45385 COLONOSCOPY W/LESION REMOVAL: CPT | Mod: PT | Performed by: STUDENT IN AN ORGANIZED HEALTH CARE EDUCATION/TRAINING PROGRAM

## 2022-04-27 PROCEDURE — 63600175 PHARM REV CODE 636 W HCPCS: Performed by: NURSE ANESTHETIST, CERTIFIED REGISTERED

## 2022-04-27 RX ORDER — PROPOFOL 10 MG/ML
VIAL (ML) INTRAVENOUS
Status: DISCONTINUED | OUTPATIENT
Start: 2022-04-27 | End: 2022-04-27

## 2022-04-27 RX ORDER — LIDOCAINE HCL/PF 100 MG/5ML
SYRINGE (ML) INTRAVENOUS
Status: DISCONTINUED | OUTPATIENT
Start: 2022-04-27 | End: 2022-04-27

## 2022-04-27 RX ORDER — PROPOFOL 10 MG/ML
VIAL (ML) INTRAVENOUS CONTINUOUS PRN
Status: DISCONTINUED | OUTPATIENT
Start: 2022-04-27 | End: 2022-04-27

## 2022-04-27 RX ORDER — SODIUM CHLORIDE 9 MG/ML
INJECTION, SOLUTION INTRAVENOUS CONTINUOUS
Status: DISCONTINUED | OUTPATIENT
Start: 2022-04-27 | End: 2022-04-27 | Stop reason: HOSPADM

## 2022-04-27 RX ADMIN — SODIUM CHLORIDE: 0.9 INJECTION, SOLUTION INTRAVENOUS at 08:04

## 2022-04-27 RX ADMIN — PROPOFOL 125 MCG/KG/MIN: 10 INJECTION, EMULSION INTRAVENOUS at 09:04

## 2022-04-27 RX ADMIN — Medication 100 MG: at 09:04

## 2022-04-27 RX ADMIN — Medication 80 MG: at 09:04

## 2022-04-27 NOTE — ANESTHESIA POSTPROCEDURE EVALUATION
Anesthesia Post Evaluation    Patient: Adrian Healy    Procedure(s) Performed: Procedure(s) (LRB):  COLONOSCOPY (N/A)    Final Anesthesia Type: general      Patient location during evaluation: PACU  Patient participation: Yes- Able to Participate  Level of consciousness: awake and alert  Post-procedure vital signs: reviewed and stable  Pain management: adequate  Airway patency: patent    PONV status at discharge: No PONV  Anesthetic complications: no      Cardiovascular status: blood pressure returned to baseline  Respiratory status: unassisted  Hydration status: euvolemic  Follow-up not needed.          Vitals Value Taken Time   /73 04/27/22 1016   Temp 36.4 °C (97.5 °F) 04/27/22 0945   Pulse 59 04/27/22 1016   Resp 14 04/27/22 1016   SpO2 100 % 04/27/22 1016         Event Time   Out of Recovery 10:17:25         Pain/Yuri Score: Yuri Score: 10 (4/27/2022  9:45 AM)

## 2022-04-27 NOTE — ANESTHESIA PREPROCEDURE EVALUATION
04/27/2022  Adrian Healy is a 81 y.o., male.      Pre-op Assessment    I have reviewed the Patient Summary Reports.       I have reviewed the Medications.     Review of Systems  Anesthesia Hx:  No problems with previous Anesthesia  History of prior surgery of interest to airway management or planning: Previous anesthesia: General   Social:  Former Smoker, Social Alcohol Use 30 pack years   Hematology/Oncology:  Hematology Normal   Oncology Normal     EENT/Dental:EENT/Dental Normal   Cardiovascular:   Exercise tolerance: good Hypertension    Pulmonary:  Pulmonary Normal    Renal/:  Renal/ Normal     Hepatic/GI:   GERD    Musculoskeletal:  Musculoskeletal Normal    Neurological:  Neurology Normal    Endocrine:  Endocrine Normal    Dermatological:  Skin Normal    Psych:  Psychiatric Normal           Physical Exam  General: Well nourished and Cooperative    Airway:  Mallampati: II   Mouth Opening: Normal  TM Distance: Normal  Tongue: Normal  Neck ROM: Normal ROM    Dental:  Intact        Anesthesia Plan  Type of Anesthesia, risks & benefits discussed:    Anesthesia Type: Gen Natural Airway  Intra-op Monitoring Plan: Standard ASA Monitors  Post Op Pain Control Plan: multimodal analgesia and IV/PO Opioids PRN  Induction:  IV  Airway Plan: , Post-Induction  Informed Consent: Informed consent signed with the Patient and all parties understand the risks and agree with anesthesia plan.  All questions answered.   ASA Score: 3    Ready For Surgery From Anesthesia Perspective.     .

## 2022-04-27 NOTE — [employer] PATIENT INSTRUCTIONS
Discharge Summary/Instructions after an Endoscopic Procedure  Patient Name: Adrian Healy  Patient MRN: [id number]  Patient YOB: 1940  Wednesday, April 27, 2022  Rudy Grace MD  Dear patient,  As a result of recent federal legislation (The Federal Cures Act), you may   receive lab or pathology results from your procedure in your [facility]   account before your physician is able to contact you. Your physician or   their representative will relay the results to you with their   recommendations at their soonest availability.  Thank you,  RESTRICTIONS:  During your procedure today, you received medications for sedation.  These   medications may affect your judgment, balance and coordination.  Therefore,   for 24 hours, you have the following restrictions:   - DO NOT drive a car, operate machinery, make legal/financial decisions,   sign important papers or drink alcohol.    ACTIVITY:  Today: no heavy lifting, straining or running due to procedural   sedation/anesthesia.  The following day: return to full activity including work.  DIET:  Eat and drink normally unless instructed otherwise.     TREATMENT FOR COMMON SIDE EFFECTS:  - Mild abdominal pain, nausea, belching, bloating or excessive gas:  rest,   eat lightly and use a heating pad.  - Sore Throat: treat with throat lozenges and/or gargle with warm salt   water.  - Because air was used during the procedure, expelling large amounts of air   from your rectum or belching is normal.  - If a bowel prep was taken, you may not have a bowel movement for 1-3 days.    This is normal.  SYMPTOMS TO WATCH FOR AND REPORT TO YOUR PHYSICIAN:  1. Abdominal pain or bloating, other than gas cramps.  2. Chest pain.  3. Back pain.  4. Signs of infection such as: chills or fever occurring within 24 hours   after the procedure.  5. Rectal bleeding, which would show as bright red, maroon, or black stools.   (A tablespoon of blood from the rectum is not serious, especially  if   hemorrhoids are present.)  6. Vomiting.  7. Weakness or dizziness.  GO DIRECTLY TO THE NEAREST EMERGENCY ROOM IF YOU HAVE ANY OF THE FOLLOWING:      Difficulty breathing              Chills and/or fever over 101 F   Persistent vomiting and/or vomiting blood   Severe abdominal pain   Severe chest pain   Black, tarry stools   Bleeding- more than one tablespoon   Any other symptom or condition that you feel may need urgent attention  Your doctor recommends these additional instructions:  If any biopsies were taken, your doctors clinic will contact you in 1 to 2   weeks with any results.  - Discharge patient to home.   - Resume previous diet.   - Continue present medications.   - Await pathology results.   - Repeat colonoscopy in 3 years for surveillance based on pathology results.     - Return to referring physician as previously scheduled.  For questions, problems or results please call your physician - Rudy Grace MD at Work:  [phone].  [facility] EMERGENCY ROOM PHONE NUMBER: [phone]  IF A COMPLICATION OR EMERGENCY SITUATION ARISES AND YOU ARE UNABLE TO REACH   YOUR PHYSICIAN - GO DIRECTLY TO THE EMERGENCY ROOM.  MD Rudy Oconnell MD  4/27/2022 9:45:32 AM  This report has been verified and signed electronically.  Dear patient,  As a result of recent federal legislation (The Federal Cures Act), you may   receive lab or pathology results from your procedure in your [facility]   account before your physician is able to contact you. Your physician or   their representative will relay the results to you with their   recommendations at their soonest availability.  Thank you,  [employer]ATION

## 2022-04-27 NOTE — H&P
Endoscopy H&P    Procedure : Colonoscopy    Asymptomatic surveillance exam  personal history of colon polyps and most recent endoscopic exam 7/2018    Last Scope 2018   - One 3 mm polyp in the ascending colon, removed                         with a cold snare. Resected and retrieved.                         - One polyp in the sigmoid colon, removed with a                         hot snare. Resected and retrieved.                         - One 3 mm polyp at the recto-sigmoid colon,                         removed with a cold snare. Resected and retrieved.    Prior scope 2015 with polyps x 5    FHx: Brother diagnosed with colon cancer at 86 yoa, still living     Past Medical History:   Diagnosis Date    GERD (gastroesophageal reflux disease)     HEARING LOSS     Hyperlipidemia     Hypertension              Review of Systems -ROS:  GENERAL: No fever, chills, fatigability or weight loss.  CHEST: Denies WILSON, cyanosis, wheezing, cough and sputum production.  CARDIOVASCULAR: Denies chest pain, PND, orthopnea or reduced exercise tolerance.   Musculoskeletal ROS: negative for - gait disturbance or joint pain  Neurological ROS: negative for - confusion or memory loss        Physical Exam:  General: well developed, well nourished, no distress  Head: normocephalic  Neck: supple, symmetrical, trachea midline  Lungs:  clear to auscultation bilaterally and normal respiratory effort  Heart: regular rate and rhythm, S1, S2 normal, no murmur, rub or gallop and regular rate and rhythm  Abdomen: soft, non-tender non-distented; bowel sounds normal; no masses,  no organomegaly  Extremities: no cyanosis or edema, or clubbing       Moderate Sedation (choice): Mallampati Score 1    ASA : II    IMP: personal history of colon polyps, + FHX    Plan: Colonoscopy with Moderate sedation.  I have explained the procedure including indications, alternatives, expected outcomes and potential complications. The patient appears to understand and  gives informed consent. The patient is medically ready for surgery.

## 2022-04-27 NOTE — DISCHARGE SUMMARY
[facility] 4[facility]  Discharge Note  Short Stay    Procedure(s) (LRB):  COLONOSCOPY (N/A)    OUTCOME: Patient tolerated treatment/procedure well without complication and is now ready for discharge.    DISPOSITION: Home or Self Care    FINAL DIAGNOSIS:  Special screening for malignant neoplasms, colon    FOLLOWUP: In clinic    DISCHARGE INSTRUCTIONS:    Discharge Procedure Orders   Notify your health care provider if you experience any of the following:  temperature >100.4     Notify your health care provider if you experience any of the following:  persistent nausea and vomiting or diarrhea     Notify your health care provider if you experience any of the following:  severe uncontrolled pain     Activity as tolerated         Clinical Reference Documents Added to Patient Instructions       Document    COLONOSCOPY (ENGLISH)

## 2022-04-27 NOTE — TRANSFER OF CARE
"Anesthesia Transfer of Care Note    Patient: Adrian Healy    Procedure(s) Performed: Procedure(s) (LRB):  COLONOSCOPY (N/A)    Patient location: GI    Anesthesia Type: general    Transport from OR: Transported from OR on room air with adequate spontaneous ventilation    Post pain: adequate analgesia    Post assessment: no apparent anesthetic complications    Post vital signs: stable    Level of consciousness: awake    Nausea/Vomiting: no nausea/vomiting    Complications: none          Last vitals:   Visit Vitals  BP (!) 151/79 (BP Location: Left arm, Patient Position: Lying)   Pulse 78   Temp 36.8 °C (98.2 °F) (Temporal)   Resp 16   Ht 6' 1" (1.854 m)   Wt 99.8 kg (220 lb)   SpO2 99%   BMI 29.03 kg/m²     "

## 2022-05-04 LAB
FINAL PATHOLOGIC DIAGNOSIS: NORMAL
Lab: NORMAL

## 2022-06-03 ENCOUNTER — TELEPHONE (OUTPATIENT)
Dept: INTERNAL MEDICINE | Facility: CLINIC | Age: 82
End: 2022-06-03
Payer: MEDICARE

## 2022-06-03 RX ORDER — BENZONATATE 100 MG/1
100 CAPSULE ORAL 3 TIMES DAILY PRN
Qty: 20 CAPSULE | Refills: 0 | Status: SHIPPED | OUTPATIENT
Start: 2022-06-03 | End: 2022-06-13

## 2022-06-03 NOTE — TELEPHONE ENCOUNTER
Spoke with patient, reports he tested positive with covid  Monday after doing a home test, reports having chills, sore throat, cough, sneezing, feeling crappy  and fever, reports fever today as 99.5, earlier in week fever has been over 100. Reports only taking Tylenol for relief, requesting prescription cough medication, plan of care from PCP

## 2022-06-03 NOTE — TELEPHONE ENCOUNTER
----- Message from Tamera Suh sent at 6/3/2022  2:05 PM CDT -----  Contact: [phone]  Pts wife states she called this morning in regards to the pt testing positive for covid and no one has got back to them yet please advise and give return call

## 2022-06-03 NOTE — TELEPHONE ENCOUNTER
Cough meds sent. Fever can be intermittent with COVID. Okay to continue tylenol as needed.  Stay well hydrated, drinking plenty of fluids.

## 2022-06-03 NOTE — TELEPHONE ENCOUNTER
----- Message from Derrick Lan sent at 6/3/2022 10:24 AM CDT -----  Contact: [phone]  Pt tested pos for covid on Monday pt said he feels better but still is running fever. Pt would like a call back.

## 2022-06-13 ENCOUNTER — TELEPHONE (OUTPATIENT)
Dept: INTERNAL MEDICINE | Facility: CLINIC | Age: 82
End: 2022-06-13
Payer: MEDICARE

## 2022-06-13 NOTE — TELEPHONE ENCOUNTER
----- Message from Kassi Phillips sent at 6/13/2022  9:04 AM CDT -----  Contact: Pts Mobile [phone] Pts wife Mrs. Healy called please call home number if pt does nto answer Mobile.  Caller is requesting an earlier appointment then we can schedule.  Caller is requesting a message be sent to the provider.  If this is for urgent care symptoms, did you offer other providers at this location, providers at other locations, or [facility]? (yes, no, n/a):  N/A  If this is for the patients physical, did you offer to schedule next available and put on wait list, or to see NP or PA for their physical?  (yes, no, n/a):  N/A  When is the next available appointment with their provider:  07/01/2022  Reason for the appointment:  Patient tested positive for Covid on 05/31/2022, patient have a bad cough, and chest congestion.   Patient preference of timeframe to be scheduled:  As soon as possible.   Would the patient like a call back, or a response through their [facility] portal?:  Call

## 2022-07-22 ENCOUNTER — PES CALL (OUTPATIENT)
Dept: ADMINISTRATIVE | Facility: CLINIC | Age: 82
End: 2022-07-22
Payer: MEDICARE

## 2022-10-24 ENCOUNTER — IMMUNIZATION (OUTPATIENT)
Dept: INTERNAL MEDICINE | Facility: CLINIC | Age: 82
End: 2022-10-24
Payer: MEDICARE

## 2022-10-24 DIAGNOSIS — Z23 NEED FOR VACCINATION: Primary | ICD-10-CM

## 2022-10-24 PROCEDURE — G0008 ADMIN INFLUENZA VIRUS VAC: HCPCS | Mod: PBBFAC

## 2022-10-24 PROCEDURE — 91312 COVID-19, MRNA, LNP-S, BIVALENT BOOSTER, PF, 30 MCG/0.3 ML DOSE: CPT | Mod: PBBFAC

## 2022-10-24 PROCEDURE — 0124A COVID-19, MRNA, LNP-S, BIVALENT BOOSTER, PF, 30 MCG/0.3 ML DOSE: CPT | Mod: PBBFAC,CV19

## 2022-12-07 ENCOUNTER — PES CALL (OUTPATIENT)
Dept: ADMINISTRATIVE | Facility: CLINIC | Age: 82
End: 2022-12-07
Payer: COMMERCIAL

## 2023-01-17 ENCOUNTER — TELEPHONE (OUTPATIENT)
Dept: INTERNAL MEDICINE | Facility: CLINIC | Age: 83
End: 2023-01-17
Payer: COMMERCIAL

## 2023-01-17 NOTE — TELEPHONE ENCOUNTER
"----- Message from Nette Peacock sent at 1/17/2023 12:46 PM CST -----  Contact: [phone]  type: Lab    Caller is requesting to schedule their Lab appointment prior to annual appointment.  Order is not listed in EPIC.  Please enter order and contact patient to schedule.    Name of Caller: Monet Bridges Date and Time of Labs: 02/27/23 - 7 am    Date of Annual Physical Appointment: 03/06/23    Where would they like the lab performed? [facility]    Would the patient rather a call back or a response via [facility]? My chart    Best Call Back Number:[phone]    Additional Information:    "Do not send messages requesting lab orders prior to Physical appt on these providers: Sukhjinder Syed, Aleksandra Ba,  Sasha Mckinney and Luis Alfredo."        "

## 2023-01-19 ENCOUNTER — TELEPHONE (OUTPATIENT)
Dept: INTERNAL MEDICINE | Facility: CLINIC | Age: 83
End: 2023-01-19
Payer: COMMERCIAL

## 2023-01-19 NOTE — TELEPHONE ENCOUNTER
----- Message from Derrick Lan sent at 1/19/2023  1:59 PM CST -----  Contact: [phone]  Pt said he needs a call back about questions he has.

## 2023-02-03 ENCOUNTER — OFFICE VISIT (OUTPATIENT)
Dept: INTERNAL MEDICINE | Facility: CLINIC | Age: 83
End: 2023-02-03
Payer: MEDICARE

## 2023-02-03 VITALS
HEIGHT: 73 IN | HEART RATE: 60 BPM | WEIGHT: 223.75 LBS | RESPIRATION RATE: 16 BRPM | BODY MASS INDEX: 29.65 KG/M2 | SYSTOLIC BLOOD PRESSURE: 132 MMHG | DIASTOLIC BLOOD PRESSURE: 78 MMHG | OXYGEN SATURATION: 95 %

## 2023-02-03 DIAGNOSIS — K13.70 MOUTH LESION: ICD-10-CM

## 2023-02-03 DIAGNOSIS — I10 PRIMARY HYPERTENSION: ICD-10-CM

## 2023-02-03 DIAGNOSIS — R68.84 JAW PAIN: Primary | ICD-10-CM

## 2023-02-03 DIAGNOSIS — R29.898 POPPING OF BOTH TEMPOROMANDIBULAR JOINTS ON OPENING OF JAW: ICD-10-CM

## 2023-02-03 PROCEDURE — 99999 PR PBB SHADOW E&M-EST. PATIENT-LVL III: CPT | Mod: PBBFAC,,, | Performed by: INTERNAL MEDICINE

## 2023-02-03 PROCEDURE — 99214 PR OFFICE/OUTPT VISIT, EST, LEVL IV, 30-39 MIN: ICD-10-PCS | Mod: S$PBB,,, | Performed by: INTERNAL MEDICINE

## 2023-02-03 PROCEDURE — 99214 OFFICE O/P EST MOD 30 MIN: CPT | Mod: S$PBB,,, | Performed by: INTERNAL MEDICINE

## 2023-02-03 PROCEDURE — 99213 OFFICE O/P EST LOW 20 MIN: CPT | Mod: PBBFAC | Performed by: INTERNAL MEDICINE

## 2023-02-03 PROCEDURE — 99999 PR PBB SHADOW E&M-EST. PATIENT-LVL III: ICD-10-PCS | Mod: PBBFAC,,, | Performed by: INTERNAL MEDICINE

## 2023-02-03 NOTE — PROGRESS NOTES
Subjective:       Patient ID: Adrian Healy is a 82 y.o. male.    Chief Complaint: Jaw Pain    Patient has 2 issues to discuss.  First he is noted in the last several weeks that the left side of his jaw seems to pop.  If he eats something harder opens his mouth wide such as to yawn or to eat a large by food it pops.  He can feel and hear it.  Also recalls the dentist about 3 months ago mention he saw discoloration in the mouth and wanted him to see an oral surgeon but at the time the patient looked, said he did not see anything and decided not to go because he did not have dental insurance or oral surgery insurance.  But now with the jaw popping he is concerned and wants to be evaluated.  He denies any problems with swallowing or chewing other than the jaw.  He has a physical with me in a few weeks with lab.  Patient is a remote smoker    Review of Systems   HENT:  Positive for dental problem and mouth sores (discoloratio nnoted by the dentist).         Left-sided jaw joint popping, sore         Past Medical History:   Diagnosis Date    GERD (gastroesophageal reflux disease)     HEARING LOSS     Hyperlipidemia     Hypertension      Past Surgical History:   Procedure Laterality Date    COLONOSCOPY N/A 7/19/2018    Procedure: COLONOSCOPY;  Surgeon: Abilio Ferrara MD;  Location: [address]);  Service: Endoscopy;  Laterality: N/A;    COLONOSCOPY N/A 4/27/2022    Procedure: COLONOSCOPY;  Surgeon: Rudy Grace MD;  Location: [address]);  Service: Endoscopy;  Laterality: N/A;  fully vaccinated    EYE SURGERY        Patient Active Problem List   Diagnosis    Hyperlipidemia    Hypertension    GERD (gastroesophageal reflux disease)    Hearing loss, sensorineural    Special screening for malignant neoplasms, colon    Anxiety        Objective:      Physical Exam  HENT:      Head:      Comments: Popping and clicking both palpable and audible on the left TMJ     Right Ear: Tympanic membrane and ear  "canal normal.      Left Ear: Tympanic membrane and ear canal normal.      Mouth/Throat:      Pharynx: No oropharyngeal exudate or posterior oropharyngeal erythema.      Comments: I do not obviously see any lesions or discolorations on the mouth gums or tongue.  Cardiovascular:      Rate and Rhythm: Normal rate and regular rhythm.   Musculoskeletal:      Cervical back: No rigidity or tenderness.   Lymphadenopathy:      Cervical: No cervical adenopathy.       Assessment:       Problem List Items Addressed This Visit          Cardiac/Vascular    Hypertension     Other Visit Diagnoses       Jaw pain    -  Primary    Relevant Orders    Ambulatory referral/consult to ENT    Popping of both temporomandibular joints on opening of jaw        Relevant Orders    Ambulatory referral/consult to ENT    Mouth lesion        Relevant Orders    Ambulatory referral/consult to ENT            Plan:         Adrian was seen today for jaw pain.    Diagnoses and all orders for this visit:    Jaw pain  -     Ambulatory referral/consult to ENT; Future    Popping of both temporomandibular joints on opening of jaw  -     Ambulatory referral/consult to ENT; Future    Mouth lesion  -     Ambulatory referral/consult to ENT; Future    Primary hypertension           Will ask for an ENT evaluation.  Keep appointment with me for next month          Portions of this note may have been created with voice recognition software. Occasional "wrong-word" or "sound-a-like" substitutions may have occurred due to the inherent limitations of voice recognition software. Please, read the note carefully and recognize, using context, where substitutions have occurred.  "

## 2023-02-08 ENCOUNTER — PATIENT MESSAGE (OUTPATIENT)
Dept: INTERNAL MEDICINE | Facility: CLINIC | Age: 83
End: 2023-02-08
Payer: COMMERCIAL

## 2023-02-14 ENCOUNTER — OFFICE VISIT (OUTPATIENT)
Dept: OTOLARYNGOLOGY | Facility: CLINIC | Age: 83
End: 2023-02-14
Payer: MEDICARE

## 2023-02-14 VITALS
WEIGHT: 223.56 LBS | BODY MASS INDEX: 29.49 KG/M2 | HEART RATE: 85 BPM | DIASTOLIC BLOOD PRESSURE: 69 MMHG | SYSTOLIC BLOOD PRESSURE: 109 MMHG

## 2023-02-14 DIAGNOSIS — R68.84 JAW PAIN: ICD-10-CM

## 2023-02-14 DIAGNOSIS — R29.898 POPPING OF BOTH TEMPOROMANDIBULAR JOINTS ON OPENING OF JAW: ICD-10-CM

## 2023-02-14 DIAGNOSIS — K13.70 MOUTH LESION: ICD-10-CM

## 2023-02-14 PROCEDURE — 99203 OFFICE O/P NEW LOW 30 MIN: CPT | Mod: S$PBB,,, | Performed by: OTOLARYNGOLOGY

## 2023-02-14 PROCEDURE — 99999 PR PBB SHADOW E&M-EST. PATIENT-LVL III: CPT | Mod: PBBFAC,,, | Performed by: OTOLARYNGOLOGY

## 2023-02-14 PROCEDURE — 99999 PR PBB SHADOW E&M-EST. PATIENT-LVL III: ICD-10-PCS | Mod: PBBFAC,,, | Performed by: OTOLARYNGOLOGY

## 2023-02-14 PROCEDURE — 99203 PR OFFICE/OUTPT VISIT, NEW, LEVL III, 30-44 MIN: ICD-10-PCS | Mod: S$PBB,,, | Performed by: OTOLARYNGOLOGY

## 2023-02-14 PROCEDURE — 99213 OFFICE O/P EST LOW 20 MIN: CPT | Mod: PBBFAC | Performed by: OTOLARYNGOLOGY

## 2023-02-15 PROBLEM — R68.84 JAW PAIN: Status: ACTIVE | Noted: 2023-02-15

## 2023-02-15 PROBLEM — R29.898: Status: ACTIVE | Noted: 2023-02-15

## 2023-02-15 NOTE — ASSESSMENT & PLAN NOTE
No worrisome lesions head neck exam.  He is primarily concerned about the possibility of head neck cancer.  There is no evidence to suggest there is a head neck cancer present.  He declined referral to Oral surgery for evaluation of his TMJ clicking.  Return p.r.n.

## 2023-02-15 NOTE — PROGRESS NOTES
Chief Complaint   Patient presents with    popping of both temporomandibular       HPI   82 y.o. male presents for evaluation of a clicking TMJ on the left.  He reports this is a recent development.  He was seen by an oral surgeon several months ago before this clicking arose.  There was concern for a lesion in the mouth at that time.  He has no other complaints.  Does have a family history of cancer in wishes to rule out a head neck cancer.    Review of Systems   Constitutional: Negative for fatigue and unexpected weight change.   HENT: Per HPI.  Eyes: Negative for visual disturbance.   Respiratory: Negative for shortness of breath, hemoptysis   Cardiovascular: Negative for chest pain and palpitations.   Musculoskeletal: Negative for decreased ROM, back pain.   Skin: Negative for rash, sunburn, itching.   Neurological: Negative for dizziness and seizures.   Hematological: Negative for adenopathy. Does not bruise/bleed easily.   Endocrine: Negative for rapid weight loss/weight gain, heat/cold intolerance.     Past Medical History   Patient Active Problem List   Diagnosis    Hyperlipidemia    Hypertension    GERD (gastroesophageal reflux disease)    Hearing loss, sensorineural    Special screening for malignant neoplasms, colon    Anxiety    Jaw pain    Popping of both temporomandibular joints on opening of jaw           Past Surgical History   Past Surgical History:   Procedure Laterality Date    COLONOSCOPY N/A 7/19/2018    Procedure: COLONOSCOPY;  Surgeon: Abilio Ferrara MD;  Location: [address]);  Service: Endoscopy;  Laterality: N/A;    COLONOSCOPY N/A 4/27/2022    Procedure: COLONOSCOPY;  Surgeon: Rudy Grace MD;  Location: [address]);  Service: Endoscopy;  Laterality: N/A;  fully vaccinated    EYE SURGERY           Family History   Family History   Problem Relation Age of Onset    Liver cancer Sister     Melanoma Sister     Cancer Brother     Heart disease Brother         pacemaker     Colon cancer Brother     Prostate cancer Brother            Social History   .  Social History     Socioeconomic History    Marital status:    Tobacco Use    Smoking status: Former     Packs/day: 1.00     Years: 30.00     Pack years: 30.00     Types: Cigarettes     Quit date: 1997     Years since quittin.1    Smokeless tobacco: Former     Quit date: 12/10/1997   Substance and Sexual Activity    Alcohol use: Yes     Alcohol/week: 5.8 standard drinks     Types: 7 Standard drinks or equivalent per week    Drug use: No    Sexual activity: Never         Allergies   Review of patient's allergies indicates:  No Known Allergies        Physical Exam     Vitals:    23 1516   BP: 109/69   Pulse: 85         Body mass index is 29.49 kg/m².      General: AOx3, NAD   Respiratory:  Symmetric chest rise, normal effort  Oral Cavity:  Oral Tongue mobile, no lesions noted. Hard Palate WNL. No buccal or FOM lesions.  Oropharynx:  No masses/lesions of the posterior pharyngeal wall. Tonsillar fossa without lesions. Soft palate without masses. Midline uvula.   Neck: No scars.  No cervical lymphadenopathy, thyromegaly or thyroid nodules.  Normal range of motion.    Face: House Brackmann I bilaterally.  Clicking of L TMJ.    Assessment/Plan  Problem List Items Addressed This Visit          ENT    Jaw pain    Popping of both temporomandibular joints on opening of jaw     No worrisome lesions head neck exam.  He is primarily concerned about the possibility of head neck cancer.  There is no evidence to suggest there is a head neck cancer present.  He declined referral to Oral surgery for evaluation of his TMJ clicking.  Return p.r.n.          Other Visit Diagnoses       Mouth lesion

## 2023-02-27 ENCOUNTER — PATIENT OUTREACH (OUTPATIENT)
Dept: ADMINISTRATIVE | Facility: HOSPITAL | Age: 83
End: 2023-02-27
Payer: COMMERCIAL

## 2023-02-27 ENCOUNTER — LAB VISIT (OUTPATIENT)
Dept: LAB | Facility: HOSPITAL | Age: 83
End: 2023-02-27
Attending: INTERNAL MEDICINE
Payer: MEDICARE

## 2023-02-27 DIAGNOSIS — K21.9 GASTROESOPHAGEAL REFLUX DISEASE, UNSPECIFIED WHETHER ESOPHAGITIS PRESENT: ICD-10-CM

## 2023-02-27 DIAGNOSIS — F41.9 ANXIETY: ICD-10-CM

## 2023-02-27 DIAGNOSIS — R73.09 ELEVATED GLUCOSE LEVEL: ICD-10-CM

## 2023-02-27 DIAGNOSIS — E78.5 HYPERLIPIDEMIA, UNSPECIFIED HYPERLIPIDEMIA TYPE: ICD-10-CM

## 2023-02-27 DIAGNOSIS — Z00.00 ROUTINE PHYSICAL EXAMINATION: ICD-10-CM

## 2023-02-27 DIAGNOSIS — Z12.5 SCREENING FOR PROSTATE CANCER: ICD-10-CM

## 2023-02-27 DIAGNOSIS — Z12.11 COLON CANCER SCREENING: ICD-10-CM

## 2023-02-27 DIAGNOSIS — I10 PRIMARY HYPERTENSION: ICD-10-CM

## 2023-02-27 LAB
ALBUMIN SERPL BCP-MCNC: 3.8 G/DL (ref 3.5–5.2)
ALP SERPL-CCNC: 67 U/L (ref 55–135)
ALT SERPL W/O P-5'-P-CCNC: 16 U/L (ref 10–44)
ANION GAP SERPL CALC-SCNC: 8 MMOL/L (ref 8–16)
AST SERPL-CCNC: 17 U/L (ref 10–40)
BASOPHILS # BLD AUTO: 0.05 K/UL (ref 0–0.2)
BASOPHILS NFR BLD: 0.9 % (ref 0–1.9)
BILIRUB SERPL-MCNC: 0.6 MG/DL (ref 0.1–1)
BUN SERPL-MCNC: 11 MG/DL (ref 8–23)
CALCIUM SERPL-MCNC: 9.5 MG/DL (ref 8.7–10.5)
CHLORIDE SERPL-SCNC: 107 MMOL/L (ref 95–110)
CHOLEST SERPL-MCNC: 177 MG/DL (ref 120–199)
CHOLEST/HDLC SERPL: 3.1 {RATIO} (ref 2–5)
CO2 SERPL-SCNC: 24 MMOL/L (ref 23–29)
COMPLEXED PSA SERPL-MCNC: 0.35 NG/ML (ref 0–4)
CREAT SERPL-MCNC: 1 MG/DL (ref 0.5–1.4)
DIFFERENTIAL METHOD: ABNORMAL
EOSINOPHIL # BLD AUTO: 0.2 K/UL (ref 0–0.5)
EOSINOPHIL NFR BLD: 4.1 % (ref 0–8)
ERYTHROCYTE [DISTWIDTH] IN BLOOD BY AUTOMATED COUNT: 15.9 % (ref 11.5–14.5)
EST. GFR  (NO RACE VARIABLE): >60 ML/MIN/1.73 M^2
ESTIMATED AVG GLUCOSE: 117 MG/DL (ref 68–131)
GLUCOSE SERPL-MCNC: 88 MG/DL (ref 70–110)
HBA1C MFR BLD: 5.7 % (ref 4–5.6)
HCT VFR BLD AUTO: 40 % (ref 40–54)
HDLC SERPL-MCNC: 58 MG/DL (ref 40–75)
HDLC SERPL: 32.8 % (ref 20–50)
HGB BLD-MCNC: 12.4 G/DL (ref 14–18)
IMM GRANULOCYTES # BLD AUTO: 0.02 K/UL (ref 0–0.04)
IMM GRANULOCYTES NFR BLD AUTO: 0.4 % (ref 0–0.5)
LDLC SERPL CALC-MCNC: 104.8 MG/DL (ref 63–159)
LYMPHOCYTES # BLD AUTO: 1.3 K/UL (ref 1–4.8)
LYMPHOCYTES NFR BLD: 24.3 % (ref 18–48)
MCH RBC QN AUTO: 28.3 PG (ref 27–31)
MCHC RBC AUTO-ENTMCNC: 31 G/DL (ref 32–36)
MCV RBC AUTO: 91 FL (ref 82–98)
MONOCYTES # BLD AUTO: 0.5 K/UL (ref 0.3–1)
MONOCYTES NFR BLD: 8.8 % (ref 4–15)
NEUTROPHILS # BLD AUTO: 3.3 K/UL (ref 1.8–7.7)
NEUTROPHILS NFR BLD: 61.5 % (ref 38–73)
NONHDLC SERPL-MCNC: 119 MG/DL
NRBC BLD-RTO: 0 /100 WBC
PLATELET # BLD AUTO: 248 K/UL (ref 150–450)
PMV BLD AUTO: 10.7 FL (ref 9.2–12.9)
POTASSIUM SERPL-SCNC: 4.6 MMOL/L (ref 3.5–5.1)
PROT SERPL-MCNC: 6.8 G/DL (ref 6–8.4)
RBC # BLD AUTO: 4.38 M/UL (ref 4.6–6.2)
SODIUM SERPL-SCNC: 139 MMOL/L (ref 136–145)
TRIGL SERPL-MCNC: 71 MG/DL (ref 30–150)
TSH SERPL DL<=0.005 MIU/L-ACNC: 0.94 UIU/ML (ref 0.4–4)
WBC # BLD AUTO: 5.43 K/UL (ref 3.9–12.7)

## 2023-02-27 PROCEDURE — 36415 COLL VENOUS BLD VENIPUNCTURE: CPT | Performed by: INTERNAL MEDICINE

## 2023-02-27 PROCEDURE — 84443 ASSAY THYROID STIM HORMONE: CPT | Performed by: INTERNAL MEDICINE

## 2023-02-27 PROCEDURE — 80061 LIPID PANEL: CPT | Performed by: INTERNAL MEDICINE

## 2023-02-27 PROCEDURE — 83036 HEMOGLOBIN GLYCOSYLATED A1C: CPT | Performed by: INTERNAL MEDICINE

## 2023-02-27 PROCEDURE — 80053 COMPREHEN METABOLIC PANEL: CPT | Performed by: INTERNAL MEDICINE

## 2023-02-27 PROCEDURE — 84153 ASSAY OF PSA TOTAL: CPT | Performed by: INTERNAL MEDICINE

## 2023-02-27 PROCEDURE — 85025 COMPLETE CBC W/AUTO DIFF WBC: CPT | Performed by: INTERNAL MEDICINE

## 2023-03-06 ENCOUNTER — LAB VISIT (OUTPATIENT)
Dept: LAB | Facility: HOSPITAL | Age: 83
End: 2023-03-06
Attending: INTERNAL MEDICINE
Payer: MEDICARE

## 2023-03-06 ENCOUNTER — OFFICE VISIT (OUTPATIENT)
Dept: INTERNAL MEDICINE | Facility: CLINIC | Age: 83
End: 2023-03-06
Payer: MEDICARE

## 2023-03-06 VITALS
OXYGEN SATURATION: 98 % | WEIGHT: 223.75 LBS | HEIGHT: 73 IN | SYSTOLIC BLOOD PRESSURE: 130 MMHG | DIASTOLIC BLOOD PRESSURE: 78 MMHG | BODY MASS INDEX: 29.65 KG/M2 | HEART RATE: 70 BPM

## 2023-03-06 DIAGNOSIS — D53.9 MACROCYTIC ANEMIA: ICD-10-CM

## 2023-03-06 DIAGNOSIS — E78.5 HYPERLIPIDEMIA, UNSPECIFIED HYPERLIPIDEMIA TYPE: ICD-10-CM

## 2023-03-06 DIAGNOSIS — K21.9 GASTROESOPHAGEAL REFLUX DISEASE, UNSPECIFIED WHETHER ESOPHAGITIS PRESENT: ICD-10-CM

## 2023-03-06 DIAGNOSIS — K21.9 GASTROESOPHAGEAL REFLUX DISEASE, UNSPECIFIED WHETHER ESOPHAGITIS PRESENT: Primary | ICD-10-CM

## 2023-03-06 DIAGNOSIS — H90.3 SENSORINEURAL HEARING LOSS (SNHL) OF BOTH EARS: ICD-10-CM

## 2023-03-06 DIAGNOSIS — I10 PRIMARY HYPERTENSION: ICD-10-CM

## 2023-03-06 LAB
FERRITIN SERPL-MCNC: 11 NG/ML (ref 20–300)
FOLATE SERPL-MCNC: 5.1 NG/ML (ref 4–24)
IRON SERPL-MCNC: 39 UG/DL (ref 45–160)
SATURATED IRON: 7 % (ref 20–50)
TOTAL IRON BINDING CAPACITY: 534 UG/DL (ref 250–450)
TRANSFERRIN SERPL-MCNC: 361 MG/DL (ref 200–375)
VIT B12 SERPL-MCNC: 242 PG/ML (ref 210–950)

## 2023-03-06 PROCEDURE — 36415 COLL VENOUS BLD VENIPUNCTURE: CPT | Performed by: INTERNAL MEDICINE

## 2023-03-06 PROCEDURE — 82746 ASSAY OF FOLIC ACID SERUM: CPT | Performed by: INTERNAL MEDICINE

## 2023-03-06 PROCEDURE — 99214 PR OFFICE/OUTPT VISIT, EST, LEVL IV, 30-39 MIN: ICD-10-PCS | Mod: S$PBB,,, | Performed by: INTERNAL MEDICINE

## 2023-03-06 PROCEDURE — 99213 OFFICE O/P EST LOW 20 MIN: CPT | Mod: PBBFAC | Performed by: INTERNAL MEDICINE

## 2023-03-06 PROCEDURE — 99999 PR PBB SHADOW E&M-EST. PATIENT-LVL III: ICD-10-PCS | Mod: PBBFAC,,, | Performed by: INTERNAL MEDICINE

## 2023-03-06 PROCEDURE — 82728 ASSAY OF FERRITIN: CPT | Performed by: INTERNAL MEDICINE

## 2023-03-06 PROCEDURE — 84466 ASSAY OF TRANSFERRIN: CPT | Performed by: INTERNAL MEDICINE

## 2023-03-06 PROCEDURE — 99214 OFFICE O/P EST MOD 30 MIN: CPT | Mod: S$PBB,,, | Performed by: INTERNAL MEDICINE

## 2023-03-06 PROCEDURE — 99999 PR PBB SHADOW E&M-EST. PATIENT-LVL III: CPT | Mod: PBBFAC,,, | Performed by: INTERNAL MEDICINE

## 2023-03-06 PROCEDURE — 82607 VITAMIN B-12: CPT | Performed by: INTERNAL MEDICINE

## 2023-03-06 RX ORDER — HYDROCORTISONE 25 MG/G
CREAM TOPICAL 2 TIMES DAILY
COMMUNITY
End: 2023-03-06 | Stop reason: SDUPTHER

## 2023-03-06 RX ORDER — CETIRIZINE HYDROCHLORIDE 5 MG/1
5 TABLET ORAL DAILY
Qty: 90 TABLET | Refills: 3 | Status: SHIPPED | OUTPATIENT
Start: 2023-03-06

## 2023-03-06 RX ORDER — ATORVASTATIN CALCIUM 10 MG/1
10 TABLET, FILM COATED ORAL DAILY
Qty: 90 TABLET | Refills: 3 | Status: SHIPPED | OUTPATIENT
Start: 2023-03-06 | End: 2024-02-19

## 2023-03-06 RX ORDER — HYDROCORTISONE 25 MG/G
CREAM TOPICAL 2 TIMES DAILY
Qty: 20 G | Refills: 3 | Status: SHIPPED | OUTPATIENT
Start: 2023-03-06

## 2023-03-06 RX ORDER — CITALOPRAM 20 MG/1
20 TABLET, FILM COATED ORAL DAILY
Qty: 90 TABLET | Refills: 3 | Status: SHIPPED | OUTPATIENT
Start: 2023-03-06 | End: 2024-02-19

## 2023-03-06 RX ORDER — OMEPRAZOLE 40 MG/1
40 CAPSULE, DELAYED RELEASE ORAL DAILY
Qty: 90 CAPSULE | Refills: 3 | Status: SHIPPED | OUTPATIENT
Start: 2023-03-06 | End: 2024-02-19

## 2023-03-06 RX ORDER — AMLODIPINE BESYLATE 5 MG/1
5 TABLET ORAL DAILY
Qty: 90 TABLET | Refills: 3 | Status: SHIPPED | OUTPATIENT
Start: 2023-03-06 | End: 2024-03-01

## 2023-03-06 NOTE — PROGRESS NOTES
Subjective:       Patient ID: Adrian Healy is a 82 y.o. male.    Chief Complaint: Annual Exam    HPI: Pt here for Annual Exam and review of medical problems. Stable showed stable PSA, lipid and chem. But A1c is up 1/10th and the Hgb has dropped a bit more. MCV normal now, but was elevated recently. We will investigate. No new problems or meds. No bleeding and no blood donation.   He otherwise is feeling well.  Tries to stay active takes his medication.  Up-to-date with colon screening.  Had a colonoscopy less than a year ago.  He saw ENT for possible oral lesion noted by his dentist but that evaluation came out okay as well  Review of Systems   Constitutional:  Negative for chills, fatigue and fever.   HENT:  Positive for hearing loss (stable with hearing aids). Negative for nosebleeds and trouble swallowing.    Eyes:  Negative for pain and visual disturbance.   Respiratory:  Negative for cough, shortness of breath and wheezing.    Cardiovascular:  Negative for chest pain and palpitations.   Gastrointestinal:  Negative for abdominal pain, constipation, diarrhea, nausea and vomiting.   Genitourinary:  Negative for difficulty urinating and hematuria.   Musculoskeletal:  Negative for arthralgias, back pain and neck pain.   Integumentary:  Negative for rash.   Neurological:  Negative for dizziness and headaches.   Hematological:  Does not bruise/bleed easily.   Psychiatric/Behavioral:  Positive for dysphoric mood (stable on medication). Negative for sleep disturbance. The patient is nervous/anxious (stable on medication).          Past Medical History:   Diagnosis Date    GERD (gastroesophageal reflux disease)     HEARING LOSS     Hyperlipidemia     Hypertension      Past Surgical History:   Procedure Laterality Date    COLONOSCOPY N/A 7/19/2018    Procedure: COLONOSCOPY;  Surgeon: Abilio Ferrara MD;  Location: [address];  Service: Endoscopy;  Laterality: N/A;    COLONOSCOPY N/A 4/27/2022    Procedure:  COLONOSCOPY;  Surgeon: Rudy Grace MD;  Location: [facility] ([address] [place] [address]);  Service: Endoscopy;  Laterality: N/A;  fully vaccinated    EYE SURGERY        Patient Active Problem List   Diagnosis    Hyperlipidemia    Hypertension    GERD (gastroesophageal reflux disease)    Hearing loss, sensorineural    Special screening for malignant neoplasms, colon    Anxiety    Jaw pain    Popping of both temporomandibular joints on opening of jaw        Objective:      Physical Exam  Constitutional:       General: He is not in acute distress.     Appearance: He is well-developed.   HENT:      Head: Normocephalic and atraumatic.      Right Ear: Tympanic membrane, ear canal and external ear normal.      Left Ear: Tympanic membrane, ear canal and external ear normal.      Mouth/Throat:      Pharynx: No oropharyngeal exudate or posterior oropharyngeal erythema.   Eyes:      General: No scleral icterus.     Conjunctiva/sclera: Conjunctivae normal.      Pupils: Pupils are equal, round, and reactive to light.   Neck:      Thyroid: No thyromegaly.      Comments: No supraclavicular nodes palpated  Cardiovascular:      Rate and Rhythm: Normal rate and regular rhythm.      Pulses: Normal pulses.      Heart sounds: Normal heart sounds. No murmur heard.  Pulmonary:      Effort: Pulmonary effort is normal.      Breath sounds: Normal breath sounds. No wheezing.   Abdominal:      General: Bowel sounds are normal.      Palpations: Abdomen is soft. There is no mass.      Tenderness: There is no abdominal tenderness.   Genitourinary:     Comments: Pt deferred  Musculoskeletal:         General: No tenderness.      Cervical back: Normal range of motion and neck supple.      Right lower leg: No edema.      Left lower leg: No edema.   Lymphadenopathy:      Cervical: No cervical adenopathy.   Skin:     Coloration: Skin is not jaundiced or pale.   Neurological:      General: No focal deficit present.      Mental Status: He is alert and oriented to  person, place, and time.   Psychiatric:         Mood and Affect: Mood normal.         Behavior: Behavior normal.       Assessment:       Problem List Items Addressed This Visit          ENT    Hearing loss, sensorineural    Relevant Orders    Iron and TIBC    Ferritin    Vitamin B12    Folate       Cardiac/Vascular    Hyperlipidemia    Relevant Orders    Iron and TIBC    Ferritin    Vitamin B12    Folate    Hypertension    Relevant Orders    Iron and TIBC    Ferritin    Vitamin B12    Folate       GI    GERD (gastroesophageal reflux disease) - Primary    Relevant Orders    Iron and TIBC    Ferritin    Vitamin B12    Folate     Other Visit Diagnoses       Macrocytic anemia        Relevant Orders    Iron and TIBC    Ferritin    Vitamin B12    Folate            Plan:         Adrian was seen today for annual exam.    Diagnoses and all orders for this visit:    Gastroesophageal reflux disease, unspecified whether esophagitis present  -     Iron and TIBC; Future  -     Ferritin; Future  -     Vitamin B12; Future  -     Folate; Future    Primary hypertension  -     Iron and TIBC; Future  -     Ferritin; Future  -     Vitamin B12; Future  -     Folate; Future    Hyperlipidemia, unspecified hyperlipidemia type  -     Iron and TIBC; Future  -     Ferritin; Future  -     Vitamin B12; Future  -     Folate; Future    Sensorineural hearing loss (SNHL) of both ears  -     Iron and TIBC; Future  -     Ferritin; Future  -     Vitamin B12; Future  -     Folate; Future    Macrocytic anemia  -     Iron and TIBC; Future  -     Ferritin; Future  -     Vitamin B12; Future  -     Folate; Future    Other orders  -     omeprazole (PRILOSEC) 40 MG capsule; Take 1 capsule (40 mg total) by mouth once daily.  -     citalopram (CELEXA) 20 MG tablet; Take 1 tablet (20 mg total) by mouth once daily.  -     cetirizine (ZYRTEC) 5 MG tablet; Take 1 tablet (5 mg total) by mouth once daily.  -     atorvastatin (LIPITOR) 10 MG tablet; Take 1 tablet (10  "mg total) by mouth once daily.  -     amLODIPine (NORVASC) 5 MG tablet; Take 1 tablet (5 mg total) by mouth once daily.  -     hydrocortisone 2.5 % cream; Apply topically 2 (two) times daily.       Monitor for bleeding.  Check some basic labs as above and depending on results consider further testing.  Also follow-up at 6 months for any problems 1 year per routine              Portions of this note may have been created with voice recognition software. Occasional "wrong-word" or "sound-a-like" substitutions may have occurred due to the inherent limitations of voice recognition software. Please, read the note carefully and recognize, using context, where substitutions have occurred.  "

## 2023-03-09 ENCOUNTER — PATIENT MESSAGE (OUTPATIENT)
Dept: INTERNAL MEDICINE | Facility: CLINIC | Age: 83
End: 2023-03-09
Payer: COMMERCIAL

## 2023-03-09 DIAGNOSIS — D50.9 IRON DEFICIENCY ANEMIA, UNSPECIFIED IRON DEFICIENCY ANEMIA TYPE: Primary | ICD-10-CM

## 2023-03-09 RX ORDER — FERROUS SULFATE 325(65) MG
325 TABLET ORAL DAILY
Qty: 30 TABLET | Refills: 3 | Status: SHIPPED | OUTPATIENT
Start: 2023-03-09 | End: 2024-03-11 | Stop reason: SDUPTHER

## 2023-03-09 NOTE — TELEPHONE ENCOUNTER
Patient with iron deficiency anemia.  I sent an iron prescription to the pharmacy which he should start taking.  I would like him to do a stool sample for blood now or soon.  The labs I ordered would be for about 2-3 months.

## 2023-03-16 ENCOUNTER — LAB VISIT (OUTPATIENT)
Dept: LAB | Facility: HOSPITAL | Age: 83
End: 2023-03-16
Payer: MEDICARE

## 2023-03-16 DIAGNOSIS — D50.9 IRON DEFICIENCY ANEMIA, UNSPECIFIED IRON DEFICIENCY ANEMIA TYPE: ICD-10-CM

## 2023-03-16 LAB — OB PNL STL: POSITIVE

## 2023-03-16 PROCEDURE — 82272 OCCULT BLD FECES 1-3 TESTS: CPT | Performed by: INTERNAL MEDICINE

## 2023-03-17 ENCOUNTER — PATIENT MESSAGE (OUTPATIENT)
Dept: INTERNAL MEDICINE | Facility: CLINIC | Age: 83
End: 2023-03-17
Payer: COMMERCIAL

## 2023-03-17 ENCOUNTER — TELEPHONE (OUTPATIENT)
Dept: INTERNAL MEDICINE | Facility: CLINIC | Age: 83
End: 2023-03-17
Payer: COMMERCIAL

## 2023-03-17 DIAGNOSIS — K21.9 GASTROESOPHAGEAL REFLUX DISEASE, UNSPECIFIED WHETHER ESOPHAGITIS PRESENT: Primary | ICD-10-CM

## 2023-03-17 DIAGNOSIS — R19.5 HEME + STOOL: ICD-10-CM

## 2023-03-17 DIAGNOSIS — D50.9 IRON DEFICIENCY ANEMIA, UNSPECIFIED IRON DEFICIENCY ANEMIA TYPE: ICD-10-CM

## 2023-03-17 NOTE — TELEPHONE ENCOUNTER
----- Message from Kassi Phillips sent at 3/17/2023  3:50 PM CDT -----  Contact: Pt Mobile [phone]  Patient would like to speak with you about him wanting to order a Upper GI Scope and she said that he was told that he can be seen in July and he would like to be seen sooner.

## 2023-03-17 NOTE — TELEPHONE ENCOUNTER
Spoke to pt. He states he called to schedule his scope but he cannot be scheduled until July. Pt would like to have this done at a different location for sooner availability

## 2023-03-19 NOTE — TELEPHONE ENCOUNTER
Can WE call [facility] and confirm that they do not even schedule EGD appointments for 7 months?   How do we get an EGD for heme + stool and anemia? Where are they wanting us to send these?   We must have the process wrong?

## 2023-03-20 ENCOUNTER — TELEPHONE (OUTPATIENT)
Dept: ENDOSCOPY | Facility: HOSPITAL | Age: 83
End: 2023-03-20
Payer: COMMERCIAL

## 2023-03-20 DIAGNOSIS — R19.5 HEME POSITIVE STOOL: ICD-10-CM

## 2023-03-20 DIAGNOSIS — D50.9 IRON DEFICIENCY ANEMIA, UNSPECIFIED IRON DEFICIENCY ANEMIA TYPE: ICD-10-CM

## 2023-03-20 DIAGNOSIS — K21.9 GASTROESOPHAGEAL REFLUX DISEASE, UNSPECIFIED WHETHER ESOPHAGITIS PRESENT: Primary | ICD-10-CM

## 2023-03-20 NOTE — TELEPHONE ENCOUNTER
Called endoscopy. They state the pt is scheduled for scope on 3/21    Spoke to pt's wife and confirmed they know about the scope

## 2023-03-21 ENCOUNTER — ANESTHESIA (OUTPATIENT)
Dept: ENDOSCOPY | Facility: HOSPITAL | Age: 83
End: 2023-03-21
Payer: MEDICARE

## 2023-03-21 ENCOUNTER — HOSPITAL ENCOUNTER (OUTPATIENT)
Facility: HOSPITAL | Age: 83
Discharge: HOME | End: 2023-03-21
Attending: INTERNAL MEDICINE | Admitting: INTERNAL MEDICINE
Payer: MEDICARE

## 2023-03-21 ENCOUNTER — PATIENT MESSAGE (OUTPATIENT)
Dept: INTERNAL MEDICINE | Facility: CLINIC | Age: 83
End: 2023-03-21
Payer: COMMERCIAL

## 2023-03-21 ENCOUNTER — ANESTHESIA EVENT (OUTPATIENT)
Dept: ENDOSCOPY | Facility: HOSPITAL | Age: 83
End: 2023-03-21
Payer: MEDICARE

## 2023-03-21 VITALS
OXYGEN SATURATION: 98 % | BODY MASS INDEX: 29.82 KG/M2 | SYSTOLIC BLOOD PRESSURE: 133 MMHG | DIASTOLIC BLOOD PRESSURE: 84 MMHG | WEIGHT: 225 LBS | TEMPERATURE: 98 F | HEIGHT: 73 IN | HEART RATE: 79 BPM | RESPIRATION RATE: 18 BRPM

## 2023-03-21 DIAGNOSIS — D50.9 IRON DEFICIENCY ANEMIA: ICD-10-CM

## 2023-03-21 PROCEDURE — 37000009 HC ANESTHESIA EA ADD 15 MINS: Performed by: INTERNAL MEDICINE

## 2023-03-21 PROCEDURE — 27201012 HC FORCEPS, HOT/COLD, DISP: Performed by: INTERNAL MEDICINE

## 2023-03-21 PROCEDURE — 27201089 HC SNARE, DISP (ANY): Performed by: INTERNAL MEDICINE

## 2023-03-21 PROCEDURE — 43251 EGD REMOVE LESION SNARE: CPT | Performed by: INTERNAL MEDICINE

## 2023-03-21 PROCEDURE — 43239 EGD BIOPSY SINGLE/MULTIPLE: CPT | Mod: 59 | Performed by: INTERNAL MEDICINE

## 2023-03-21 PROCEDURE — 88341 IMHCHEM/IMCYTCHM EA ADD ANTB: CPT | Performed by: PATHOLOGY

## 2023-03-21 PROCEDURE — 88342 IMHCHEM/IMCYTCHM 1ST ANTB: CPT | Performed by: PATHOLOGY

## 2023-03-21 PROCEDURE — 25000003 PHARM REV CODE 250: Performed by: NURSE ANESTHETIST, CERTIFIED REGISTERED

## 2023-03-21 PROCEDURE — 43239 PR EGD, FLEX, W/BIOPSY, SGL/MULTI: ICD-10-PCS | Mod: 59,,, | Performed by: INTERNAL MEDICINE

## 2023-03-21 PROCEDURE — 88342 IMHCHEM/IMCYTCHM 1ST ANTB: CPT | Mod: 26,,, | Performed by: PATHOLOGY

## 2023-03-21 PROCEDURE — 43239 EGD BIOPSY SINGLE/MULTIPLE: CPT | Mod: 59,,, | Performed by: INTERNAL MEDICINE

## 2023-03-21 PROCEDURE — 63600175 PHARM REV CODE 636 W HCPCS: Performed by: NURSE ANESTHETIST, CERTIFIED REGISTERED

## 2023-03-21 PROCEDURE — 88342 CHG IMMUNOCYTOCHEMISTRY: ICD-10-PCS | Mod: 26,,, | Performed by: PATHOLOGY

## 2023-03-21 PROCEDURE — 88305 TISSUE EXAM BY PATHOLOGIST: ICD-10-PCS | Mod: 26,,, | Performed by: PATHOLOGY

## 2023-03-21 PROCEDURE — 43251 PR EGD, FLEX, W/REMOVAL, TUMOR/POLYP/LESION(S), SNARE: ICD-10-PCS | Mod: ,,, | Performed by: INTERNAL MEDICINE

## 2023-03-21 PROCEDURE — E9220 PRA ENDO ANESTHESIA: ICD-10-PCS | Mod: ,,, | Performed by: NURSE ANESTHETIST, CERTIFIED REGISTERED

## 2023-03-21 PROCEDURE — 27200997: Performed by: INTERNAL MEDICINE

## 2023-03-21 PROCEDURE — 43251 EGD REMOVE LESION SNARE: CPT | Mod: ,,, | Performed by: INTERNAL MEDICINE

## 2023-03-21 PROCEDURE — 88341 IMHCHEM/IMCYTCHM EA ADD ANTB: CPT | Mod: 26,,, | Performed by: PATHOLOGY

## 2023-03-21 PROCEDURE — E9220 PRA ENDO ANESTHESIA: HCPCS | Mod: ,,, | Performed by: NURSE ANESTHETIST, CERTIFIED REGISTERED

## 2023-03-21 PROCEDURE — 25000003 PHARM REV CODE 250: Performed by: INTERNAL MEDICINE

## 2023-03-21 PROCEDURE — 27201042 HC RETRIEVAL NET: Performed by: INTERNAL MEDICINE

## 2023-03-21 PROCEDURE — 88341 PR IHC OR ICC EACH ADD'L SINGLE ANTIBODY  STAINPR: ICD-10-PCS | Mod: 26,,, | Performed by: PATHOLOGY

## 2023-03-21 PROCEDURE — 88305 TISSUE EXAM BY PATHOLOGIST: CPT | Mod: 59 | Performed by: PATHOLOGY

## 2023-03-21 PROCEDURE — 37000008 HC ANESTHESIA 1ST 15 MINUTES: Performed by: INTERNAL MEDICINE

## 2023-03-21 PROCEDURE — 88305 TISSUE EXAM BY PATHOLOGIST: CPT | Mod: 26,,, | Performed by: PATHOLOGY

## 2023-03-21 RX ORDER — PROPOFOL 10 MG/ML
VIAL (ML) INTRAVENOUS CONTINUOUS PRN
Status: DISCONTINUED | OUTPATIENT
Start: 2023-03-21 | End: 2023-03-21

## 2023-03-21 RX ORDER — PROPOFOL 10 MG/ML
VIAL (ML) INTRAVENOUS
Status: DISCONTINUED | OUTPATIENT
Start: 2023-03-21 | End: 2023-03-21

## 2023-03-21 RX ORDER — LIDOCAINE HYDROCHLORIDE 20 MG/ML
INJECTION, SOLUTION EPIDURAL; INFILTRATION; INTRACAUDAL; PERINEURAL
Status: DISCONTINUED | OUTPATIENT
Start: 2023-03-21 | End: 2023-03-21

## 2023-03-21 RX ORDER — SODIUM CHLORIDE 9 MG/ML
INJECTION, SOLUTION INTRAVENOUS CONTINUOUS
Status: DISCONTINUED | OUTPATIENT
Start: 2023-03-21 | End: 2023-03-21 | Stop reason: HOSPADM

## 2023-03-21 RX ORDER — PHENYLEPHRINE HYDROCHLORIDE 10 MG/ML
INJECTION INTRAVENOUS
Status: DISCONTINUED | OUTPATIENT
Start: 2023-03-21 | End: 2023-03-21

## 2023-03-21 RX ADMIN — SODIUM CHLORIDE: 9 INJECTION, SOLUTION INTRAVENOUS at 10:03

## 2023-03-21 RX ADMIN — Medication 200 MCG/KG/MIN: at 10:03

## 2023-03-21 RX ADMIN — PROPOFOL 100 MG: 10 INJECTION, EMULSION INTRAVENOUS at 10:03

## 2023-03-21 RX ADMIN — PHENYLEPHRINE HYDROCHLORIDE 200 MCG: 10 INJECTION INTRAVENOUS at 10:03

## 2023-03-21 RX ADMIN — LIDOCAINE HYDROCHLORIDE 100 MG: 20 INJECTION, SOLUTION EPIDURAL; INFILTRATION; INTRACAUDAL; PERINEURAL at 10:03

## 2023-03-21 NOTE — ANESTHESIA PREPROCEDURE EVALUATION
03/21/2023  Adrian Healy is a 82 y.o., male.  Past Medical History:   Diagnosis Date    GERD (gastroesophageal reflux disease)     HEARING LOSS     Hyperlipidemia     Hypertension      Past Surgical History:   Procedure Laterality Date    COLONOSCOPY N/A 7/19/2018    Procedure: COLONOSCOPY;  Surgeon: Abilio Ferrara MD;  Location: [address]);  Service: Endoscopy;  Laterality: N/A;    COLONOSCOPY N/A 4/27/2022    Procedure: COLONOSCOPY;  Surgeon: Rudy Grace MD;  Location: [address]);  Service: Endoscopy;  Laterality: N/A;  fully vaccinated    EYE SURGERY       Patient Active Problem List   Diagnosis    Hyperlipidemia    Hypertension    GERD (gastroesophageal reflux disease)    Hearing loss, sensorineural    Special screening for malignant neoplasms, colon    Anxiety    Jaw pain    Popping of both temporomandibular joints on opening of jaw           Pre-op Assessment    I have reviewed the Patient Summary Reports.     I have reviewed the Nursing Notes. I have reviewed the NPO Status.   I have reviewed the Medications.     Review of Systems      Physical Exam  General: Alert and Oriented    Airway:  Mallampati: I   Mouth Opening: Normal  TM Distance: Normal  Tongue: Normal  Neck ROM: Normal ROM    Dental:  Intact        Anesthesia Plan  Type of Anesthesia, risks & benefits discussed:    Anesthesia Type: Gen Natural Airway  Intra-op Monitoring Plan: Standard ASA Monitors  Induction:  IV  Airway Plan: Direct  Informed Consent: Informed consent signed with the Patient and all parties understand the risks and agree with anesthesia plan.  All questions answered.   ASA Score: 2    Ready For Surgery From Anesthesia Perspective.     .

## 2023-03-21 NOTE — TRANSFER OF CARE
"Anesthesia Transfer of Care Note    Patient: Adrian Healy    Procedure(s) Performed: Procedure(s) (LRB):  ESOPHAGOGASTRODUODENOSCOPY (EGD) (N/A)    Patient location: PACU    Anesthesia Type: general    Transport from OR: Transported from OR on room air with adequate spontaneous ventilation    Post pain: adequate analgesia    Post assessment: no apparent anesthetic complications    Post vital signs: stable    Level of consciousness: awake    Nausea/Vomiting: no nausea/vomiting    Complications: none    Transfer of care protocol was followed      Last vitals:   Visit Vitals  /68(BP Location: Left arm, Patient Position: Lying)   Pulse 79   Temp 36.4 °C (97.5 °F) (Temporal)   Resp 16   Ht 6' 1" (1.854 m)   Wt 102.1 kg (225 lb)   SpO2 99%   BMI 29.69 kg/m²     "

## 2023-03-21 NOTE — PLAN OF CARE
Patient in recovery.  Discharge instructions and criteria met. Patient verbalized understanding.  Discharge home with responsible adult.

## 2023-03-21 NOTE — ANESTHESIA POSTPROCEDURE EVALUATION
Anesthesia Post Evaluation    Patient: Adrian Healy    Procedure(s) Performed: Procedure(s) (LRB):  ESOPHAGOGASTRODUODENOSCOPY (EGD) (N/A)    Final Anesthesia Type: general      Level of consciousness: awake and alert  Post-procedure vital signs: reviewed and stable  Pain control: Pain has been treated.  Airway patency: patent    PONV status: Absent or treated.  Anesthetic complications: no      Cardiovascular status: hemodynamically stable  Respiratory status: unassisted  Hydration status: euvolemic            Vitals Value Taken Time   /74 03/21/23 1133   Temp 36.5 °C (97.7 °F) 03/21/23 1124   Pulse 71 03/21/23 1133   Resp 18 03/21/23 1133   SpO2 100 % 03/21/23 1133         No case tracking events are documented in the log.      Pain/Yuri Score: Yuri Score: 9 (3/21/2023 11:18 AM)

## 2023-03-21 NOTE — PROVATION PATIENT INSTRUCTIONS
Discharge Summary/Instructions after an Endoscopic Procedure  Patient Name: Adrian Healy  Patient MRN: [id number]  Patient YOB: 1940  Tuesday, March 21, 2023  Wilmar Ordaz MD  Dear patient,  As a result of recent federal legislation (The Federal Cures Act), you may   receive lab or pathology results from your procedure in your [facility]   account before your physician is able to contact you. Your physician or   their representative will relay the results to you with their   recommendations at their soonest availability.  Thank you,  RESTRICTIONS:  During your procedure today, you received medications for sedation.  These   medications may affect your judgment, balance and coordination.  Therefore,   for 24 hours, you have the following restrictions:   - DO NOT drive a car, operate machinery, make legal/financial decisions,   sign important papers or drink alcohol.    ACTIVITY:  Today: no heavy lifting, straining or running due to procedural   sedation/anesthesia.  The following day: return to full activity including work.  DIET:  Eat and drink normally unless instructed otherwise.     TREATMENT FOR COMMON SIDE EFFECTS:  - Mild abdominal pain, nausea, belching, bloating or excessive gas:  rest,   eat lightly and use a heating pad.  - Sore Throat: treat with throat lozenges and/or gargle with warm salt   water.  - Because air was used during the procedure, expelling large amounts of air   from your rectum or belching is normal.  - If a bowel prep was taken, you may not have a bowel movement for 1-3 days.    This is normal.  SYMPTOMS TO WATCH FOR AND REPORT TO YOUR PHYSICIAN:  1. Abdominal pain or bloating, other than gas cramps.  2. Chest pain.  3. Back pain.  4. Signs of infection such as: chills or fever occurring within 24 hours   after the procedure.  5. Rectal bleeding, which would show as bright red, maroon, or black stools.   (A tablespoon of blood from the rectum is not serious, especially  if   hemorrhoids are present.)  6. Vomiting.  7. Weakness or dizziness.  GO DIRECTLY TO THE NEAREST EMERGENCY ROOM IF YOU HAVE ANY OF THE FOLLOWING:      Difficulty breathing              Chills and/or fever over 101 F   Persistent vomiting and/or vomiting blood   Severe abdominal pain   Severe chest pain   Black, tarry stools   Bleeding- more than one tablespoon   Any other symptom or condition that you feel may need urgent attention  Your doctor recommends these additional instructions:  If any biopsies were taken, your doctors clinic will contact you in 1 to 2   weeks with any results.  - Discharge patient to home.   - Resume previous diet today.   - Continue present medications.   - Continue omeprazole 40 mg daily.   - Await pathology results.   - Repeat upper endoscopy at appointment to be scheduled for retreatment.   - Subsequent EGD should be scheduled in a 1 hour slot to allow for resection   of all large polyps.  For questions, problems or results please call your physician - Wilmar Ordaz MD at Work:  [phone].  [facility], EMERGENCY ROOM PHONE NUMBER: [phone]  IF A COMPLICATION OR EMERGENCY SITUATION ARISES AND YOU ARE UNABLE TO REACH   YOUR PHYSICIAN - GO DIRECTLY TO THE EMERGENCY ROOM.  Wilmar Ordaz MD  3/21/2023 11:14:16 AM  This report has been verified and signed electronically.  Dear patient,  As a result of recent federal legislation (The Federal Cures Act), you may   receive lab or pathology results from your procedure in your [facility]   account before your physician is able to contact you. Your physician or   their representative will relay the results to you with their   recommendations at their soonest availability.  Thank you,  PROVATION

## 2023-03-21 NOTE — H&P
Short Stay Endoscopy History and Physical    PCP - Xander Will MD    Procedure - EGD  ASA - 2  Mallampati - per anesthesia  History of Anesthesia problems - no  Family history Anesthesia problems -  no     HPI:  This is a 82 y.o. male here for evaluation of :     Reflux - no  Dysphagia - no  Abdominal pain - no  Diarrhea - no  Anemia - yes  GI bleeding - no    ROS:  CONSTITUTIONAL: Denies weight change,  fatigue, fevers, chills, night sweats.  CARDIOVASCULAR: Denies chest pain, shortness of breath, orthopnea and edema.  RESPIRATORY: Denies cough, hemoptysis, dyspnea, and wheezing.  GI: See HPI.    Medical History:   Past Medical History:   Diagnosis Date    GERD (gastroesophageal reflux disease)     HEARING LOSS     Hyperlipidemia     Hypertension        Surgical History:   Past Surgical History:   Procedure Laterality Date    COLONOSCOPY N/A 2018    Procedure: COLONOSCOPY;  Surgeon: Abilio Ferrara MD;  Location: [facility] ([address] [place] [address]);  Service: Endoscopy;  Laterality: N/A;    COLONOSCOPY N/A 2022    Procedure: COLONOSCOPY;  Surgeon: Rudy Grace MD;  Location: [facility] ([address] [place] [address]);  Service: Endoscopy;  Laterality: N/A;  fully vaccinated    EYE SURGERY         Family History:  Family History   Problem Relation Age of Onset    Liver cancer Sister     Melanoma Sister     Cancer Brother     Heart disease Brother         pacemaker    Colon cancer Brother     Prostate cancer Brother        Social History:   Social History     Tobacco Use    Smoking status: Former     Packs/day: 1.00     Years: 30.00     Pack years: 30.00     Types: Cigarettes     Quit date: 1997     Years since quittin.2    Smokeless tobacco: Former     Quit date: 12/10/1997   Substance Use Topics    Alcohol use: Yes     Alcohol/week: 5.8 standard drinks     Types: 7 Standard drinks or equivalent per week     Comment: rum/coke 3-4 daily    Drug use: No       Allergies: Reviewed    Medications:   No current  facility-administered medications on file prior to encounter.     Current Outpatient Medications on File Prior to Encounter   Medication Sig Dispense Refill    amLODIPine (NORVASC) 5 MG tablet Take 1 tablet (5 mg total) by mouth once daily. 90 tablet 3    atorvastatin (LIPITOR) 10 MG tablet Take 1 tablet (10 mg total) by mouth once daily. 90 tablet 3    cetirizine (ZYRTEC) 5 MG tablet Take 1 tablet (5 mg total) by mouth once daily. 90 tablet 3    citalopram (CELEXA) 20 MG tablet Take 1 tablet (20 mg total) by mouth once daily. 90 tablet 3    ferrous sulfate (FEOSOL) 325 mg (65 mg iron) Tab tablet Take 1 tablet (325 mg total) by mouth once daily. 30 tablet 3    omeprazole (PRILOSEC) 40 MG capsule Take 1 capsule (40 mg total) by mouth once daily. 90 capsule 3    hydrocortisone 2.5 % cream Apply topically 2 (two) times daily. 20 g 3       Physical Exam:  Vital Signs:   Vitals:    03/21/23 1010   BP: (!) 142/74   Pulse: 79   Resp: 16   Temp: 97.5 °F (36.4 °C)     General Appearance: Well appearing in no acute distress  ENT: OP clear  Chest: CTA B  CV: RRR, no m/r/g  Abd: s/nt/nd/nabs  Ext: no edema    Labs:  Reviewed    Plan:  I have explained the risks and benefits of endoscopy procedures to the patient including but not limited to bleeding, perforation, infection, and death. The patient wishes to proceed.

## 2023-03-28 ENCOUNTER — PES CALL (OUTPATIENT)
Dept: ADMINISTRATIVE | Facility: CLINIC | Age: 83
End: 2023-03-28
Payer: COMMERCIAL

## 2023-03-29 LAB
FINAL PATHOLOGIC DIAGNOSIS: NORMAL
GROSS: NORMAL
Lab: NORMAL
MICROSCOPIC EXAM: NORMAL

## 2023-03-31 ENCOUNTER — PATIENT MESSAGE (OUTPATIENT)
Dept: INTERNAL MEDICINE | Facility: CLINIC | Age: 83
End: 2023-03-31
Payer: COMMERCIAL

## 2023-04-06 ENCOUNTER — TELEPHONE (OUTPATIENT)
Dept: ENDOSCOPY | Facility: HOSPITAL | Age: 83
End: 2023-04-06

## 2023-04-06 DIAGNOSIS — K31.7 GASTRIC POLYP: Primary | ICD-10-CM

## 2023-04-11 ENCOUNTER — TELEPHONE (OUTPATIENT)
Dept: INTERNAL MEDICINE | Facility: CLINIC | Age: 83
End: 2023-04-11
Payer: COMMERCIAL

## 2023-04-11 ENCOUNTER — PATIENT MESSAGE (OUTPATIENT)
Dept: INTERNAL MEDICINE | Facility: CLINIC | Age: 83
End: 2023-04-11
Payer: COMMERCIAL

## 2023-04-11 NOTE — TELEPHONE ENCOUNTER
[place], I hope this is not a duplicate question.   Is Mr Healy supposed to have a follow up scope? He is reaching out to me based on his EGD report and I think I am seeing that Dr Maloney may have placed a new order or sent a message?   I just did not know how to connect the pt to you all. But maybe you all are going to reach out to him? Just let me know if I can help in any way.

## 2023-04-12 ENCOUNTER — PATIENT MESSAGE (OUTPATIENT)
Dept: INTERNAL MEDICINE | Facility: CLINIC | Age: 83
End: 2023-04-12
Payer: COMMERCIAL

## 2023-04-13 ENCOUNTER — PATIENT MESSAGE (OUTPATIENT)
Dept: ENDOSCOPY | Facility: HOSPITAL | Age: 83
End: 2023-04-13
Payer: COMMERCIAL

## 2023-04-13 ENCOUNTER — TELEPHONE (OUTPATIENT)
Dept: ENDOSCOPY | Facility: HOSPITAL | Age: 83
End: 2023-04-13

## 2023-04-13 ENCOUNTER — TELEPHONE (OUTPATIENT)
Dept: ENDOSCOPY | Facility: HOSPITAL | Age: 83
End: 2023-04-13
Payer: COMMERCIAL

## 2023-04-13 NOTE — TELEPHONE ENCOUNTER
Telephoned pt to schedule EGD.  Spoke with pt and procedure scheduled for 5/1/23 at 10:00am.  Reviewed history and medications.  Instructed on procedure and preparation.  Pt verbalized understanding.  Instructions sent via patient portal.  Instructed pt on how to locate prep instructions within the portal.  Pt verbalized understanding.

## 2023-04-13 NOTE — TELEPHONE ENCOUNTER
----- Message from Felicita Vidal sent at 4/13/2023 10:56 AM CDT -----  Regarding: pt advice  Contact: [phone]  Pt calling in to speak [place]  in regard to endoscopy appt. Pls call

## 2023-05-01 ENCOUNTER — ANESTHESIA EVENT (OUTPATIENT)
Dept: ENDOSCOPY | Facility: HOSPITAL | Age: 83
End: 2023-05-01
Payer: MEDICARE

## 2023-05-01 ENCOUNTER — HOSPITAL ENCOUNTER (OUTPATIENT)
Facility: HOSPITAL | Age: 83
Discharge: HOME | End: 2023-05-01
Attending: INTERNAL MEDICINE | Admitting: INTERNAL MEDICINE
Payer: MEDICARE

## 2023-05-01 ENCOUNTER — ANESTHESIA (OUTPATIENT)
Dept: ENDOSCOPY | Facility: HOSPITAL | Age: 83
End: 2023-05-01
Payer: MEDICARE

## 2023-05-01 VITALS
TEMPERATURE: 98 F | HEIGHT: 73 IN | OXYGEN SATURATION: 95 % | BODY MASS INDEX: 29.16 KG/M2 | HEART RATE: 64 BPM | DIASTOLIC BLOOD PRESSURE: 85 MMHG | SYSTOLIC BLOOD PRESSURE: 127 MMHG | WEIGHT: 220 LBS | RESPIRATION RATE: 16 BRPM

## 2023-05-01 DIAGNOSIS — K31.7 MULTIPLE GASTRIC POLYPS: ICD-10-CM

## 2023-05-01 DIAGNOSIS — I48.91 NEW ONSET A-FIB: ICD-10-CM

## 2023-05-01 LAB
ALBUMIN SERPL BCP-MCNC: 3.2 G/DL (ref 3.5–5.2)
ALP SERPL-CCNC: 52 U/L (ref 55–135)
ALT SERPL W/O P-5'-P-CCNC: 10 U/L (ref 10–44)
ANION GAP SERPL CALC-SCNC: 5 MMOL/L (ref 8–16)
AST SERPL-CCNC: 13 U/L (ref 10–40)
BILIRUB SERPL-MCNC: 0.5 MG/DL (ref 0.1–1)
BUN SERPL-MCNC: 9 MG/DL (ref 8–23)
CALCIUM SERPL-MCNC: 9.2 MG/DL (ref 8.7–10.5)
CHLORIDE SERPL-SCNC: 112 MMOL/L (ref 95–110)
CO2 SERPL-SCNC: 23 MMOL/L (ref 23–29)
CREAT SERPL-MCNC: 0.9 MG/DL (ref 0.5–1.4)
EST. GFR  (NO RACE VARIABLE): >60 ML/MIN/1.73 M^2
GLUCOSE SERPL-MCNC: 88 MG/DL (ref 70–110)
MAGNESIUM SERPL-MCNC: 1.7 MG/DL (ref 1.6–2.6)
PHOSPHATE SERPL-MCNC: 3.7 MG/DL (ref 2.7–4.5)
POTASSIUM SERPL-SCNC: 4.4 MMOL/L (ref 3.5–5.1)
PROT SERPL-MCNC: 5.5 G/DL (ref 6–8.4)
SODIUM SERPL-SCNC: 140 MMOL/L (ref 136–145)

## 2023-05-01 PROCEDURE — 94761 N-INVAS EAR/PLS OXIMETRY MLT: CPT

## 2023-05-01 PROCEDURE — 43251 EGD REMOVE LESION SNARE: CPT | Mod: ,,, | Performed by: INTERNAL MEDICINE

## 2023-05-01 PROCEDURE — 93010 EKG 12-LEAD: ICD-10-PCS | Mod: ,,, | Performed by: INTERNAL MEDICINE

## 2023-05-01 PROCEDURE — 84100 ASSAY OF PHOSPHORUS: CPT

## 2023-05-01 PROCEDURE — 27000221 HC OXYGEN, UP TO 24 HOURS

## 2023-05-01 PROCEDURE — 88305 TISSUE EXAM BY PATHOLOGIST: ICD-10-PCS | Mod: 26,,, | Performed by: PATHOLOGY

## 2023-05-01 PROCEDURE — D9220A PRA ANESTHESIA: ICD-10-PCS | Mod: ANES,,, | Performed by: ANESTHESIOLOGY

## 2023-05-01 PROCEDURE — 88305 TISSUE EXAM BY PATHOLOGIST: CPT | Performed by: PATHOLOGY

## 2023-05-01 PROCEDURE — 25000003 PHARM REV CODE 250: Performed by: NURSE ANESTHETIST, CERTIFIED REGISTERED

## 2023-05-01 PROCEDURE — 88305 TISSUE EXAM BY PATHOLOGIST: CPT | Mod: 26,,, | Performed by: PATHOLOGY

## 2023-05-01 PROCEDURE — 88342 CHG IMMUNOCYTOCHEMISTRY: ICD-10-PCS | Mod: 26,,, | Performed by: PATHOLOGY

## 2023-05-01 PROCEDURE — 43251 EGD REMOVE LESION SNARE: CPT | Performed by: INTERNAL MEDICINE

## 2023-05-01 PROCEDURE — 93005 ELECTROCARDIOGRAM TRACING: CPT

## 2023-05-01 PROCEDURE — 80053 COMPREHEN METABOLIC PANEL: CPT

## 2023-05-01 PROCEDURE — 27201089 HC SNARE, DISP (ANY): Performed by: INTERNAL MEDICINE

## 2023-05-01 PROCEDURE — D9220A PRA ANESTHESIA: Mod: CRNA,,, | Performed by: NURSE ANESTHETIST, CERTIFIED REGISTERED

## 2023-05-01 PROCEDURE — D9220A PRA ANESTHESIA: ICD-10-PCS | Mod: CRNA,,, | Performed by: NURSE ANESTHETIST, CERTIFIED REGISTERED

## 2023-05-01 PROCEDURE — 27200997: Performed by: INTERNAL MEDICINE

## 2023-05-01 PROCEDURE — 37000009 HC ANESTHESIA EA ADD 15 MINS: Performed by: INTERNAL MEDICINE

## 2023-05-01 PROCEDURE — 43251 PR EGD, FLEX, W/REMOVAL, TUMOR/POLYP/LESION(S), SNARE: ICD-10-PCS | Mod: ,,, | Performed by: INTERNAL MEDICINE

## 2023-05-01 PROCEDURE — 88342 IMHCHEM/IMCYTCHM 1ST ANTB: CPT | Performed by: PATHOLOGY

## 2023-05-01 PROCEDURE — D9220A PRA ANESTHESIA: Mod: ANES,,, | Performed by: ANESTHESIOLOGY

## 2023-05-01 PROCEDURE — 88342 IMHCHEM/IMCYTCHM 1ST ANTB: CPT | Mod: 26,,, | Performed by: PATHOLOGY

## 2023-05-01 PROCEDURE — 83735 ASSAY OF MAGNESIUM: CPT

## 2023-05-01 PROCEDURE — 37000008 HC ANESTHESIA 1ST 15 MINUTES: Performed by: INTERNAL MEDICINE

## 2023-05-01 PROCEDURE — 93010 ELECTROCARDIOGRAM REPORT: CPT | Mod: ,,, | Performed by: INTERNAL MEDICINE

## 2023-05-01 PROCEDURE — 63600175 PHARM REV CODE 636 W HCPCS: Performed by: NURSE ANESTHETIST, CERTIFIED REGISTERED

## 2023-05-01 RX ORDER — DIPHENHYDRAMINE HYDROCHLORIDE 50 MG/ML
25 INJECTION INTRAMUSCULAR; INTRAVENOUS EVERY 6 HOURS PRN
Status: DISCONTINUED | OUTPATIENT
Start: 2023-05-01 | End: 2023-05-01 | Stop reason: HOSPADM

## 2023-05-01 RX ORDER — PROPOFOL 10 MG/ML
VIAL (ML) INTRAVENOUS
Status: DISCONTINUED | OUTPATIENT
Start: 2023-05-01 | End: 2023-05-01

## 2023-05-01 RX ORDER — HYDROMORPHONE HYDROCHLORIDE 1 MG/ML
0.2 INJECTION, SOLUTION INTRAMUSCULAR; INTRAVENOUS; SUBCUTANEOUS EVERY 5 MIN PRN
Status: DISCONTINUED | OUTPATIENT
Start: 2023-05-01 | End: 2023-05-01 | Stop reason: HOSPADM

## 2023-05-01 RX ORDER — PHENYLEPHRINE HYDROCHLORIDE 10 MG/ML
INJECTION INTRAVENOUS
Status: DISCONTINUED | OUTPATIENT
Start: 2023-05-01 | End: 2023-05-01

## 2023-05-01 RX ORDER — LIDOCAINE HYDROCHLORIDE 20 MG/ML
INJECTION INTRAVENOUS
Status: DISCONTINUED | OUTPATIENT
Start: 2023-05-01 | End: 2023-05-01

## 2023-05-01 RX ORDER — ONDANSETRON 2 MG/ML
4 INJECTION INTRAMUSCULAR; INTRAVENOUS ONCE AS NEEDED
Status: DISCONTINUED | OUTPATIENT
Start: 2023-05-01 | End: 2023-05-01 | Stop reason: HOSPADM

## 2023-05-01 RX ORDER — PROPOFOL 10 MG/ML
VIAL (ML) INTRAVENOUS CONTINUOUS PRN
Status: DISCONTINUED | OUTPATIENT
Start: 2023-05-01 | End: 2023-05-01

## 2023-05-01 RX ORDER — ONDANSETRON 2 MG/ML
INJECTION INTRAMUSCULAR; INTRAVENOUS
Status: DISCONTINUED | OUTPATIENT
Start: 2023-05-01 | End: 2023-05-01

## 2023-05-01 RX ORDER — FENTANYL CITRATE 50 UG/ML
INJECTION, SOLUTION INTRAMUSCULAR; INTRAVENOUS
Status: DISCONTINUED | OUTPATIENT
Start: 2023-05-01 | End: 2023-05-01

## 2023-05-01 RX ORDER — SODIUM CHLORIDE 0.9 % (FLUSH) 0.9 %
10 SYRINGE (ML) INJECTION
Status: DISCONTINUED | OUTPATIENT
Start: 2023-05-01 | End: 2023-05-01 | Stop reason: HOSPADM

## 2023-05-01 RX ORDER — SODIUM CHLORIDE 9 MG/ML
INJECTION, SOLUTION INTRAVENOUS CONTINUOUS
Status: DISCONTINUED | OUTPATIENT
Start: 2023-05-01 | End: 2023-05-01 | Stop reason: HOSPADM

## 2023-05-01 RX ORDER — FENTANYL CITRATE 50 UG/ML
25 INJECTION, SOLUTION INTRAMUSCULAR; INTRAVENOUS EVERY 5 MIN PRN
Status: DISCONTINUED | OUTPATIENT
Start: 2023-05-01 | End: 2023-05-01 | Stop reason: HOSPADM

## 2023-05-01 RX ADMIN — PHENYLEPHRINE HYDROCHLORIDE 100 MCG: 10 INJECTION INTRAVENOUS at 10:05

## 2023-05-01 RX ADMIN — GLYCOPYRROLATE 0.1 MG: 0.2 INJECTION, SOLUTION INTRAMUSCULAR; INTRAVENOUS at 10:05

## 2023-05-01 RX ADMIN — LIDOCAINE HYDROCHLORIDE 100 MG: 20 INJECTION INTRAVENOUS at 10:05

## 2023-05-01 RX ADMIN — CALCIUM CHLORIDE 0.5 G: 100 INJECTION, SOLUTION INTRAVENOUS at 10:05

## 2023-05-01 RX ADMIN — PROPOFOL 80 MG: 10 INJECTION, EMULSION INTRAVENOUS at 10:05

## 2023-05-01 RX ADMIN — Medication 225 MCG/KG/MIN: at 10:05

## 2023-05-01 RX ADMIN — FENTANYL CITRATE 50 MCG: 50 INJECTION, SOLUTION INTRAMUSCULAR; INTRAVENOUS at 10:05

## 2023-05-01 RX ADMIN — SODIUM CHLORIDE: 0.9 INJECTION, SOLUTION INTRAVENOUS at 09:05

## 2023-05-01 NOTE — PROVATION PATIENT INSTRUCTIONS
Discharge Summary/Instructions after an Endoscopic Procedure  Patient Name: Adrian Healy  Patient MRN: [id number]  Patient YOB: 1940  Monday, May 1, 2023  Ajay Swain MD  Dear patient,  As a result of recent federal legislation (The Federal Cures Act), you may   receive lab or pathology results from your procedure in your [facility]   account before your physician is able to contact you. Your physician or   their representative will relay the results to you with their   recommendations at their soonest availability.  Thank you,  RESTRICTIONS:  During your procedure today, you received medications for sedation.  These   medications may affect your judgment, balance and coordination.  Therefore,   for 24 hours, you have the following restrictions:   - DO NOT drive a car, operate machinery, make legal/financial decisions,   sign important papers or drink alcohol.    ACTIVITY:  Today: no heavy lifting, straining or running due to procedural   sedation/anesthesia.  The following day: return to full activity including work.  DIET:  Eat and drink normally unless instructed otherwise.     TREATMENT FOR COMMON SIDE EFFECTS:  - Mild abdominal pain, nausea, belching, bloating or excessive gas:  rest,   eat lightly and use a heating pad.  - Sore Throat: treat with throat lozenges and/or gargle with warm salt   water.  - Because air was used during the procedure, expelling large amounts of air   from your rectum or belching is normal.  - If a bowel prep was taken, you may not have a bowel movement for 1-3 days.    This is normal.  SYMPTOMS TO WATCH FOR AND REPORT TO YOUR PHYSICIAN:  1. Abdominal pain or bloating, other than gas cramps.  2. Chest pain.  3. Back pain.  4. Signs of infection such as: chills or fever occurring within 24 hours   after the procedure.  5. Rectal bleeding, which would show as bright red, maroon, or black stools.   (A tablespoon of blood from the rectum is not serious, especially if    hemorrhoids are present.)  6. Vomiting.  7. Weakness or dizziness.  GO DIRECTLY TO THE NEAREST EMERGENCY ROOM IF YOU HAVE ANY OF THE FOLLOWING:      Difficulty breathing              Chills and/or fever over 101 F   Persistent vomiting and/or vomiting blood   Severe abdominal pain   Severe chest pain   Black, tarry stools   Bleeding- more than one tablespoon   Any other symptom or condition that you feel may need urgent attention  Your doctor recommends these additional instructions:  If any biopsies were taken, your doctors clinic will contact you in 1 to 2   weeks with any results.  - Discharge patient to home.   - Resume previous diet.   - Continue present medications.   - Await pathology results.   - Repeat upper endoscopy in 6 months for surveillance. Plan to remove   subsequent large polyps seen  For questions, problems or results please call your physician - Ajay Swain MD at Work:  [phone].  [facility], EMERGENCY ROOM PHONE NUMBER: [phone]  IF A COMPLICATION OR EMERGENCY SITUATION ARISES AND YOU ARE UNABLE TO REACH   YOUR PHYSICIAN - GO DIRECTLY TO THE EMERGENCY ROOM.  Ajay Swain MD  5/1/2023 10:38:20 AM  This report has been verified and signed electronically.  Dear patient,  As a result of recent federal legislation (The Federal Cures Act), you may   receive lab or pathology results from your procedure in your [facility]   account before your physician is able to contact you. Your physician or   their representative will relay the results to you with their   recommendations at their soonest availability.  Thank you,  PROVATION

## 2023-05-01 NOTE — TRANSFER OF CARE
"Anesthesia Transfer of Care Note    Patient: Adrian Healy    Procedure(s) Performed: Procedure(s) (LRB):  EGD (ESOPHAGOGASTRODUODENOSCOPY) (N/A)    Patient location: PACU    Anesthesia Type: general    Transport from OR: Transported from OR on 6-10 L/min O2 by face mask with adequate spontaneous ventilation    Post pain: adequate analgesia    Post assessment: no apparent anesthetic complications    Post vital signs: stable    Level of consciousness: awake    Nausea/Vomiting: no nausea/vomiting    Complications: none    Transfer of care protocol was followed      Last vitals:   Visit Vitals  BP 96/530 (BP Location: Left arm, Patient Position: Lying)   Pulse 66   Temp 36.7 °C (98.1 °F) (Temporal)   Resp 16   Ht 6' 1" (1.854 m)   Wt 99.8 kg (220 lb)   SpO2 97%   BMI 29.03 kg/m²     "

## 2023-05-01 NOTE — PLAN OF CARE
Inpatient consult to Cardiology  Consult performed by: Pete Tovar MD  Consult ordered by: Nicole Ramirez MD        Mr. Healy is an 82yoM with PMHx of HTN, GERD, HLD who presented for outpatient EGD to monitor for gastric polyps.  During procedure patient developed Afib rate controlled.  In PACU, patient asymptomatic.  VSS.  Denies cardiac or arrhythmia history, palpitations, chest pain, dyspnea, lightheadedness, or dizziness.       A/P:  New Onset Afib  - unclear etiology although possibly 2/2 stress during EGD/procedure.  Patient reports intermittent palpitations at home that resolve on own.  - Rate controlled and asymptomatic  - CHADsVASC 3, recommend anticoagulation with Eliquis 5 mg BID when able for reduction of stroke risk, patient with significant family history of strokes as well  - HASBLED 2  - Follow up in cardiology clinic  - Will sign off, please reach out with questions or concerns    Pete Tovar MD  [facility]    Agree with resident's assessment and plan. Will arrange for him to follow-up with us in cardiology clinic for further mgmt. Stable for DC home. Discussed with GI staff and nursing as well.     Rudy Pillai MD PGY5  Cardiovascular Medicine Fellow  [facility]  Pager: [phone]

## 2023-05-01 NOTE — ANESTHESIA PREPROCEDURE EVALUATION
05/01/2023  Adrian Healy is a 82 y.o., male.  Patient Active Problem List   Diagnosis    Hyperlipidemia    Hypertension    GERD (gastroesophageal reflux disease)    Hearing loss, sensorineural    Special screening for malignant neoplasms, colon    Anxiety    Jaw pain    Popping of both temporomandibular joints on opening of jaw           Pre-op Assessment          Review of Systems      Physical Exam    Airway:  No airway management difficulties anticipated  Dental:No active dental issues noted  Chest/Lungs:  Clear to auscultation    Heart:  Rate: Normal  Rhythm: Regular Rhythm  Sounds: Normal        Anesthesia Plan  Type of Anesthesia, risks & benefits discussed:    Anesthesia Type: Gen Natural Airway, Gen ETT  Informed Consent: Informed consent signed with the Patient and all parties understand the risks and agree with anesthesia plan.  All questions answered.   ASA Score: 3  Anesthesia Plan Notes: Chart reviewed. Patient seen and examined. Anesthesia plan discussed and questions answered. E-consent signed. Mao Sher MD    Ready For Surgery From Anesthesia Perspective.     .

## 2023-05-01 NOTE — ANESTHESIA POSTPROCEDURE EVALUATION
Anesthesia Post Evaluation    Patient: Adrian Healy    Procedure(s) Performed: Procedure(s) (LRB):  EGD (ESOPHAGOGASTRODUODENOSCOPY) (N/A)    Final Anesthesia Type: general      Patient location during evaluation: PACU  Level of consciousness: awake and alert  Pain management: adequate  Airway patency: patent      Anesthetic complications: yes  Perioperative Events: other periop events (see comments)      Zoraida-operative Events Comments: A fib during case. Probably paroxysmal. Cardiology consulted.   Cardiovascular status: stable  Respiratory status: room air  Hydration status: euvolemic  Follow-up not needed.          Vitals Value Taken Time   /73 05/01/23 1133   Temp 36.6 °C (97.9 °F) 05/01/23 1045   Pulse 73 05/01/23 1146   Resp 10 05/01/23 1146   SpO2 100 % 05/01/23 1146   Vitals shown include unvalidated device data.      No case tracking events are documented in the log.      Pain/Yuri Score: Yuri Score: 9 (5/1/2023 11:00 AM)

## 2023-05-01 NOTE — H&P
Short Stay Endoscopy History and Physical    PCP - Xander Will MD  Referring Physician - Wilmar Ordaz MD  [address] [place] [address]    Procedure - egd  ASA - per anesthesia  Mallampati - per anesthesia  History of Anesthesia problems - no  Family history Anesthesia problems -  no   Plan of anesthesia - General    HPI:  This is a 82 y.o. male here for evaluation of: gastric polyps    Reflux - no  Dysphagia - no  Abdominal pain - no  Diarrhea - no    ROS:  Constitutional: No fevers, chills, No weight loss  CV: No chest pain  Pulm: No cough, No shortness of breath  Ophtho: No vision changes  GI: see HPI  Derm: No rash    Medical History:  has a past medical history of GERD (gastroesophageal reflux disease), HEARING LOSS, Hyperlipidemia, and Hypertension.    Surgical History:  has a past surgical history that includes Eye surgery; Colonoscopy (N/A, 7/19/2018); Colonoscopy (N/A, 4/27/2022); and Esophagogastroduodenoscopy (N/A, 3/21/2023).    Family History: family history includes Cancer in his brother; Colon cancer in his brother; Heart disease in his brother; Liver cancer in his sister; Melanoma in his sister; Prostate cancer in his brother..    Social History:  reports that he quit smoking about 25 years ago. His smoking use included cigarettes. He has a 30.00 pack-year smoking history. He quit smokeless tobacco use about 25 years ago. He reports current alcohol use of about 5.8 standard drinks per week. He reports that he does not use drugs.    Review of patient's allergies indicates:  No Known Allergies    Medications:   Medications Prior to Admission   Medication Sig Dispense Refill Last Dose    amLODIPine (NORVASC) 5 MG tablet Take 1 tablet (5 mg total) by mouth once daily. 90 tablet 3 5/1/2023    atorvastatin (LIPITOR) 10 MG tablet Take 1 tablet (10 mg total) by mouth once daily. 90 tablet 3 4/30/2023    cetirizine (ZYRTEC) 5 MG tablet Take 1 tablet (5 mg total) by mouth once  daily. 90 tablet 3 4/30/2023    citalopram (CELEXA) 20 MG tablet Take 1 tablet (20 mg total) by mouth once daily. 90 tablet 3 4/30/2023    ferrous sulfate (FEOSOL) 325 mg (65 mg iron) Tab tablet Take 1 tablet (325 mg total) by mouth once daily. 30 tablet 3 4/30/2023    omeprazole (PRILOSEC) 40 MG capsule Take 1 capsule (40 mg total) by mouth once daily. 90 capsule 3 4/30/2023    hydrocortisone 2.5 % cream Apply topically 2 (two) times daily. 20 g 3 Unknown       Physical Exam:    Vital Signs:   Vitals:    05/01/23 0905   BP: 134/78   Pulse: 66   Resp: 16   Temp: 98.1 °F (36.7 °C)       General Appearance: Well appearing in no acute distress    Labs:  Lab Results   Component Value Date    WBC 5.43 02/27/2023    HGB 12.4 (L) 02/27/2023    HCT 40.0 02/27/2023     02/27/2023    CHOL 177 02/27/2023    TRIG 71 02/27/2023    HDL 58 02/27/2023    ALT 16 02/27/2023    AST 17 02/27/2023     02/27/2023    K 4.6 02/27/2023     02/27/2023    CREATININE 1.0 02/27/2023    BUN 11 02/27/2023    CO2 24 02/27/2023    TSH 0.939 02/27/2023    PSA 0.35 02/27/2023    HGBA1C 5.7 (H) 02/27/2023       I have explained the risks and benefits of this endoscopic procedure to the patient including but not limited to bleeding, inflammation, infection, perforation, and death.      Ajay Swain MD

## 2023-05-23 ENCOUNTER — OFFICE VISIT (OUTPATIENT)
Dept: CARDIOLOGY | Facility: CLINIC | Age: 83
End: 2023-05-23
Payer: MEDICARE

## 2023-05-23 VITALS
SYSTOLIC BLOOD PRESSURE: 128 MMHG | HEART RATE: 79 BPM | OXYGEN SATURATION: 98 % | WEIGHT: 218.94 LBS | HEIGHT: 73 IN | DIASTOLIC BLOOD PRESSURE: 75 MMHG | BODY MASS INDEX: 29.02 KG/M2

## 2023-05-23 DIAGNOSIS — I48.0 PAROXYSMAL ATRIAL FIBRILLATION: Primary | ICD-10-CM

## 2023-05-23 DIAGNOSIS — E78.5 HYPERLIPIDEMIA, UNSPECIFIED HYPERLIPIDEMIA TYPE: ICD-10-CM

## 2023-05-23 DIAGNOSIS — I48.19 PERSISTENT ATRIAL FIBRILLATION: ICD-10-CM

## 2023-05-23 DIAGNOSIS — I10 PRIMARY HYPERTENSION: ICD-10-CM

## 2023-05-23 PROCEDURE — 99999 PR PBB SHADOW E&M-EST. PATIENT-LVL V: CPT | Mod: PBBFAC,GC,, | Performed by: INTERNAL MEDICINE

## 2023-05-23 PROCEDURE — 93005 ELECTROCARDIOGRAM TRACING: CPT | Mod: PBBFAC | Performed by: INTERNAL MEDICINE

## 2023-05-23 PROCEDURE — 99999 PR PBB SHADOW E&M-EST. PATIENT-LVL V: ICD-10-PCS | Mod: PBBFAC,GC,, | Performed by: INTERNAL MEDICINE

## 2023-05-23 PROCEDURE — 99204 PR OFFICE/OUTPT VISIT, NEW, LEVL IV, 45-59 MIN: ICD-10-PCS | Mod: 25,S$PBB,GC, | Performed by: INTERNAL MEDICINE

## 2023-05-23 PROCEDURE — 99215 OFFICE O/P EST HI 40 MIN: CPT | Mod: PBBFAC | Performed by: INTERNAL MEDICINE

## 2023-05-23 PROCEDURE — 93010 ELECTROCARDIOGRAM REPORT: CPT | Mod: S$PBB,,, | Performed by: INTERNAL MEDICINE

## 2023-05-23 PROCEDURE — 93010 EKG 12-LEAD: ICD-10-PCS | Mod: S$PBB,,, | Performed by: INTERNAL MEDICINE

## 2023-05-23 PROCEDURE — 99204 OFFICE O/P NEW MOD 45 MIN: CPT | Mod: 25,S$PBB,GC, | Performed by: INTERNAL MEDICINE

## 2023-05-23 NOTE — PROGRESS NOTES
Cardiology Clinic Note  Reason for Visit: New onset AFib  Referring Physician: Vivienne Gonzalez    HPI:   Mr. Healy is an 82yoM with PMHx of HTN, GERD, HLD who presented for outpatient EGD to monitor for gastric polyps.  During procedure patient developed Afib rate controlled.  In PACU, patient asymptomatic.  VSS.  Denies cardiac or arrhythmia history, palpitations, chest pain, dyspnea, lightheadedness, or dizziness. Discharged with outpt cardiology follow-up.    Today, he is accompanied by his wife and feeling well. No palpitations since his EGD. No bleeding on Eliquis. No heart failure or angina symptoms. Fatigue has been decreased over the past few years since COVID, but not recently. Not very active, and hasn't used his stationary bike much lately.     ROS:    Constitution: Negative for fever, chills, weight loss or gain.   HENT: Negative for sore throat, rhinorrhea, or headache.  Eyes: Negative for blurred or double vision.   Cardiovascular: See above  Pulmonary: Negative for SOB   Gastrointestinal: Negative for abdominal pain, nausea, vomiting, or diarrhea.   : Negative for dysuria.   Neurological: Negative for focal weakness or sensory changes.  PMH:     Past Medical History:   Diagnosis Date    GERD (gastroesophageal reflux disease)     HEARING LOSS     Hyperlipidemia     Hypertension      Past Surgical History:   Procedure Laterality Date    COLONOSCOPY N/A 7/19/2018    Procedure: COLONOSCOPY;  Surgeon: bAilio Ferrara MD;  Location: [address]);  Service: Endoscopy;  Laterality: N/A;    COLONOSCOPY N/A 4/27/2022    Procedure: COLONOSCOPY;  Surgeon: Rudy Grace MD;  Location: [address]);  Service: Endoscopy;  Laterality: N/A;  fully vaccinated    ESOPHAGOGASTRODUODENOSCOPY N/A 3/21/2023    Procedure: ESOPHAGOGASTRODUODENOSCOPY (EGD);  Surgeon: Wilmar Ordaz MD;  Location: [facility] ([address] [place] [address]);  Service: Endoscopy;  Laterality: N/A;  inst portal-tb     ESOPHAGOGASTRODUODENOSCOPY N/A 2023    Procedure: EGD (ESOPHAGOGASTRODUODENOSCOPY);  Surgeon: Ajay Swain MD;  Location: [facility] ([address] [place] [address]);  Service: Endoscopy;  Laterality: N/A;  23-Instructions via portal-DS   pre call no answer    EYE SURGERY       Allergies:   Review of patient's allergies indicates:  No Known Allergies  Medications:     Current Outpatient Medications on File Prior to Visit   Medication Sig Dispense Refill    amLODIPine (NORVASC) 5 MG tablet Take 1 tablet (5 mg total) by mouth once daily. 90 tablet 3    apixaban (ELIQUIS) 5 mg Tab Take 1 tablet (5 mg total) by mouth 2 (two) times daily. 60 tablet 1    atorvastatin (LIPITOR) 10 MG tablet Take 1 tablet (10 mg total) by mouth once daily. 90 tablet 3    cetirizine (ZYRTEC) 5 MG tablet Take 1 tablet (5 mg total) by mouth once daily. 90 tablet 3    citalopram (CELEXA) 20 MG tablet Take 1 tablet (20 mg total) by mouth once daily. 90 tablet 3    ferrous sulfate (FEOSOL) 325 mg (65 mg iron) Tab tablet Take 1 tablet (325 mg total) by mouth once daily. 30 tablet 3    hydrocortisone 2.5 % cream Apply topically 2 (two) times daily. 20 g 3    omeprazole (PRILOSEC) 40 MG capsule Take 1 capsule (40 mg total) by mouth once daily. 90 capsule 3     No current facility-administered medications on file prior to visit.     Social History:     Social History     Tobacco Use    Smoking status: Former     Packs/day: 1.00     Years: 30.00     Pack years: 30.00     Types: Cigarettes     Quit date: 1997     Years since quittin.4    Smokeless tobacco: Former     Quit date: 12/10/1997   Substance Use Topics    Alcohol use: Yes     Alcohol/week: 5.8 standard drinks     Types: 7 Standard drinks or equivalent per week     Comment: rum/coke 3-4 daily     Family History:     Family History   Problem Relation Age of Onset    Liver cancer Sister     Melanoma Sister     Cancer Brother     Heart disease Brother         pacemaker    Colon cancer Brother   "   Prostate cancer Brother      Physical Exam:   /75 (BP Location: Left arm, Patient Position: Sitting, BP Method: Large (Automatic))   Pulse 79   Ht 6' 1" (1.854 m)   Wt 99.3 kg (218 lb 14.7 oz)   SpO2 98%   BMI 28.88 kg/m²        Physical Exam   Constitutional: He is oriented to person, place, and time.   HENT:   Head: Normocephalic.   Mouth/Throat: Mucous membranes are moist.   Cardiovascular: Normal rate. An irregular rhythm present.   Pulmonary/Chest: Effort normal.   Abdominal: Soft. Normal appearance. He exhibits no distension. There is no abdominal tenderness.   Musculoskeletal:      Cervical back: Neck supple.      Right lower leg: No edema.      Left lower leg: No edema.   Neurological: He is alert and oriented to person, place, and time.   Skin: Skin is warm.      Labs:     Lab Results   Component Value Date     05/01/2023    K 4.4 05/01/2023     (H) 05/01/2023    CO2 23 05/01/2023    BUN 9 05/01/2023    CREATININE 0.9 05/01/2023    ANIONGAP 5 (L) 05/01/2023     Lab Results   Component Value Date    HGBA1C 5.7 (H) 02/27/2023     No results found for: BNP, BNPTRIAGEBLO Lab Results   Component Value Date    WBC 5.43 02/27/2023    HGB 12.4 (L) 02/27/2023    HCT 40.0 02/27/2023     02/27/2023    GRAN 3.3 02/27/2023    GRAN 61.5 02/27/2023     Lab Results   Component Value Date    CHOL 177 02/27/2023    HDL 58 02/27/2023    LDLCALC 104.8 02/27/2023    TRIG 71 02/27/2023          Imaging:     EKG:  Atrial fibrillation with PVCs  Assessment:     1. Persistent Atrial Fibrillation  IN OFFICE EKG 12-LEAD (to [place])    Echo    Ambulatory referral/consult to Sleep Disorders    Ambulatory referral/consult to Electrophysiology      2. Hyperlipidemia, unspecified hyperlipidemia type        3. Primary hypertension               Plan:   Persistent Atrial Fibrillation  New onset after EGD earlier this month; rate-controlled and asymptomatic other than fatigue for the past few years  [id number] " 3, HASBLED 2  Continue Eliquis 5 mg BID  TTE  Referral to sleep medicine for JACOBO evaluation  Referral to EP for potential DCCV    Hyperlipidemia  Continue Atorvastatin;     Essential Hypertension  Controlled  Continue amlodipine 10 mg daily    RTC 6 months or sooner PRN.    Discussed case with Dr Jessica Pascual MD.    Rudy Pillai MD PGY5  Cardiovascular Medicine Fellow  [facility]  Pager: [phone]

## 2023-05-29 DIAGNOSIS — I49.8 OTHER CARDIAC ARRHYTHMIA: Primary | ICD-10-CM

## 2023-05-30 LAB
FINAL PATHOLOGIC DIAGNOSIS: NORMAL
GROSS: NORMAL
Lab: NORMAL
SUPPLEMENTAL DIAGNOSIS: NORMAL

## 2023-06-01 NOTE — PROGRESS NOTES
"Electrophysiology Clinic Note    Reason for new patient visit: Evaluation and recommendations regarding a new diagnosis of persistent atrial fibrillation.     PRESENTING HISTORY:     History of Present Illness:  Mr. Adrian Healy is a alicia 82-year-old gentleman who presents today for evaluation and recommendations regarding a new diagnosis of persistent atrial fibrillation. He has a past medical history significant for a new diagnosis of persistent atrial fibrillation noted during an outpatient EGD, GERD with a history of gastric polyps, hypertension, hyperlipidemia, hearing loss, and overweight BMI.     In brief review of his recent atrial fibrillation history, he presented for an outpatient EGD to monitor his gastric polyps on 5/1/2023. Per chart review, he was noted to be in rate-controlled atrial fibrillation. He was not told that he was in atrial fibrillation at his prior colonoscopy about one month prior in April 2023; however, he does not think they obtained a 12-lead ECG. While in the PACU, he reported that he was asymptomatic. He was subsequently seen in general cardiology with Naye Pillai and Samara on 5/23/2023. At that encounter, he remained in rate-controlled atrial fibrillation. He was started on oral anticoagulation with apixaban and denies any adverse bleeding events.      He presents to clinic today with his wife. In discussion with Mr. Healy today, he tells me that he is feeling overall quite well. He denies any episodes of dizziness, lightheadedness, syncope/presyncope, chest pain or chest discomfort, nausea or vomiting, orthopnea, or PND. He reports that he feels mild exercise intolerance; when he was recently mowing his lawn, he noted that he had to take frequent breaks, which is a new symptom. He reports very brief "chest flutters" that last for a matter of seconds. He denies olive palpitations. He believes his baseline shortness of breath may be worse while in atrial fibrillation. He can " climb more than one flight of stairs prior to needing to take a break, and usually is able to ride his stationary bicycle for several miles without limitation.     Review of Systems:  Review of Systems   Constitutional:  Positive for fatigue. Negative for activity change.        Mild exercise intolerance while in atrial fibrillation.    HENT:  Negative for nasal congestion, nosebleeds, postnasal drip, rhinorrhea, sinus pressure/congestion, sneezing and sore throat.    Respiratory:  Positive for shortness of breath. Negative for apnea, cough, chest tightness and wheezing.    Cardiovascular:  Negative for chest pain, palpitations and leg swelling.   Gastrointestinal:  Negative for abdominal distention, abdominal pain, blood in stool, change in bowel habit, constipation, diarrhea, nausea, vomiting and change in bowel habit.   Genitourinary:  Negative for dysuria and hematuria.   Musculoskeletal:  Positive for arthralgias and back pain. Negative for gait problem.   Neurological:  Negative for dizziness, seizures, syncope, weakness, light-headedness, headaches, coordination difficulties and coordination difficulties.      PAST HISTORY:     Past Medical History:   Diagnosis Date    GERD (gastroesophageal reflux disease)     HEARING LOSS     Hyperlipidemia     Hypertension        Past Surgical History:   Procedure Laterality Date    COLONOSCOPY N/A 7/19/2018    Procedure: COLONOSCOPY;  Surgeon: Abilio Ferrara MD;  Location: [address]);  Service: Endoscopy;  Laterality: N/A;    COLONOSCOPY N/A 4/27/2022    Procedure: COLONOSCOPY;  Surgeon: Rudy Grace MD;  Location: [address]);  Service: Endoscopy;  Laterality: N/A;  fully vaccinated    ESOPHAGOGASTRODUODENOSCOPY N/A 3/21/2023    Procedure: ESOPHAGOGASTRODUODENOSCOPY (EGD);  Surgeon: Wilmar Ordaz MD;  Location: [address]);  Service: Endoscopy;  Laterality: N/A;  inst portal-tb    ESOPHAGOGASTRODUODENOSCOPY N/A 5/1/2023     "Procedure: EGD (ESOPHAGOGASTRODUODENOSCOPY);  Surgeon: Ajay Swain MD;  Location: [facility] ([address] [place] [address]);  Service: Endoscopy;  Laterality: N/A;  4/13/23-Instructions via portal-DS  4/25 pre call no answer    EYE SURGERY         Family History:  Family History   Problem Relation Age of Onset    Liver cancer Sister     Melanoma Sister     Cancer Brother     Heart disease Brother         pacemaker    Colon cancer Brother     Prostate cancer Brother        Social History:  He  reports that he quit smoking about 25 years ago. His smoking use included cigarettes. He has a 30.00 pack-year smoking history. He quit smokeless tobacco use about 25 years ago. He reports current alcohol use of about 5.8 standard drinks per week. He reports that he does not use drugs.      MEDICATIONS & ALLERGIES:     Review of patient's allergies indicates:  No Known Allergies    Current Outpatient Medications on File Prior to Visit   Medication Sig Dispense Refill    amLODIPine (NORVASC) 5 MG tablet Take 1 tablet (5 mg total) by mouth once daily. 90 tablet 3    apixaban (ELIQUIS) 5 mg Tab Take 1 tablet (5 mg total) by mouth 2 (two) times daily. 60 tablet 1    atorvastatin (LIPITOR) 10 MG tablet Take 1 tablet (10 mg total) by mouth once daily. 90 tablet 3    cetirizine (ZYRTEC) 5 MG tablet Take 1 tablet (5 mg total) by mouth once daily. 90 tablet 3    citalopram (CELEXA) 20 MG tablet Take 1 tablet (20 mg total) by mouth once daily. 90 tablet 3    ferrous sulfate (FEOSOL) 325 mg (65 mg iron) Tab tablet Take 1 tablet (325 mg total) by mouth once daily. 30 tablet 3    hydrocortisone 2.5 % cream Apply topically 2 (two) times daily. 20 g 3    omeprazole (PRILOSEC) 40 MG capsule Take 1 capsule (40 mg total) by mouth once daily. 90 capsule 3     No current facility-administered medications on file prior to visit.        OBJECTIVE:     Vital Signs:  /73   Pulse 101   Ht 6' 1" (1.854 m)   Wt 99.1 kg (218 lb 7.6 oz)   BMI 28.82 kg/m² "     Physical Exam:  Physical Exam  Constitutional:       General: He is not in acute distress.     Appearance: Normal appearance. He is not ill-appearing or diaphoretic.      Comments: Well-appearing man in NAD.   HENT:      Head: Normocephalic and atraumatic.      Nose: Nose normal.      Mouth/Throat:      Mouth: Mucous membranes are moist.      Pharynx: Oropharynx is clear.   Eyes:      Pupils: Pupils are equal, round, and reactive to light.   Cardiovascular:      Rate and Rhythm: Tachycardia present. Rhythm irregular.      Pulses: Normal pulses.      Heart sounds: Normal heart sounds. No murmur heard.    No friction rub. No gallop.      Comments: Irregularly irregular rhythm on right radial artery palpation.   Pulmonary:      Effort: Pulmonary effort is normal. No respiratory distress.      Breath sounds: Normal breath sounds. No wheezing, rhonchi or rales.   Chest:      Chest wall: No tenderness.   Abdominal:      General: There is no distension.      Palpations: Abdomen is soft.      Tenderness: There is no abdominal tenderness.   Musculoskeletal:         General: No swelling or tenderness.      Cervical back: Normal range of motion.      Right lower leg: No edema.      Left lower leg: No edema.   Skin:     General: Skin is warm and dry.      Findings: No erythema, lesion or rash.   Neurological:      General: No focal deficit present.      Mental Status: He is alert and oriented to person, place, and time. Mental status is at baseline.      Motor: No weakness.      Gait: Gait normal.   Psychiatric:         Mood and Affect: Mood normal.         Behavior: Behavior normal.        Laboratory Data:  Lab Results   Component Value Date    WBC 5.43 02/27/2023    HGB 12.4 (L) 02/27/2023    HCT 40.0 02/27/2023    MCV 91 02/27/2023     02/27/2023     Lab Results   Component Value Date    GLU 88 05/01/2023     05/01/2023    K 4.4 05/01/2023     (H) 05/01/2023    CO2 23 05/01/2023    BUN 9 05/01/2023     CREATININE 0.9 05/01/2023    CALCIUM 9.2 05/01/2023    MG 1.7 05/01/2023     No results found for: INR, PROTIME    Pertinent Cardiac Data:  ECG: Atrial fibrillation with RVR, ventricular rate of 101 bpm, occasional aberrantly conducted beats, QRS 90 ms, QT/QTc 346/448 ms, nonspecific STT changes.       ASSESSMENT & PLAN:   Mr. Adrian Healy is a alicia 82-year-old gentleman who presents today for evaluation and recommendations regarding a new diagnosis of persistent atrial fibrillation. He has a past medical history significant for a new diagnosis of persistent atrial fibrillation noted during an outpatient EGD, GERD with a history of gastric polyps, hypertension, hyperlipidemia, hearing loss, and overweight BMI.     - We discussed the pathophysiology of atrial fibrillation; specifically, we discussed paroxysmal atrial fibrillation as it differs from persistent atrial fibrillation. We discussed that the longer a patient remains in atrial fibrillation, the more challenging it may be for the patient to return and sustain sinus rhythm. He feels that he has likely been in sustained, persistent atrial fibrillation since at least May, 2023, but may have been in this rhythm longer. We discussed that atrial fibrillation has an increased risk of CVA.    - He is presently in persistent atrial fibrillation with RVR. He denies overt palpations, but reports slightly worsened shortness of breath and exercise intolerance. We discussed performing a SOILA/cardioversion for an attempt at restoration of sinus rhythm. This procedure was described in detail, in addition to potential risks, benefits, and alternative options. Mr. Healy voices understanding and is in agreement. Informed consent was obtained in clinic today.  - We will plan on starting antiarrhythmic therapy following his cardioversion. He has no history of underlying coronary artery disease, although this has not been formally assessed. His baseline QRSd remains narrow at  90ms. He has preserved renal function. A resting echocardiogram has been ordered, but has yet to be obtained. We will plan to evaluate his systolic function and assess his rates following cardioversion prior to decision on antiarrhythmic selection.   - His [id number] is 3 (HTN, male gender, age greater than 75), portending an annual adjusted risk of CVA of 3.2%. He remains on uninterrupted apixaban 5 mg po BID with no adverse bleeding events reported.   - Possible underlying drivers of atrial fibrillation were addressed at this appointment, including recommendations for maintenance of a healthy BMI - now a class I recommendation. Review of laboratory data reveals acceptable TSH at 0.939. We discussed the role that obstructive sleep apnea may play in atrial fibrillation. A sleep study may be considered at a future encounter to evaluate for possible JACOBO.       This patient will present to the EP laboratory to undergo a SOILA/cardioversion with antiarrhythmic initiation and continued apixaban therapy. All questions and concerns were addressed at this encounter.     Signing Physician:       PAULINE Lagunas MD  Electrophysiology Attending

## 2023-06-01 NOTE — H&P (VIEW-ONLY)
"Electrophysiology Clinic Note    Reason for new patient visit: Evaluation and recommendations regarding a new diagnosis of persistent atrial fibrillation.     PRESENTING HISTORY:     History of Present Illness:  Mr. Adrian Healy is a alicia 82-year-old gentleman who presents today for evaluation and recommendations regarding a new diagnosis of persistent atrial fibrillation. He has a past medical history significant for a new diagnosis of persistent atrial fibrillation noted during an outpatient EGD, GERD with a history of gastric polyps, hypertension, hyperlipidemia, hearing loss, and overweight BMI.     In brief review of his recent atrial fibrillation history, he presented for an outpatient EGD to monitor his gastric polyps on 5/1/2023. Per chart review, he was noted to be in rate-controlled atrial fibrillation. He was not told that he was in atrial fibrillation at his prior colonoscopy about one month prior in April 2023; however, he does not think they obtained a 12-lead ECG. While in the PACU, he reported that he was asymptomatic. He was subsequently seen in general cardiology with Naye Pillai and Samara on 5/23/2023. At that encounter, he remained in rate-controlled atrial fibrillation. He was started on oral anticoagulation with apixaban and denies any adverse bleeding events.      He presents to clinic today with his wife. In discussion with Mr. Healy today, he tells me that he is feeling overall quite well. He denies any episodes of dizziness, lightheadedness, syncope/presyncope, chest pain or chest discomfort, nausea or vomiting, orthopnea, or PND. He reports that he feels mild exercise intolerance; when he was recently mowing his lawn, he noted that he had to take frequent breaks, which is a new symptom. He reports very brief "chest flutters" that last for a matter of seconds. He denies olive palpitations. He believes his baseline shortness of breath may be worse while in atrial fibrillation. He can " climb more than one flight of stairs prior to needing to take a break, and usually is able to ride his stationary bicycle for several miles without limitation.     Review of Systems:  Review of Systems   Constitutional:  Positive for fatigue. Negative for activity change.        Mild exercise intolerance while in atrial fibrillation.    HENT:  Negative for nasal congestion, nosebleeds, postnasal drip, rhinorrhea, sinus pressure/congestion, sneezing and sore throat.    Respiratory:  Positive for shortness of breath. Negative for apnea, cough, chest tightness and wheezing.    Cardiovascular:  Negative for chest pain, palpitations and leg swelling.   Gastrointestinal:  Negative for abdominal distention, abdominal pain, blood in stool, change in bowel habit, constipation, diarrhea, nausea, vomiting and change in bowel habit.   Genitourinary:  Negative for dysuria and hematuria.   Musculoskeletal:  Positive for arthralgias and back pain. Negative for gait problem.   Neurological:  Negative for dizziness, seizures, syncope, weakness, light-headedness, headaches, coordination difficulties and coordination difficulties.      PAST HISTORY:     Past Medical History:   Diagnosis Date    GERD (gastroesophageal reflux disease)     HEARING LOSS     Hyperlipidemia     Hypertension        Past Surgical History:   Procedure Laterality Date    COLONOSCOPY N/A 7/19/2018    Procedure: COLONOSCOPY;  Surgeon: Abilio Ferrara MD;  Location: [address]);  Service: Endoscopy;  Laterality: N/A;    COLONOSCOPY N/A 4/27/2022    Procedure: COLONOSCOPY;  Surgeon: Rudy Grace MD;  Location: [address]);  Service: Endoscopy;  Laterality: N/A;  fully vaccinated    ESOPHAGOGASTRODUODENOSCOPY N/A 3/21/2023    Procedure: ESOPHAGOGASTRODUODENOSCOPY (EGD);  Surgeon: Wilmar Ordaz MD;  Location: [address]);  Service: Endoscopy;  Laterality: N/A;  inst portal-tb    ESOPHAGOGASTRODUODENOSCOPY N/A 5/1/2023     "Procedure: EGD (ESOPHAGOGASTRODUODENOSCOPY);  Surgeon: Ajay Swain MD;  Location: [facility] ([address] [place] [address]);  Service: Endoscopy;  Laterality: N/A;  4/13/23-Instructions via portal-DS  4/25 pre call no answer    EYE SURGERY         Family History:  Family History   Problem Relation Age of Onset    Liver cancer Sister     Melanoma Sister     Cancer Brother     Heart disease Brother         pacemaker    Colon cancer Brother     Prostate cancer Brother        Social History:  He  reports that he quit smoking about 25 years ago. His smoking use included cigarettes. He has a 30.00 pack-year smoking history. He quit smokeless tobacco use about 25 years ago. He reports current alcohol use of about 5.8 standard drinks per week. He reports that he does not use drugs.      MEDICATIONS & ALLERGIES:     Review of patient's allergies indicates:  No Known Allergies    Current Outpatient Medications on File Prior to Visit   Medication Sig Dispense Refill    amLODIPine (NORVASC) 5 MG tablet Take 1 tablet (5 mg total) by mouth once daily. 90 tablet 3    apixaban (ELIQUIS) 5 mg Tab Take 1 tablet (5 mg total) by mouth 2 (two) times daily. 60 tablet 1    atorvastatin (LIPITOR) 10 MG tablet Take 1 tablet (10 mg total) by mouth once daily. 90 tablet 3    cetirizine (ZYRTEC) 5 MG tablet Take 1 tablet (5 mg total) by mouth once daily. 90 tablet 3    citalopram (CELEXA) 20 MG tablet Take 1 tablet (20 mg total) by mouth once daily. 90 tablet 3    ferrous sulfate (FEOSOL) 325 mg (65 mg iron) Tab tablet Take 1 tablet (325 mg total) by mouth once daily. 30 tablet 3    hydrocortisone 2.5 % cream Apply topically 2 (two) times daily. 20 g 3    omeprazole (PRILOSEC) 40 MG capsule Take 1 capsule (40 mg total) by mouth once daily. 90 capsule 3     No current facility-administered medications on file prior to visit.        OBJECTIVE:     Vital Signs:  /73   Pulse 101   Ht 6' 1" (1.854 m)   Wt 99.1 kg (218 lb 7.6 oz)   BMI 28.82 kg/m² "     Physical Exam:  Physical Exam  Constitutional:       General: He is not in acute distress.     Appearance: Normal appearance. He is not ill-appearing or diaphoretic.      Comments: Well-appearing man in NAD.   HENT:      Head: Normocephalic and atraumatic.      Nose: Nose normal.      Mouth/Throat:      Mouth: Mucous membranes are moist.      Pharynx: Oropharynx is clear.   Eyes:      Pupils: Pupils are equal, round, and reactive to light.   Cardiovascular:      Rate and Rhythm: Tachycardia present. Rhythm irregular.      Pulses: Normal pulses.      Heart sounds: Normal heart sounds. No murmur heard.    No friction rub. No gallop.      Comments: Irregularly irregular rhythm on right radial artery palpation.   Pulmonary:      Effort: Pulmonary effort is normal. No respiratory distress.      Breath sounds: Normal breath sounds. No wheezing, rhonchi or rales.   Chest:      Chest wall: No tenderness.   Abdominal:      General: There is no distension.      Palpations: Abdomen is soft.      Tenderness: There is no abdominal tenderness.   Musculoskeletal:         General: No swelling or tenderness.      Cervical back: Normal range of motion.      Right lower leg: No edema.      Left lower leg: No edema.   Skin:     General: Skin is warm and dry.      Findings: No erythema, lesion or rash.   Neurological:      General: No focal deficit present.      Mental Status: He is alert and oriented to person, place, and time. Mental status is at baseline.      Motor: No weakness.      Gait: Gait normal.   Psychiatric:         Mood and Affect: Mood normal.         Behavior: Behavior normal.        Laboratory Data:  Lab Results   Component Value Date    WBC 5.43 02/27/2023    HGB 12.4 (L) 02/27/2023    HCT 40.0 02/27/2023    MCV 91 02/27/2023     02/27/2023     Lab Results   Component Value Date    GLU 88 05/01/2023     05/01/2023    K 4.4 05/01/2023     (H) 05/01/2023    CO2 23 05/01/2023    BUN 9 05/01/2023     CREATININE 0.9 05/01/2023    CALCIUM 9.2 05/01/2023    MG 1.7 05/01/2023     No results found for: INR, PROTIME    Pertinent Cardiac Data:  ECG: Atrial fibrillation with RVR, ventricular rate of 101 bpm, occasional aberrantly conducted beats, QRS 90 ms, QT/QTc 346/448 ms, nonspecific STT changes.       ASSESSMENT & PLAN:   Mr. Adrian Healy is a alicia 82-year-old gentleman who presents today for evaluation and recommendations regarding a new diagnosis of persistent atrial fibrillation. He has a past medical history significant for a new diagnosis of persistent atrial fibrillation noted during an outpatient EGD, GERD with a history of gastric polyps, hypertension, hyperlipidemia, hearing loss, and overweight BMI.     - We discussed the pathophysiology of atrial fibrillation; specifically, we discussed paroxysmal atrial fibrillation as it differs from persistent atrial fibrillation. We discussed that the longer a patient remains in atrial fibrillation, the more challenging it may be for the patient to return and sustain sinus rhythm. He feels that he has likely been in sustained, persistent atrial fibrillation since at least May, 2023, but may have been in this rhythm longer. We discussed that atrial fibrillation has an increased risk of CVA.    - He is presently in persistent atrial fibrillation with RVR. He denies overt palpations, but reports slightly worsened shortness of breath and exercise intolerance. We discussed performing a SOILA/cardioversion for an attempt at restoration of sinus rhythm. This procedure was described in detail, in addition to potential risks, benefits, and alternative options. Mr. Healy voices understanding and is in agreement. Informed consent was obtained in clinic today.  - We will plan on starting antiarrhythmic therapy following his cardioversion. He has no history of underlying coronary artery disease, although this has not been formally assessed. His baseline QRSd remains narrow at  90ms. He has preserved renal function. A resting echocardiogram has been ordered, but has yet to be obtained. We will plan to evaluate his systolic function and assess his rates following cardioversion prior to decision on antiarrhythmic selection.   - His [id number] is 3 (HTN, male gender, age greater than 75), portending an annual adjusted risk of CVA of 3.2%. He remains on uninterrupted apixaban 5 mg po BID with no adverse bleeding events reported.   - Possible underlying drivers of atrial fibrillation were addressed at this appointment, including recommendations for maintenance of a healthy BMI - now a class I recommendation. Review of laboratory data reveals acceptable TSH at 0.939. We discussed the role that obstructive sleep apnea may play in atrial fibrillation. A sleep study may be considered at a future encounter to evaluate for possible JACOBO.       This patient will present to the EP laboratory to undergo a SOILA/cardioversion with antiarrhythmic initiation and continued apixaban therapy. All questions and concerns were addressed at this encounter.     Signing Physician:       PAULINE Lagunas MD  Electrophysiology Attending

## 2023-06-02 ENCOUNTER — OFFICE VISIT (OUTPATIENT)
Dept: ELECTROPHYSIOLOGY | Facility: CLINIC | Age: 83
End: 2023-06-02
Payer: MEDICARE

## 2023-06-02 ENCOUNTER — HOSPITAL ENCOUNTER (OUTPATIENT)
Dept: CARDIOLOGY | Facility: CLINIC | Age: 83
Discharge: HOME | End: 2023-06-02
Payer: MEDICARE

## 2023-06-02 VITALS
DIASTOLIC BLOOD PRESSURE: 73 MMHG | BODY MASS INDEX: 28.96 KG/M2 | SYSTOLIC BLOOD PRESSURE: 130 MMHG | HEIGHT: 73 IN | HEART RATE: 101 BPM | WEIGHT: 218.5 LBS

## 2023-06-02 DIAGNOSIS — F41.9 ANXIETY: ICD-10-CM

## 2023-06-02 DIAGNOSIS — H90.3 SENSORINEURAL HEARING LOSS (SNHL) OF BOTH EARS: ICD-10-CM

## 2023-06-02 DIAGNOSIS — E66.3 OVERWEIGHT (BMI 25.0-29.9): ICD-10-CM

## 2023-06-02 DIAGNOSIS — E78.2 MIXED HYPERLIPIDEMIA: ICD-10-CM

## 2023-06-02 DIAGNOSIS — I49.8 OTHER CARDIAC ARRHYTHMIA: ICD-10-CM

## 2023-06-02 DIAGNOSIS — K21.9 GASTROESOPHAGEAL REFLUX DISEASE, UNSPECIFIED WHETHER ESOPHAGITIS PRESENT: ICD-10-CM

## 2023-06-02 DIAGNOSIS — I48.0 PAROXYSMAL ATRIAL FIBRILLATION: Primary | ICD-10-CM

## 2023-06-02 DIAGNOSIS — I10 PRIMARY HYPERTENSION: ICD-10-CM

## 2023-06-02 PROCEDURE — 99213 OFFICE O/P EST LOW 20 MIN: CPT | Mod: PBBFAC | Performed by: STUDENT IN AN ORGANIZED HEALTH CARE EDUCATION/TRAINING PROGRAM

## 2023-06-02 PROCEDURE — 99205 PR OFFICE/OUTPT VISIT, NEW, LEVL V, 60-74 MIN: ICD-10-PCS | Mod: S$PBB,,, | Performed by: STUDENT IN AN ORGANIZED HEALTH CARE EDUCATION/TRAINING PROGRAM

## 2023-06-02 PROCEDURE — 93010 RHYTHM STRIP: ICD-10-PCS | Mod: S$PBB,,, | Performed by: INTERNAL MEDICINE

## 2023-06-02 PROCEDURE — 99999 PR PBB SHADOW E&M-EST. PATIENT-LVL III: CPT | Mod: PBBFAC,,, | Performed by: STUDENT IN AN ORGANIZED HEALTH CARE EDUCATION/TRAINING PROGRAM

## 2023-06-02 PROCEDURE — 99999 PR PBB SHADOW E&M-EST. PATIENT-LVL III: ICD-10-PCS | Mod: PBBFAC,,, | Performed by: STUDENT IN AN ORGANIZED HEALTH CARE EDUCATION/TRAINING PROGRAM

## 2023-06-02 PROCEDURE — 93010 ELECTROCARDIOGRAM REPORT: CPT | Mod: S$PBB,,, | Performed by: INTERNAL MEDICINE

## 2023-06-02 PROCEDURE — 99205 OFFICE O/P NEW HI 60 MIN: CPT | Mod: S$PBB,,, | Performed by: STUDENT IN AN ORGANIZED HEALTH CARE EDUCATION/TRAINING PROGRAM

## 2023-06-02 PROCEDURE — 93005 ELECTROCARDIOGRAM TRACING: CPT | Mod: PBBFAC | Performed by: INTERNAL MEDICINE

## 2023-06-06 ENCOUNTER — TELEPHONE (OUTPATIENT)
Dept: ELECTROPHYSIOLOGY | Facility: CLINIC | Age: 83
End: 2023-06-06
Payer: MEDICARE

## 2023-06-06 ENCOUNTER — PATIENT MESSAGE (OUTPATIENT)
Dept: ELECTROPHYSIOLOGY | Facility: CLINIC | Age: 83
End: 2023-06-06
Payer: MEDICARE

## 2023-06-06 DIAGNOSIS — I48.0 PAROXYSMAL ATRIAL FIBRILLATION: Primary | ICD-10-CM

## 2023-06-06 NOTE — TELEPHONE ENCOUNTER
Left message for patient to return call to schedule SOILA/DCCV, per Dr Lagunas's note. Call back # left.

## 2023-06-06 NOTE — TELEPHONE ENCOUNTER
Spoke with patient and scheduled his procedure (SOILA/DCCV) for 6/12/2023. Procedure details reviewed and instructions will be sent via patient portal as requested.

## 2023-06-06 NOTE — TELEPHONE ENCOUNTER
----- Message from Judie Berger MA sent at 6/6/2023  9:41 AM CDT -----  Jeannie the patient is returning your phone called please call [phone]  Thank you

## 2023-06-09 ENCOUNTER — HOSPITAL ENCOUNTER (OUTPATIENT)
Dept: CARDIOLOGY | Facility: HOSPITAL | Age: 83
Discharge: HOME | End: 2023-06-09
Attending: INTERNAL MEDICINE
Payer: MEDICARE

## 2023-06-09 ENCOUNTER — TELEPHONE (OUTPATIENT)
Dept: ELECTROPHYSIOLOGY | Facility: CLINIC | Age: 83
End: 2023-06-09
Payer: MEDICARE

## 2023-06-09 VITALS
SYSTOLIC BLOOD PRESSURE: 108 MMHG | BODY MASS INDEX: 28.89 KG/M2 | HEIGHT: 73 IN | DIASTOLIC BLOOD PRESSURE: 72 MMHG | WEIGHT: 218 LBS | HEART RATE: 72 BPM

## 2023-06-09 DIAGNOSIS — I48.0 PAROXYSMAL ATRIAL FIBRILLATION: ICD-10-CM

## 2023-06-09 LAB
ASCENDING AORTA: 3.59 CM
AV INDEX (PROSTH): 0.52
AV MEAN GRADIENT: 5 MMHG
AV PEAK GRADIENT: 9 MMHG
AV VALVE AREA: 2.27 CM2
AV VELOCITY RATIO: 0.49
BSA FOR ECHO PROCEDURE: 2.26 M2
CV ECHO LV RWT: 0.31 CM
DOP CALC AO PEAK VEL: 1.52 M/S
DOP CALC AO VTI: 28.14 CM
DOP CALC LVOT AREA: 4.3 CM2
DOP CALC LVOT DIAMETER: 2.35 CM
DOP CALC LVOT PEAK VEL: 0.74 M/S
DOP CALC LVOT STROKE VOLUME: 63.94 CM3
DOP CALCLVOT PEAK VEL VTI: 14.75 CM
E/E' RATIO: 8.56 M/S
ECHO LV POSTERIOR WALL: 0.74 CM (ref 0.6–1.1)
EJECTION FRACTION: 50 %
FRACTIONAL SHORTENING: 26 % (ref 28–44)
INTERVENTRICULAR SEPTUM: 0.8 CM (ref 0.6–1.1)
LA MAJOR: 6.74 CM
LA MINOR: 6.74 CM
LA WIDTH: 4.52 CM
LEFT ATRIUM SIZE: 4.16 CM
LEFT ATRIUM VOLUME INDEX MOD: 38.6 ML/M2
LEFT ATRIUM VOLUME INDEX: 48.3 ML/M2
LEFT ATRIUM VOLUME MOD: 86.17 CM3
LEFT ATRIUM VOLUME: 107.72 CM3
LEFT INTERNAL DIMENSION IN SYSTOLE: 3.46 CM (ref 2.1–4)
LEFT VENTRICLE DIASTOLIC VOLUME INDEX: 46.01 ML/M2
LEFT VENTRICLE DIASTOLIC VOLUME: 102.6 ML
LEFT VENTRICLE MASS INDEX: 52 G/M2
LEFT VENTRICLE SYSTOLIC VOLUME INDEX: 22.1 ML/M2
LEFT VENTRICLE SYSTOLIC VOLUME: 49.38 ML
LEFT VENTRICULAR INTERNAL DIMENSION IN DIASTOLE: 4.7 CM (ref 3.5–6)
LEFT VENTRICULAR MASS: 116.37 G
LV LATERAL E/E' RATIO: 7.7 M/S
LV SEPTAL E/E' RATIO: 9.63 M/S
MV PEAK E VEL: 0.77 M/S
PISA MRMAX VEL: 0.04 M/S
PISA TR MAX VEL: 2.31 M/S
PULM VEIN S/D RATIO: 0.52
PV PEAK D VEL: 0.5 M/S
PV PEAK S VEL: 0.26 M/S
RA MAJOR: 4.62 CM
RA PRESSURE: 3 MMHG
RA WIDTH: 4.22 CM
RIGHT VENTRICULAR END-DIASTOLIC DIMENSION: 4.72 CM
RV TISSUE DOPPLER FREE WALL SYSTOLIC VELOCITY 1 (APICAL 4 CHAMBER VIEW): 14 CM/S
SINUS: 3.07 CM
STJ: 3.03 CM
TDI LATERAL: 0.1 M/S
TDI SEPTAL: 0.08 M/S
TDI: 0.09 M/S
TR MAX PG: 21 MMHG
TRICUSPID ANNULAR PLANE SYSTOLIC EXCURSION: 2.12 CM
TV REST PULMONARY ARTERY PRESSURE: 24 MMHG

## 2023-06-09 PROCEDURE — 93306 ECHO (CUPID ONLY): ICD-10-PCS | Mod: 26,,, | Performed by: INTERNAL MEDICINE

## 2023-06-09 PROCEDURE — 93306 TTE W/DOPPLER COMPLETE: CPT | Mod: 26,,, | Performed by: INTERNAL MEDICINE

## 2023-06-09 PROCEDURE — 93306 TTE W/DOPPLER COMPLETE: CPT

## 2023-06-09 NOTE — TELEPHONE ENCOUNTER
Spoke to Mrs. Healy, patient's wife.     CONFIRMED procedure arrival time of 1100    Reiterated instructions including:  -Directions to check in desk  -NPO after midnight night prior to procedure  -Pre-procedure LABS done, no alerts  -Confirmed no fever, cough, or shortness of breath    Patient verbalized understanding of above and appreciated the call.

## 2023-06-12 ENCOUNTER — HOSPITAL ENCOUNTER (OUTPATIENT)
Facility: HOSPITAL | Age: 83
Discharge: HOME | End: 2023-06-12
Attending: STUDENT IN AN ORGANIZED HEALTH CARE EDUCATION/TRAINING PROGRAM | Admitting: STUDENT IN AN ORGANIZED HEALTH CARE EDUCATION/TRAINING PROGRAM
Payer: MEDICARE

## 2023-06-12 ENCOUNTER — TELEPHONE (OUTPATIENT)
Dept: ENDOSCOPY | Facility: HOSPITAL | Age: 83
End: 2023-06-12
Payer: MEDICARE

## 2023-06-12 ENCOUNTER — HOSPITAL ENCOUNTER (OUTPATIENT)
Dept: CARDIOLOGY | Facility: HOSPITAL | Age: 83
Discharge: HOME | End: 2023-06-12
Attending: STUDENT IN AN ORGANIZED HEALTH CARE EDUCATION/TRAINING PROGRAM | Admitting: STUDENT IN AN ORGANIZED HEALTH CARE EDUCATION/TRAINING PROGRAM
Payer: MEDICARE

## 2023-06-12 ENCOUNTER — ANESTHESIA (OUTPATIENT)
Dept: MEDSURG UNIT | Facility: HOSPITAL | Age: 83
End: 2023-06-12
Payer: MEDICARE

## 2023-06-12 ENCOUNTER — ANESTHESIA EVENT (OUTPATIENT)
Dept: MEDSURG UNIT | Facility: HOSPITAL | Age: 83
End: 2023-06-12
Payer: MEDICARE

## 2023-06-12 VITALS
DIASTOLIC BLOOD PRESSURE: 75 MMHG | BODY MASS INDEX: 29.12 KG/M2 | HEART RATE: 54 BPM | WEIGHT: 215 LBS | SYSTOLIC BLOOD PRESSURE: 139 MMHG | HEIGHT: 72 IN

## 2023-06-12 VITALS
HEART RATE: 52 BPM | HEIGHT: 72 IN | DIASTOLIC BLOOD PRESSURE: 74 MMHG | TEMPERATURE: 97 F | BODY MASS INDEX: 29.12 KG/M2 | WEIGHT: 215 LBS | SYSTOLIC BLOOD PRESSURE: 120 MMHG | RESPIRATION RATE: 14 BRPM | OXYGEN SATURATION: 97 %

## 2023-06-12 DIAGNOSIS — I48.91 ATRIAL FIBRILLATION: ICD-10-CM

## 2023-06-12 DIAGNOSIS — I49.9 ARRHYTHMIA: ICD-10-CM

## 2023-06-12 DIAGNOSIS — I48.0 PAROXYSMAL ATRIAL FIBRILLATION: Primary | ICD-10-CM

## 2023-06-12 DIAGNOSIS — I48.0 PAROXYSMAL ATRIAL FIBRILLATION: ICD-10-CM

## 2023-06-12 LAB
BSA FOR ECHO PROCEDURE: 2.23 M2
EJECTION FRACTION: 50 %

## 2023-06-12 PROCEDURE — D9220A PRA ANESTHESIA: ICD-10-PCS | Mod: ANES,,, | Performed by: ANESTHESIOLOGY

## 2023-06-12 PROCEDURE — D9220A PRA ANESTHESIA: ICD-10-PCS | Mod: CRNA,,, | Performed by: NURSE ANESTHETIST, CERTIFIED REGISTERED

## 2023-06-12 PROCEDURE — 93005 ELECTROCARDIOGRAM TRACING: CPT | Mod: 59

## 2023-06-12 PROCEDURE — 63600175 PHARM REV CODE 636 W HCPCS: Performed by: NURSE ANESTHETIST, CERTIFIED REGISTERED

## 2023-06-12 PROCEDURE — 93320 DOPPLER ECHO COMPLETE: CPT | Mod: 26,,, | Performed by: INTERNAL MEDICINE

## 2023-06-12 PROCEDURE — 37000009 HC ANESTHESIA EA ADD 15 MINS: Performed by: STUDENT IN AN ORGANIZED HEALTH CARE EDUCATION/TRAINING PROGRAM

## 2023-06-12 PROCEDURE — 93325 TRANSESOPHAGEAL ECHO (TEE) (CUPID ONLY): ICD-10-PCS | Mod: 26,,, | Performed by: INTERNAL MEDICINE

## 2023-06-12 PROCEDURE — 93312 ECHO TRANSESOPHAGEAL: CPT | Mod: 26,,, | Performed by: INTERNAL MEDICINE

## 2023-06-12 PROCEDURE — 92960 PR CARDIOVERSION, ELECTIVE;EXTERN: ICD-10-PCS | Mod: ,,, | Performed by: STUDENT IN AN ORGANIZED HEALTH CARE EDUCATION/TRAINING PROGRAM

## 2023-06-12 PROCEDURE — D9220A PRA ANESTHESIA: Mod: ANES,,, | Performed by: ANESTHESIOLOGY

## 2023-06-12 PROCEDURE — 25000003 PHARM REV CODE 250: Performed by: NURSE ANESTHETIST, CERTIFIED REGISTERED

## 2023-06-12 PROCEDURE — 92960 CARDIOVERSION ELECTRIC EXT: CPT | Mod: ,,, | Performed by: STUDENT IN AN ORGANIZED HEALTH CARE EDUCATION/TRAINING PROGRAM

## 2023-06-12 PROCEDURE — 93010 ELECTROCARDIOGRAM REPORT: CPT | Mod: ,,, | Performed by: INTERNAL MEDICINE

## 2023-06-12 PROCEDURE — 92960 CARDIOVERSION ELECTRIC EXT: CPT | Performed by: STUDENT IN AN ORGANIZED HEALTH CARE EDUCATION/TRAINING PROGRAM

## 2023-06-12 PROCEDURE — 93320 TRANSESOPHAGEAL ECHO (TEE) (CUPID ONLY): ICD-10-PCS | Mod: 26,,, | Performed by: INTERNAL MEDICINE

## 2023-06-12 PROCEDURE — 37000008 HC ANESTHESIA 1ST 15 MINUTES: Performed by: STUDENT IN AN ORGANIZED HEALTH CARE EDUCATION/TRAINING PROGRAM

## 2023-06-12 PROCEDURE — 25000003 PHARM REV CODE 250: Performed by: STUDENT IN AN ORGANIZED HEALTH CARE EDUCATION/TRAINING PROGRAM

## 2023-06-12 PROCEDURE — D9220A PRA ANESTHESIA: Mod: CRNA,,, | Performed by: NURSE ANESTHETIST, CERTIFIED REGISTERED

## 2023-06-12 PROCEDURE — 93325 DOPPLER ECHO COLOR FLOW MAPG: CPT

## 2023-06-12 PROCEDURE — 93312 TRANSESOPHAGEAL ECHO (TEE) (CUPID ONLY): ICD-10-PCS | Mod: 26,,, | Performed by: INTERNAL MEDICINE

## 2023-06-12 PROCEDURE — 93325 DOPPLER ECHO COLOR FLOW MAPG: CPT | Mod: 26,,, | Performed by: INTERNAL MEDICINE

## 2023-06-12 PROCEDURE — 93010 EKG 12-LEAD: ICD-10-PCS | Mod: ,,, | Performed by: INTERNAL MEDICINE

## 2023-06-12 RX ORDER — FLECAINIDE ACETATE 100 MG/1
100 TABLET ORAL EVERY 12 HOURS
Qty: 60 TABLET | Refills: 3 | Status: SHIPPED | OUTPATIENT
Start: 2023-06-12 | End: 2023-08-03

## 2023-06-12 RX ORDER — SILVER SULFADIAZINE 10 G/1000G
CREAM TOPICAL
Status: DISCONTINUED | OUTPATIENT
Start: 2023-06-12 | End: 2023-06-12 | Stop reason: HOSPADM

## 2023-06-12 RX ORDER — PROPOFOL 10 MG/ML
VIAL (ML) INTRAVENOUS CONTINUOUS PRN
Status: DISCONTINUED | OUTPATIENT
Start: 2023-06-12 | End: 2023-06-12

## 2023-06-12 RX ORDER — DEXMEDETOMIDINE HYDROCHLORIDE 100 UG/ML
INJECTION, SOLUTION INTRAVENOUS
Status: DISCONTINUED | OUTPATIENT
Start: 2023-06-12 | End: 2023-06-12

## 2023-06-12 RX ORDER — DIPHENHYDRAMINE HYDROCHLORIDE 50 MG/ML
25 INJECTION INTRAMUSCULAR; INTRAVENOUS EVERY 6 HOURS PRN
Status: DISCONTINUED | OUTPATIENT
Start: 2023-06-12 | End: 2023-06-12 | Stop reason: HOSPADM

## 2023-06-12 RX ORDER — LIDOCAINE HYDROCHLORIDE 20 MG/ML
INJECTION INTRAVENOUS
Status: DISCONTINUED | OUTPATIENT
Start: 2023-06-12 | End: 2023-06-12

## 2023-06-12 RX ORDER — HYDROMORPHONE HYDROCHLORIDE 1 MG/ML
0.2 INJECTION, SOLUTION INTRAMUSCULAR; INTRAVENOUS; SUBCUTANEOUS EVERY 5 MIN PRN
Status: DISCONTINUED | OUTPATIENT
Start: 2023-06-12 | End: 2023-06-12 | Stop reason: HOSPADM

## 2023-06-12 RX ORDER — FENTANYL CITRATE 50 UG/ML
25 INJECTION, SOLUTION INTRAMUSCULAR; INTRAVENOUS EVERY 5 MIN PRN
Status: DISCONTINUED | OUTPATIENT
Start: 2023-06-12 | End: 2023-06-12 | Stop reason: HOSPADM

## 2023-06-12 RX ORDER — PROPOFOL 10 MG/ML
VIAL (ML) INTRAVENOUS
Status: DISCONTINUED | OUTPATIENT
Start: 2023-06-12 | End: 2023-06-12

## 2023-06-12 RX ORDER — METOPROLOL SUCCINATE 25 MG/1
12.5 TABLET, EXTENDED RELEASE ORAL DAILY
Qty: 15 TABLET | Refills: 3 | Status: SHIPPED | OUTPATIENT
Start: 2023-06-12 | End: 2023-06-19

## 2023-06-12 RX ORDER — PHENYLEPHRINE HYDROCHLORIDE 10 MG/ML
INJECTION INTRAVENOUS
Status: DISCONTINUED | OUTPATIENT
Start: 2023-06-12 | End: 2023-06-12

## 2023-06-12 RX ORDER — ONDANSETRON 2 MG/ML
4 INJECTION INTRAMUSCULAR; INTRAVENOUS ONCE AS NEEDED
Status: DISCONTINUED | OUTPATIENT
Start: 2023-06-12 | End: 2023-06-12 | Stop reason: HOSPADM

## 2023-06-12 RX ADMIN — PHENYLEPHRINE HYDROCHLORIDE 200 MCG: 10 INJECTION INTRAVENOUS at 01:06

## 2023-06-12 RX ADMIN — DEXMEDETOMIDINE HYDROCHLORIDE 4 MCG: 100 INJECTION, SOLUTION INTRAVENOUS at 01:06

## 2023-06-12 RX ADMIN — LIDOCAINE HYDROCHLORIDE 50 MG: 20 INJECTION INTRAVENOUS at 01:06

## 2023-06-12 RX ADMIN — PHENYLEPHRINE HYDROCHLORIDE 100 MCG: 10 INJECTION INTRAVENOUS at 01:06

## 2023-06-12 RX ADMIN — SODIUM CHLORIDE: 9 INJECTION, SOLUTION INTRAVENOUS at 01:06

## 2023-06-12 RX ADMIN — Medication 150 MCG/KG/MIN: at 01:06

## 2023-06-12 RX ADMIN — PROPOFOL 50 MG: 10 INJECTION, EMULSION INTRAVENOUS at 01:06

## 2023-06-12 NOTE — SUBJECTIVE & OBJECTIVE
Past Medical History:   Diagnosis Date    GERD (gastroesophageal reflux disease)     HEARING LOSS     Hyperlipidemia     Hypertension        Past Surgical History:   Procedure Laterality Date    COLONOSCOPY N/A 7/19/2018    Procedure: COLONOSCOPY;  Surgeon: Abilio Ferrara MD;  Location: [facility] ENDO (4TH FLR);  Service: Endoscopy;  Laterality: N/A;    COLONOSCOPY N/A 4/27/2022    Procedure: COLONOSCOPY;  Surgeon: Rudy Grace MD;  Location: [facility] ENDO (4TH FLR);  Service: Endoscopy;  Laterality: N/A;  fully vaccinated    ESOPHAGOGASTRODUODENOSCOPY N/A 3/21/2023    Procedure: ESOPHAGOGASTRODUODENOSCOPY (EGD);  Surgeon: Wilmar Ordaz MD;  Location: [facility] ENDO (4TH FLR);  Service: Endoscopy;  Laterality: N/A;  inst portal-tb    ESOPHAGOGASTRODUODENOSCOPY N/A 5/1/2023    Procedure: EGD (ESOPHAGOGASTRODUODENOSCOPY);  Surgeon: Ajay Swain MD;  Location: [facility] ENDO (2ND FLR);  Service: Endoscopy;  Laterality: N/A;  4/13/23-Instructions via portal-DS  4/25 pre call no answer    EYE SURGERY         Review of patient's allergies indicates:  No Known Allergies    No current facility-administered medications on file prior to encounter.     Current Outpatient Medications on File Prior to Encounter   Medication Sig    amLODIPine (NORVASC) 5 MG tablet Take 1 tablet (5 mg total) by mouth once daily.    apixaban (ELIQUIS) 5 mg Tab Take 1 tablet (5 mg total) by mouth 2 (two) times daily.    atorvastatin (LIPITOR) 10 MG tablet Take 1 tablet (10 mg total) by mouth once daily.    cetirizine (ZYRTEC) 5 MG tablet Take 1 tablet (5 mg total) by mouth once daily.    citalopram (CELEXA) 20 MG tablet Take 1 tablet (20 mg total) by mouth once daily.    ferrous sulfate (FEOSOL) 325 mg (65 mg iron) Tab tablet Take 1 tablet (325 mg total) by mouth once daily.    hydrocortisone 2.5 % cream Apply topically 2 (two) times daily.    omeprazole (PRILOSEC) 40 MG capsule Take 1 capsule (40 mg total) by mouth once daily.     Family History        Problem Relation (Age of Onset)    Cancer Brother    Colon cancer Brother    Heart disease Brother    Liver cancer Sister    Melanoma Sister    Prostate cancer Brother          Tobacco Use    Smoking status: Former     Packs/day: 1.00     Years: 30.00     Pack years: 30.00     Types: Cigarettes     Quit date: 1997     Years since quittin.5    Smokeless tobacco: Former     Quit date: 12/10/1997   Substance and Sexual Activity    Alcohol use: Yes     Alcohol/week: 5.8 standard drinks     Types: 7 Standard drinks or equivalent per week     Comment: rum/coke 3-4 daily    Drug use: No    Sexual activity: Never     Review of Systems   Constitutional: Negative for fever and malaise/fatigue.   Eyes:  Negative for blurred vision and pain.   Cardiovascular:  Negative for chest pain, dyspnea on exertion, leg swelling, orthopnea, palpitations and paroxysmal nocturnal dyspnea.   Respiratory:  Negative for cough, shortness of breath, sputum production and wheezing.    Hematologic/Lymphatic: Negative for adenopathy and bleeding problem.   Skin:  Negative for rash.   Musculoskeletal:  Negative for back pain and neck pain.   Gastrointestinal:  Negative for abdominal pain, constipation, diarrhea, nausea and vomiting.   Genitourinary:  Negative for dysuria.   Neurological:  Negative for dizziness, headaches, light-headedness and weakness.   Objective:     Vital Signs (Most Recent):    Vital Signs (24h Range):           There is no height or weight on file to calculate BMI.            No intake or output data in the 24 hours ending 23 1249    Lines/Drains/Airways       None                    Physical Exam  Constitutional:       General: He is not in acute distress.     Appearance: Normal appearance. He is not ill-appearing, toxic-appearing or diaphoretic.   HENT:      Head: Normocephalic and atraumatic.      Nose: Nose normal.   Eyes:      Extraocular Movements: Extraocular movements intact.      Pupils: Pupils  are equal, round, and reactive to light.   Cardiovascular:      Rate and Rhythm: Normal rate. Rhythm irregular.      Heart sounds: No murmur heard.    No friction rub. No gallop.   Pulmonary:      Effort: Pulmonary effort is normal. No respiratory distress.      Breath sounds: Normal breath sounds. No wheezing or rales.   Abdominal:      General: Abdomen is flat. There is no distension.      Palpations: Abdomen is soft. There is no mass.      Tenderness: There is no abdominal tenderness.   Musculoskeletal:         General: No swelling. Normal range of motion.      Cervical back: Normal range of motion. No rigidity.      Right lower leg: No edema.      Left lower leg: No edema.   Skin:     General: Skin is warm and dry.      Coloration: Skin is not jaundiced.      Findings: No bruising.   Neurological:      General: No focal deficit present.      Mental Status: He is alert and oriented to person, place, and time.      Cranial Nerves: No cranial nerve deficit.      Motor: No weakness.        Significant Labs: BMP: No results for input(s): GLU, NA, K, CL, CO2, BUN, CREATININE, CALCIUM, MG in the last 48 hours. and CBC No results for input(s): WBC, HGB, HCT, PLT in the last 48 hours.    Significant Imaging: Reviewed

## 2023-06-12 NOTE — PLAN OF CARE
Received report from Delma. Patient s/p DCCV, AAOx3. VSS, no c/o pain or discomfort at this time, resp even and unlabored. Post procedure protocol reviewed with patient and patient's family. Understanding verbalized. Family members at bedside. Nurse call bell within reach. Will continue to monitor per post procedure protocol.

## 2023-06-12 NOTE — ASSESSMENT & PLAN NOTE
1. SOILA for evaluation of LORIE prior DCCV   -No absolute contraindications of esophageal stricture, tumor, perforation, laceration,or diverticulum and/or active GI bleed  -The risks, benefits & alternatives of the procedure were explained to the patient.   -The risks of transesophageal echo include but are not limited to:  Dental trauma, esophageal trauma/perforation, bleeding, laryngospasm/brochospasm, aspiration, sore throat/hoarseness, & dislodgement of the endotracheal tube/nasogastric tube (where applicable).    -The risks of moderate sedation include hypotension, respiratory depression, arrhythmias, bronchospasm, & death.    -Informed consent was obtained. The patient is agreeable to proceed with the procedure and all questions and concerns addressed.    Case discussed with an attending in echocardiography lab.     Further recommendations per attending addendum

## 2023-06-12 NOTE — ANESTHESIA POSTPROCEDURE EVALUATION
Anesthesia Post Evaluation    Patient: Adrian Healy    Procedure(s) Performed: Procedure(s) (LRB):  Cardioversion or Defibrillation (N/A)  Transesophageal echo (SOILA) intra-procedure log documentation (N/A)    Final Anesthesia Type: general      Level of consciousness: awake and alert  Post-procedure vital signs: reviewed and stable  Pain control: Pain has been treated.  Airway patency: patent    PONV status: Absent or treated.  Anesthetic complications: no      Cardiovascular status: hemodynamically stable  Respiratory status: unassisted  Hydration status: euvolemic            Vitals Value Taken Time   /55 06/12/23 1446   Temp 36.2 °C (97.2 °F) 06/12/23 1410   Pulse 45 06/12/23 1451   Resp 16 06/12/23 1451   SpO2 96 % 06/12/23 1451   Vitals shown include unvalidated device data.      No case tracking events are documented in the log.      Pain/Yuri Score: Yuri Score: 9 (6/12/2023  2:30 PM)

## 2023-06-12 NOTE — PROGRESS NOTES
Pt DC'd per MD order. Discharge instructions given including activity, future appointments, and when to call MD. Medications reviewed including drug name, indication, dosage, frequency, and when to take next dose. PIV DC'd, catheter tip intact. Pt left unit via wheelchair with transport and family.

## 2023-06-12 NOTE — TRANSFER OF CARE
Anesthesia Transfer of Care Note    Patient: Adrian Healy    Procedure(s) Performed: Procedure(s) (LRB):  Cardioversion or Defibrillation (N/A)  Transesophageal echo (SOILA) intra-procedure log documentation (N/A)    Patient location: PACU    Anesthesia Type: general    Transport from OR: Transported from OR on 6-10 L/min O2 by face mask with adequate spontaneous ventilation    Post pain: adequate analgesia    Post assessment: no apparent anesthetic complications    Post vital signs: stable    Level of consciousness: awake    Nausea/Vomiting: no nausea/vomiting    Complications: none    Transfer of care protocol was followed      Last vitals:   Visit Vitals  /77 (BP Location: Left arm, Patient Position: Lying)   Pulse 83   Temp 35.8 °C (96.4 °F) (Temporal)   Resp 18   Ht 6' (1.829 m)   Wt 97.5 kg (215 lb)   SpO2 97%   BMI 29.16 kg/m²

## 2023-06-12 NOTE — PLAN OF CARE
Pt arrived to unit accompanied by his wife.  Pre op orders and assessment initiated.  Pt remains npo.  Call bell within reach.

## 2023-06-12 NOTE — HPI
Mr. Adrian Healy is a alicia 82-year-old gentleman who presents today for persistent atrial fibrillation and SOILA/DCCV. He has a past medical history significant for a new diagnosis of persistent atrial fibrillation noted during an outpatient EGD, GERD with a history of gastric polyps, hypertension, hyperlipidemia, hearing loss, and overweight BMI.     He denies any issues at this time.     Dysphagia or odynophagia:  No  Liver Disease, esophageal disease, or known varices:  No  Upper GI Bleeding: No  Snoring:  No  Sleep Apnea:  No  Prior neck surgery or radiation:  No  History of anesthetic difficulties:  No  Family history of anesthetic difficulties:  No  Last oral intake:  12 hours ago  Able to move neck in all directions:  Yes    6/9/23 TTE   The left ventricle is normal in size with mildly decreased systolic function. The estimated ejection fraction is 50%.  Mild right ventricular enlargement with normal right ventricular systolic function.  Severe left atrial enlargement.  The estimated PA systolic pressure is 24 mmHg.  Normal central venous pressure (3 mmHg).  Atrial fibrillation observed.

## 2023-06-12 NOTE — DISCHARGE SUMMARY
[facility] Stay Cardiac [facility]  Cardiac Electrophysiology  Discharge Summary      Patient Name: Adrian Healy  MRN: [id number]  Admission Date: 6/12/2023  Hospital Length of Stay: 0 days  Discharge Date and Time: 6/12/2023  4:09 PM  Attending Physician:Dari Lagunas MD  Discharging Provider: Shara Peralta NP  Primary Care Physician: Xander Will MD    HPI: Mr. Healy is a 83 year old male who presents today to [facility] for scheduled SOILA/DCCV with Dr. Lagunas. He denies any chest pain, palpitations, SOB, WILSON, dizziness, light headedness, weakness, LE swelling, syncope, or near syncopal episodes. He denies any bleeding, infections, fevers, rashes, or surgeries in the past 30 days. VSS. He is currently taking eliquis 5 mg BID (last dose taken this AM) and amlodipine 5 mg daily.    Labs: Pre procedure labs from 6/9/23 reviewed.      Significant Studies: ECG today reveals AF at 91 BPM.      Plan:  -SOILA  -DCCV  - Anesthesia for sedation.     Procedure(s) (LRB):  Cardioversion or Defibrillation (N/A)  Transesophageal echo (SOILA) intra-procedure log documentation (N/A)     Indwelling Lines/Drains at time of discharge:  Lines/Drains/Airways    none    Hospital Course: Mr. Healy presented in AF and currently on eliquis. He underwent SOILA without evidence of LORIE thrombus. Proceeded with DCCV 200 J x 1 shock, converting from AF to sinus rhythm. He tolerated the procedure without any acute complications. Post procedure rhythm strip shows sinus bradycardia at 54 bpm  ms  ms QT/Qtc 450/430 ms. Post-DCCV ECG and discharge plans was reviewed with Dr. Lagunas. We will plan to continue all home medications including eliquis 5 mg BID and amlodipine 5 mg daily. Start flecainide 100 mg two times daily and metoprolol 12.5 mg once daily. Monitor for symptomatic bradycardia. Instructed to return in 1 week for follow up ECG and in 4 weeks for clinic follow up with Dr. Heidi Simpson NP.   Mr. Healy was assessed  at bedside prior to discharge. He reported feeling well and denied chest discomfort, shortness of breath, palpitations, lightheadedness, or any other acute symptoms. Discharge instructions were discussed with patient and wife and all questions were answered. He was discharged home in stable condition.     Goals of Care Treatment Preferences:  Code Status: Full Code    Consults: Cardiology SOILA    Significant Diagnostic Studies: SOILA    Final Active Diagnoses:    Diagnosis Date Noted POA    PRINCIPAL PROBLEM:  Persistent atrial fibrillation [I48.19] 05/23/2023 Yes      Problems Resolved During this Admission:     Discharged Condition: stable    Disposition: Home or Self Care    Follow Up:   Follow-up Information       [facility]    Specialty: Cardiology  Why: Post cardioversion             Heidi Simpson NP Follow up in 1 month(s).    Specialty: Cardiology  Why: Post cardioversion, initiation of flecainide and metoprolol  Contact information:  [address]  [facility] [address]  [phone]                           Patient Instructions:      No driving until:   Order Comments: No driving or operating heavy machinery for 24-48 hours after your procedure because you received sedation.     Other restrictions (specify):   Order Comments: Medications:  -Continue to take your home medications as listed on your medication list after you are discharged.    New Medications:  -Flecainide 100 mg two times daily.  -Metoprolol 12.5 mg once daily.    Please call our clinic if you have any concerns about your new medications. Monitor for signs and symptoms of a slow heart rate (Less than 50 beats per minute) such as dizziness, fatigue, lightheadedness, fainting, confusion, or chest discomfort. If these occur notify our clinic and we can adjust your new medications.   Diet  -You may resume oral intake after you are discharged, as long you have no swallowing difficulties.    Because you have  received sedation for this procedure:  -Limit activity for the remainder of the day.  -Do not smoke for at least 6 hours and until you are fully awake and alert.  -Do not drink alcoholic beverage for 24 hours.  -Do not drive for 24 hours.  -Defer important decision making until the following day.     Go to the Emergency Department if you develop:   -Bleeding  -Weakness or numbness  -Visual, gait or speech disturbance  -New chest pain, palpitations, shortness of breath, rapid heart beat, or fainting  -Fever    Follow up:  -EKG in 1 week.  -Dr. Heidi Simpson, NP in 1 month.     Any need to reschedule or cancel procedures, or any questions regarding your procedures should be addressed directly with the Arrhythmia Department Nurses at the following phone number: [phone].     Notify your health care provider if you experience any of the following:  increased confusion or weakness     Notify your health care provider if you experience any of the following:  persistent dizziness, light-headedness, or visual disturbances     Notify your health care provider if you experience any of the following:  worsening rash     Notify your health care provider if you experience any of the following:  severe persistent headache     Notify your health care provider if you experience any of the following:  difficulty breathing or increased cough     Notify your health care provider if you experience any of the following:  severe uncontrolled pain     Notify your health care provider if you experience any of the following:  redness, tenderness, or signs of infection (pain, swelling, redness, odor or green/yellow discharge around incision site)     Notify your health care provider if you experience any of the following:  persistent nausea and vomiting or diarrhea     Notify your health care provider if you experience any of the following:  temperature >100.4     Medications:  Reconciled Home Medications:      Medication List         START taking these medications      flecainide 100 MG Tab  Commonly known as: TAMBOCOR  Take 1 tablet (100 mg total) by mouth every 12 (twelve) hours.     metoprolol succinate 25 MG 24 hr tablet  Commonly known as: TOPROL-XL  Take 0.5 tablets (12.5 mg total) by mouth once daily.            CONTINUE taking these medications      amLODIPine 5 MG tablet  Commonly known as: NORVASC  Take 1 tablet (5 mg total) by mouth once daily.     apixaban 5 mg Tab  Commonly known as: ELIQUIS  Take 1 tablet (5 mg total) by mouth 2 (two) times daily.     atorvastatin 10 MG tablet  Commonly known as: LIPITOR  Take 1 tablet (10 mg total) by mouth once daily.     cetirizine 5 MG tablet  Commonly known as: ZYRTEC  Take 1 tablet (5 mg total) by mouth once daily.     citalopram 20 MG tablet  Commonly known as: CeleXA  Take 1 tablet (20 mg total) by mouth once daily.     ferrous sulfate 325 mg (65 mg iron) Tab tablet  Commonly known as: FEOSOL  Take 1 tablet (325 mg total) by mouth once daily.     hydrocortisone 2.5 % cream  Apply topically 2 (two) times daily.     omeprazole 40 MG capsule  Commonly known as: PRILOSEC  Take 1 capsule (40 mg total) by mouth once daily.            Plan:  -Start Flecainide 100 mg BID.  -Start Metoprolol 12.5 mg daily.  -Continue all other home medications.  -Follow up EKG in 1 week  -Follow up with Heidi Simpson NP in 1 month.    Time spent on the discharge of patient: 15 minutes    Shara Peralta NP  Cardiac Electrophysiology  [facility] - Arrhythmia    Attending: Dari Lagunas MD

## 2023-06-12 NOTE — INTERVAL H&P NOTE
Mr. Healy is a 83 year old male who presents today to [facility] for scheduled SOILA/DCCV with Dr. Lagunas. He denies any chest pain, palpitations, SOB, WILSON, dizziness, light headedness, weakness, LE swelling, syncope, or near syncopal episodes. He denies any bleeding, infections, fevers, rashes, or surgeries in the past 30 days. VSS. He is currently taking eliquis 5 mg BID (last dose taken this AM) and amlodipine 5 mg daily.    The patient has been examined and the H&P has been reviewed:     I concur with the findings and no changes have occurred since H&P was written.      Review of Systems   Constitution: Negative. Negative for fevers/chills, weakness and malaise/fatigue.   HENT: Negative.  Negative for ear pain and tinnitus.    Eyes: Negative for blurred vision.   Cardiovascular: Negative. Negative for chest pain, dyspnea on exertion, leg swelling, near-syncope, palpitations and syncope.   Respiratory: Negative. Negative for shortness of breath.    Endocrine: Negative.  Negative for polyuria.   Hematologic/Lymphatic: Positive for bruise/bleed easily. Negative for significant bleeding.  Skin: Negative.  Negative for rash.   Musculoskeletal: Negative.  Negative for joint pain and muscle weakness.   Gastrointestinal: Negative.  Negative for abdominal pain, hematemesis, and change in bowel habit.   Genitourinary: Negative for frequency or hematuria.   Neurological: Negative. Negative for dizziness.   Psychiatric/Behavioral: Negative. Negative for depression. The patient is not nervous/anxious.       Physical Exam   Constitutional: Oriented to person, place, and time. Appears well-developed and well-nourished.   HENT:   Head: Normocephalic and atraumatic.   Eyes: Conjunctivae, EOM and lids are normal. No scleral icterus.   Neck: Normal range of motion.   Cardiovascular: Normal rate and intact distal pulses. Irregular rhythm present.   Pulses:       Radial pulses are 2+ on the right side, and 2+ on the left side.    Pulmonary/Chest: Effort normal and breath sounds normal. No accessory muscle usage. No tachypnea. No respiratory distress. No wheezes.   Abdominal: Soft. Bowel sounds are normal. No distension. There is no tenderness.   Musculoskeletal: Normal range of motion. No edema. No focal numbness or weakness.  Neurological: Alert and oriented to person, place, and time. Normal muscle tone.   Skin: Skin is warm and dry. No rash noted.   Psychiatric: Normal mood and affect. Behavior is normal.   Nursing note and vitals reviewed.     Labs: Pre procedure labs from 6/9/23 reviewed.      Significant Studies: ECG today reveals AF at 91 BPM.     Active Hospital Problems    Diagnosis  POA    Persistent atrial fibrillation [I48.19]  Yes      Resolved Hospital Problems   No resolved problems to display.     Plan:  -SOILA  -DCCV  - Anesthesia for sedation.     Prior to procedure, extensive discussion with patient regarding risks and benefits of DCCV, and patient would like to proceed. Dr. Lagunas at bedside to answer all questions.  The patient and wife voice understanding and all questions have been answered. No further questions/concerns voiced at this time. Consents signed.    JAZMINE Odonnell-PAMELA  Cardiology Electrophysiology NP   [facility]    Attending: Dari Lagunas MD

## 2023-06-12 NOTE — ANESTHESIA PREPROCEDURE EVALUATION
06/12/2023  Adrian Healy is a 83 y.o., male.  Patient Active Problem List   Diagnosis    Hyperlipidemia    Hypertension    GERD (gastroesophageal reflux disease)    Hearing loss, sensorineural    Special screening for malignant neoplasms, colon    Anxiety    Jaw pain    Popping of both temporomandibular joints on opening of jaw    Persistent atrial fibrillation           Pre-op Assessment          Review of Systems      Physical Exam    Airway:  No airway management difficulties anticipated  Dental:No active dental issues noted  Chest/Lungs:  Clear to auscultation    Heart:  Rate: Normal  Rhythm: Regular Rhythm  Sounds: Normal        Anesthesia Plan  Type of Anesthesia, risks & benefits discussed:    Anesthesia Type: Gen Natural Airway, Gen ETT  Informed Consent: Informed consent signed with the Patient and all parties understand the risks and agree with anesthesia plan.  All questions answered.   ASA Score: 3  Anesthesia Plan Notes: Chart reviewed. Patient seen and examined. Anesthesia plan discussed and questions answered. E-consent signed. Mao Sher MD    Ready For Surgery From Anesthesia Perspective.     .

## 2023-06-12 NOTE — DISCHARGE INSTRUCTIONS
Medications:  -Continue to take your home medications as listed on your medication list after you are discharged.    New Medications:  -Flecainide 100 mg two times daily.  -Metoprolol 12.5 mg once daily.    Please call our clinic if you have any concerns about your new medications. Monitor for signs and symptoms of a slow heart rate (Less than 50 beats per minute) such as dizziness, fatigue, lightheadedness, fainting, confusion, or chest discomfort. If these occur notify our clinic and we can adjust your new medications.     Diet  -You may resume oral intake after you are discharged, as long you have no swallowing difficulties.    Because you have received sedation for this procedure:  -Limit activity for the remainder of the day.  -Do not smoke for at least 6 hours and until you are fully awake and alert.  -Do not drink alcoholic beverage for 24 hours.  -Do not drive for 24 hours.  -Defer important decision making until the following day.     Go to the Emergency Department if you develop:   -Bleeding  -Weakness or numbness  -Visual, gait or speech disturbance  -New chest pain, palpitations, shortness of breath, rapid heart beat, or fainting  -Fever    Follow up:  -EKG in 1 week.  -Dr. Heidi Simpson, NP in 1 month.     Any need to reschedule or cancel procedures, or any questions regarding your procedures should be addressed directly with the Arrhythmia Department Nurses at the following phone number: [phone].

## 2023-06-12 NOTE — CONSULTS
[facility] Short Stay Cardiac [facility]  Cardiology  Consult Note    Patient Name: Adrian Healy  MRN: [id number]  Admission Date: 6/12/2023  Hospital Length of Stay: 0 days  Code Status: Prior   Attending Provider: SHARI Lagunas MD   Consulting Provider: Trell Drummond MD  Primary Care Physician: Xander Will MD  Principal Problem:<principal problem not specified>    Patient information was obtained from patient and ER records.     Consults  Subjective:     Chief Complaint:  AF      HPI:   Mr. Adrian Healy is a alicia 82-year-old gentleman who presents today for persistent atrial fibrillation and SOILA/DCCV. He has a past medical history significant for a new diagnosis of persistent atrial fibrillation noted during an outpatient EGD, GERD with a history of gastric polyps, hypertension, hyperlipidemia, hearing loss, and overweight BMI.     He denies any issues at this time.     Dysphagia or odynophagia:  No  Liver Disease, esophageal disease, or known varices:  No  Upper GI Bleeding: No  Snoring:  No  Sleep Apnea:  No  Prior neck surgery or radiation:  No  History of anesthetic difficulties:  No  Family history of anesthetic difficulties:  No  Last oral intake:  12 hours ago  Able to move neck in all directions:  Yes    6/9/23 TTE    The left ventricle is normal in size with mildly decreased systolic function. The estimated ejection fraction is 50%.   Mild right ventricular enlargement with normal right ventricular systolic function.   Severe left atrial enlargement.   The estimated PA systolic pressure is 24 mmHg.   Normal central venous pressure (3 mmHg).   Atrial fibrillation observed.      Past Medical History:   Diagnosis Date    GERD (gastroesophageal reflux disease)     HEARING LOSS     Hyperlipidemia     Hypertension        Past Surgical History:   Procedure Laterality Date    COLONOSCOPY N/A 7/19/2018    Procedure: COLONOSCOPY;  Surgeon: Abilio Ferrara MD;  Location: [facility] ([address] [place] [address]);   Service: Endoscopy;  Laterality: N/A;    COLONOSCOPY N/A 4/27/2022    Procedure: COLONOSCOPY;  Surgeon: Rudy Grace MD;  Location: [facility] (4TH FLR);  Service: Endoscopy;  Laterality: N/A;  fully vaccinated    ESOPHAGOGASTRODUODENOSCOPY N/A 3/21/2023    Procedure: ESOPHAGOGASTRODUODENOSCOPY (EGD);  Surgeon: Wilmar Ordaz MD;  Location: [facility] ENDO (4TH FLR);  Service: Endoscopy;  Laterality: N/A;  inst portal-tb    ESOPHAGOGASTRODUODENOSCOPY N/A 5/1/2023    Procedure: EGD (ESOPHAGOGASTRODUODENOSCOPY);  Surgeon: Ajay Swain MD;  Location: [facility] (2ND FLR);  Service: Endoscopy;  Laterality: N/A;  4/13/23-Instructions via portal-DS  4/25 pre call no answer    EYE SURGERY         Review of patient's allergies indicates:  No Known Allergies    No current facility-administered medications on file prior to encounter.     Current Outpatient Medications on File Prior to Encounter   Medication Sig    amLODIPine (NORVASC) 5 MG tablet Take 1 tablet (5 mg total) by mouth once daily.    apixaban (ELIQUIS) 5 mg Tab Take 1 tablet (5 mg total) by mouth 2 (two) times daily.    atorvastatin (LIPITOR) 10 MG tablet Take 1 tablet (10 mg total) by mouth once daily.    cetirizine (ZYRTEC) 5 MG tablet Take 1 tablet (5 mg total) by mouth once daily.    citalopram (CELEXA) 20 MG tablet Take 1 tablet (20 mg total) by mouth once daily.    ferrous sulfate (FEOSOL) 325 mg (65 mg iron) Tab tablet Take 1 tablet (325 mg total) by mouth once daily.    hydrocortisone 2.5 % cream Apply topically 2 (two) times daily.    omeprazole (PRILOSEC) 40 MG capsule Take 1 capsule (40 mg total) by mouth once daily.     Family History       Problem Relation (Age of Onset)    Cancer Brother    Colon cancer Brother    Heart disease Brother    Liver cancer Sister    Melanoma Sister    Prostate cancer Brother          Tobacco Use    Smoking status: Former     Packs/day: 1.00     Years: 30.00     Pack years: 30.00     Types: Cigarettes      Quit date: 1997     Years since quittin.5    Smokeless tobacco: Former     Quit date: 12/10/1997   Substance and Sexual Activity    Alcohol use: Yes     Alcohol/week: 5.8 standard drinks     Types: 7 Standard drinks or equivalent per week     Comment: rum/coke 3-4 daily    Drug use: No    Sexual activity: Never     Review of Systems   Constitutional: Negative for fever and malaise/fatigue.   Eyes:  Negative for blurred vision and pain.   Cardiovascular:  Negative for chest pain, dyspnea on exertion, leg swelling, orthopnea, palpitations and paroxysmal nocturnal dyspnea.   Respiratory:  Negative for cough, shortness of breath, sputum production and wheezing.    Hematologic/Lymphatic: Negative for adenopathy and bleeding problem.   Skin:  Negative for rash.   Musculoskeletal:  Negative for back pain and neck pain.   Gastrointestinal:  Negative for abdominal pain, constipation, diarrhea, nausea and vomiting.   Genitourinary:  Negative for dysuria.   Neurological:  Negative for dizziness, headaches, light-headedness and weakness.   Objective:     Vital Signs (Most Recent):    Vital Signs (24h Range):           There is no height or weight on file to calculate BMI.            No intake or output data in the 24 hours ending 23 1249    Lines/Drains/Airways       None                    Physical Exam  Constitutional:       General: He is not in acute distress.     Appearance: Normal appearance. He is not ill-appearing, toxic-appearing or diaphoretic.   HENT:      Head: Normocephalic and atraumatic.      Nose: Nose normal.   Eyes:      Extraocular Movements: Extraocular movements intact.      Pupils: Pupils are equal, round, and reactive to light.   Cardiovascular:      Rate and Rhythm: Normal rate. Rhythm irregular.      Heart sounds: No murmur heard.    No friction rub. No gallop.   Pulmonary:      Effort: Pulmonary effort is normal. No respiratory distress.      Breath sounds: Normal breath  sounds. No wheezing or rales.   Abdominal:      General: Abdomen is flat. There is no distension.      Palpations: Abdomen is soft. There is no mass.      Tenderness: There is no abdominal tenderness.   Musculoskeletal:         General: No swelling. Normal range of motion.      Cervical back: Normal range of motion. No rigidity.      Right lower leg: No edema.      Left lower leg: No edema.   Skin:     General: Skin is warm and dry.      Coloration: Skin is not jaundiced.      Findings: No bruising.   Neurological:      General: No focal deficit present.      Mental Status: He is alert and oriented to person, place, and time.      Cranial Nerves: No cranial nerve deficit.      Motor: No weakness.        Significant Labs: BMP: No results for input(s): GLU, NA, K, CL, CO2, BUN, CREATININE, CALCIUM, MG in the last 48 hours. and CBC No results for input(s): WBC, HGB, HCT, PLT in the last 48 hours.    Significant Imaging: Reviewed     Assessment and Plan:     Persistent atrial fibrillation  1. SOILA for evaluation of LORIE prior DCCV   -No absolute contraindications of esophageal stricture, tumor, perforation, laceration,or diverticulum and/or active GI bleed  -The risks, benefits & alternatives of the procedure were explained to the patient.   -The risks of transesophageal echo include but are not limited to:  Dental trauma, esophageal trauma/perforation, bleeding, laryngospasm/brochospasm, aspiration, sore throat/hoarseness, & dislodgement of the endotracheal tube/nasogastric tube (where applicable).    -The risks of moderate sedation include hypotension, respiratory depression, arrhythmias, bronchospasm, & death.    -Informed consent was obtained. The patient is agreeable to proceed with the procedure and all questions and concerns addressed.    Case discussed with an attending in echocardiography lab.     Further recommendations per attending addendum          VTE Risk Mitigation (From admission, onward)    None           Thank you for your consult. I will follow-up with patient. Please contact us if you have any additional questions.    rTell Drummond MD  Cardiology   [facility] - Short Stay Cardiac Unit

## 2023-06-19 ENCOUNTER — HOSPITAL ENCOUNTER (OUTPATIENT)
Dept: CARDIOLOGY | Facility: CLINIC | Age: 83
Discharge: HOME | End: 2023-06-19
Payer: MEDICARE

## 2023-06-19 ENCOUNTER — PATIENT MESSAGE (OUTPATIENT)
Dept: ELECTROPHYSIOLOGY | Facility: CLINIC | Age: 83
End: 2023-06-19
Payer: MEDICARE

## 2023-06-19 ENCOUNTER — TELEPHONE (OUTPATIENT)
Dept: ELECTROPHYSIOLOGY | Facility: CLINIC | Age: 83
End: 2023-06-19
Payer: MEDICARE

## 2023-06-19 DIAGNOSIS — I48.0 PAROXYSMAL ATRIAL FIBRILLATION: ICD-10-CM

## 2023-06-19 PROCEDURE — 93005 ELECTROCARDIOGRAM TRACING: CPT | Mod: PBBFAC | Performed by: INTERNAL MEDICINE

## 2023-06-19 PROCEDURE — 93010 ELECTROCARDIOGRAM REPORT: CPT | Mod: S$PBB,,, | Performed by: INTERNAL MEDICINE

## 2023-06-19 PROCEDURE — 93010 RHYTHM STRIP: ICD-10-PCS | Mod: S$PBB,,, | Performed by: INTERNAL MEDICINE

## 2023-06-19 NOTE — TELEPHONE ENCOUNTER
Spoke with patient and relayed Dr Lagunas's recommendation to stop the metoprolol and just remain on the Flecainide. He verbalized understanding and will update us towards the end of the week.

## 2023-06-19 NOTE — TELEPHONE ENCOUNTER
Please review rhythm strip. He is s/p DCCV on 6/13/23. His only complaint is extreme fatigue. Current meds: metoprolol 12.5mg daily and flecainide 100mg bid. Please advise.  Thanks

## 2023-06-19 NOTE — ADDENDUM NOTE
Addended by: LAURO PEARCE on: 6/19/2023 01:55 PM     Modules accepted: Orders     Take tylenol and or ibuprofen for pain. Follow dosing on package.   Take Tramadol for pain not helped by tylenol and ibuprofen. Do not drive or participate in activities that require alertness when taking.   Elevate left foot as much as able.   Apply ice to left foot for 20 minutes every 1-2 hours. Protect skin.   Wear ortho shoe and ace wrap when walking.   Follow up with PCP in 10 days for re-evaluation.   Return to urgent care or emergency department with any increase in symptoms or concerns.

## 2023-06-22 ENCOUNTER — PATIENT MESSAGE (OUTPATIENT)
Dept: ELECTROPHYSIOLOGY | Facility: CLINIC | Age: 83
End: 2023-06-22
Payer: MEDICARE

## 2023-06-22 ENCOUNTER — PATIENT MESSAGE (OUTPATIENT)
Dept: INTERNAL MEDICINE | Facility: CLINIC | Age: 83
End: 2023-06-22
Payer: MEDICARE

## 2023-06-22 DIAGNOSIS — D50.9 IRON DEFICIENCY ANEMIA, UNSPECIFIED IRON DEFICIENCY ANEMIA TYPE: Primary | ICD-10-CM

## 2023-06-22 DIAGNOSIS — E53.8 B12 DEFICIENCY: ICD-10-CM

## 2023-06-25 ENCOUNTER — PATIENT MESSAGE (OUTPATIENT)
Dept: INTERNAL MEDICINE | Facility: CLINIC | Age: 83
End: 2023-06-25
Payer: MEDICARE

## 2023-06-27 ENCOUNTER — LAB VISIT (OUTPATIENT)
Dept: LAB | Facility: HOSPITAL | Age: 83
End: 2023-06-27
Attending: INTERNAL MEDICINE
Payer: COMMERCIAL

## 2023-06-27 DIAGNOSIS — E53.8 B12 DEFICIENCY: ICD-10-CM

## 2023-06-27 DIAGNOSIS — D50.9 IRON DEFICIENCY ANEMIA, UNSPECIFIED IRON DEFICIENCY ANEMIA TYPE: ICD-10-CM

## 2023-06-27 LAB
BASOPHILS # BLD AUTO: 0.06 K/UL (ref 0–0.2)
BASOPHILS NFR BLD: 0.8 % (ref 0–1.9)
DIFFERENTIAL METHOD: ABNORMAL
EOSINOPHIL # BLD AUTO: 0.3 K/UL (ref 0–0.5)
EOSINOPHIL NFR BLD: 3.9 % (ref 0–8)
ERYTHROCYTE [DISTWIDTH] IN BLOOD BY AUTOMATED COUNT: 15.6 % (ref 11.5–14.5)
FERRITIN SERPL-MCNC: 52 NG/ML (ref 20–300)
HCT VFR BLD AUTO: 45.7 % (ref 40–54)
HGB BLD-MCNC: 14.7 G/DL (ref 14–18)
IMM GRANULOCYTES # BLD AUTO: 0.01 K/UL (ref 0–0.04)
IMM GRANULOCYTES NFR BLD AUTO: 0.1 % (ref 0–0.5)
IRON SERPL-MCNC: 86 UG/DL (ref 45–160)
LYMPHOCYTES # BLD AUTO: 1.8 K/UL (ref 1–4.8)
LYMPHOCYTES NFR BLD: 22.4 % (ref 18–48)
MCH RBC QN AUTO: 29.3 PG (ref 27–31)
MCHC RBC AUTO-ENTMCNC: 32.2 G/DL (ref 32–36)
MCV RBC AUTO: 91 FL (ref 82–98)
MONOCYTES # BLD AUTO: 0.5 K/UL (ref 0.3–1)
MONOCYTES NFR BLD: 6.4 % (ref 4–15)
NEUTROPHILS # BLD AUTO: 5.3 K/UL (ref 1.8–7.7)
NEUTROPHILS NFR BLD: 66.4 % (ref 38–73)
NRBC BLD-RTO: 0 /100 WBC
PLATELET # BLD AUTO: 253 K/UL (ref 150–450)
PMV BLD AUTO: 10.6 FL (ref 9.2–12.9)
RBC # BLD AUTO: 5.02 M/UL (ref 4.6–6.2)
SATURATED IRON: 21 % (ref 20–50)
TOTAL IRON BINDING CAPACITY: 414 UG/DL (ref 250–450)
TRANSFERRIN SERPL-MCNC: 280 MG/DL (ref 200–375)
VIT B12 SERPL-MCNC: 539 PG/ML (ref 210–950)
WBC # BLD AUTO: 7.99 K/UL (ref 3.9–12.7)

## 2023-06-27 PROCEDURE — 85025 COMPLETE CBC W/AUTO DIFF WBC: CPT | Performed by: INTERNAL MEDICINE

## 2023-06-27 PROCEDURE — 82607 VITAMIN B-12: CPT | Performed by: INTERNAL MEDICINE

## 2023-06-27 PROCEDURE — 36415 COLL VENOUS BLD VENIPUNCTURE: CPT | Performed by: INTERNAL MEDICINE

## 2023-06-27 PROCEDURE — 82728 ASSAY OF FERRITIN: CPT | Performed by: INTERNAL MEDICINE

## 2023-06-27 PROCEDURE — 84466 ASSAY OF TRANSFERRIN: CPT | Performed by: INTERNAL MEDICINE

## 2023-06-29 ENCOUNTER — PATIENT MESSAGE (OUTPATIENT)
Dept: INTERNAL MEDICINE | Facility: CLINIC | Age: 83
End: 2023-06-29
Payer: MEDICARE

## 2023-07-02 RX ORDER — APIXABAN 5 MG/1
TABLET, FILM COATED ORAL
Qty: 60 TABLET | Refills: 1 | Status: SHIPPED | OUTPATIENT
Start: 2023-07-02 | End: 2023-11-01

## 2023-07-11 ENCOUNTER — TELEPHONE (OUTPATIENT)
Dept: ENDOSCOPY | Facility: HOSPITAL | Age: 83
End: 2023-07-11
Payer: MEDICARE

## 2023-07-11 NOTE — TELEPHONE ENCOUNTER
----- Message from Estee Zavaleta sent at 7/11/2023 11:24 AM CDT -----  Type:  Needs Medical Advice    Who Called: pt    Would the patient rather a call back or a response via [facility]? call  Best Call Back Number: [phone]  Additional Information: pt is requesting to get a return call he is following up on some results from 05/12/23 and the stated that they had to be resent  for further testing

## 2023-07-11 NOTE — TELEPHONE ENCOUNTER
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 (CMS/HCC)   • Acute blood loss anemia   • Acute GI bleeding   • Paroxysmal atrial fibrillation (CMS/HCC)   • Stage 3 chronic kidney disease (CMS/HCC)   • Anticoagulant-induced bleeding (CMS/HCC)   • Lung mass   • Malignant neoplasm of upper lobe of left lung (CMS/HCC)   • Thrombocytopenia (CMS/HCC)   • Acute cystitis without hematuria   • Coronary artery disease involving native coronary artery of native heart without angina pectoris   • Elevated troponin I level   • Acute kidney injury superimposed on CKD (CMS/HCC)   • BPH (benign prostatic hyperplasia)   • Abnormal CT of the abdomen   • Leukemoid reaction   • Other cirrhosis of liver (CMS/HCC)   • Near syncope   • Fall at home   • Age-related physical debility   • Atypical chest pain   • Pericardial effusion   • Severe sepsis (CMS/HCC)   • Abnormal perfusion scan of lung      Patient is alert and talkative today. He is feeling better.  Abdominal distention still present.   Plan for surgical evaluation of ileus. No meds by mouth    Review of Systems  12 systems reviewed and positives as above, no other symptoms    Objective:     Medications:  • heparin (porcine)  5,000 Units Subcutaneous 3 times per day   • ceFAZolin  2,000 mg Intravenous 3 times per day    And   • metroNIDAZOLE (FLAGYL) IVPB  500 mg Intravenous  Left message   3 times per day   • sodium chloride (PF)  2 mL Intracatheter 2 times per day   • Potassium Standard Replacement Protocol   Does not apply See Admin Instructions   • Magnesium Standard Replacement Protocol   Does not apply See Admin Instructions   • AMIODarone  200 mg Oral Daily   • pantoprazole  40 mg Intravenous 2 times per day   • metoPROLOL  2.5 mg Intravenous 4 times per day   • sodium chloride  500 mL Intravenous Once      • propofol (DIPRIVAN) infusion     • fentaNYL (SUBLIMAZE) 2,500 mcg/250 mL in sodium chloride 0.9 % infusion 25 mcg/hr (08/09/21 1813)   • dextrose 5 % / sodium chloride 0.45% infusion 75 mL/hr at 08/09/21 1059      morphine injection, melatonin, sodium chloride, ondansetron, prochlorperazine, acetaminophen, docusate sodium-sennosides, aluminum-magnesium hydroxide-simethicone, sodium chloride, morphine injection, metoPROLOL, haloperidol lactate, LORazepam     Allergies:   ALLERGIES:  No Known Allergies     Physical Exam:  Vital Last Value 24 Hour Range   Temperature 97.5 °F (36.4 °C) (08/09/21 0800) Temp  Min: 97.5 °F (36.4 °C)  Max: 97.9 °F (36.6 °C)   Pulse 62 (08/09/21 1300) Pulse  Min: 62  Max: 75   Respiratory 20 (08/09/21 1300) Resp  Min: 11  Max: 20   Non-Invasive  Blood Pressure 117/67 (08/09/21 1300) BP  Min: 106/55  Max: 123/61   Pulse Oximetry 95 % (08/09/21 1300) SpO2  Min: 94 %  Max: 97 %   Arterial   Blood Pressure   No data recorded     Intake/Output:     Intake/Output Summary (Last 24 hours) at 8/9/2021 1915  Last data filed at 8/9/2021 1542  Gross per 24 hour   Intake 1850.22 ml   Output 1100 ml   Net 750.22 ml       Weight    08/06/21 1157 08/09/21 0200 08/09/21 0600   Weight: 78.9 kg (174 lb) 73.4 kg (161 lb 13.1 oz) 73 kg (161 lb)        GENERAL: No apparent distress  HEENT: Normocephalic.  Neck:  Supple neck.   Oral mucosa : Pink and moist.    Endocrine: There is no goiter.  CVS: Regular rate and rhythm.  Normal first and second heart tones.   Lung fields: Clear to  auscultation bilaterally.   GI: Soft. Nontender, distended.  pericardial drain in place with bag to dependent drainage  Lower extremity: No cyanosis, clubbing or edema.   Peripheral vascular: Both lower extremities are warm and well perfused.    Neuro: Awake and alert.  Nonfocal examination.  Psych: Appropriate mood and affect  Integumentary: Warm and Dry    Medical Decision Making:   (PERSONALLY REVIEWED)    Tele:     Labs    CBC  Recent Labs   Lab 08/09/21  1020 08/08/21  0527 08/07/21  0322   WBC 5.0 5.5 7.3  7.3   HCT 33.7* 32.5* 38.1*  38.1*   HGB 11.1* 10.7* 12.5*  12.5*   * 141 137*  137*       CMP  Recent Labs   Lab 08/09/21  1020 08/08/21  1055 08/07/21 0322 08/06/21  0228   SODIUM 142 141 140 134*   POTASSIUM 3.5 3.7 4.7 4.4   CHLORIDE 110* 109* 106 101   CO2 25 24 27 21   GLUCOSE 112* 105* 107* 174*   BUN 22* 29* 30* 27*   CREATININE 1.64* 1.79* 2.13* 1.99*   CALCIUM 8.2* 8.3* 8.9 8.8   TOTPROTEIN 6.1*  --  7.2 7.7   ALBUMIN 2.1*  --  2.5* 2.9*   BILIRUBIN 1.0  --  1.3* 2.0*   AST 17  --  25 20   GPT 11  --  18 13   ALKPT 91  --  92 101       Cardiac Labs  Recent Labs   Lab 08/06/21 0228 08/05/21  1519 08/05/21  0945   RAPDTR <0.02 <0.02 <0.02   NTPROB  --   --  6,590*       Lipid Panel  No results found    Coags  Recent Labs   Lab 08/09/21  1020 08/08/21  0527 08/07/21  0322 08/05/21  1519   INR  --   --   --  1.1   PTT 29 30 24 31*       ABG  No results found    Imaging    ECG:   Encounter Date: 08/05/21   Electrocardiogram 12-Lead   Result Value    Ventricular Rate EKG/Min (BPM) 113    Atrial Rate (BPM) 113    TN-Interval (MSEC) 184    QRS-Interval (MSEC) 128    QT-Interval (MSEC) 344    QTc 472    P Axis (Degrees) 8    R Axis (Degrees) -51    T Axis (Degrees) 71    REPORT TEXT      Sinus tachycardia  Left axis deviation  Nonspecific intraventricular block  Abnormal ECG  When compared with ECG of  17-JUL-2021 03:46,  No significant change was found  Confirmed by YAKELIN HERNANDEZ, FRANCES ([id number]) on  8/6/2021 2:25:00 PM          Echocardiogram:    --------------------------------------------------------------------------  INDICATIONS:   Hypertensive heart disease.    --------------------------------------------------------------------------  STUDY CONCLUSIONS  SUMMARY:    1. Left ventricle: The cavity size is mildly dilated. Wall thickness is  mildly increased. The ejection fraction was measured by 3D assessment.  Doppler parameters are consistent with abnormal left ventricular  relaxation (grade 1 diastolic dysfunction). The ejection fraction is  47%.  2. Regional wall motion abnormalities: Moderate hypokinesis of the entire  inferior myocardium; mild hypokinesis of the basal-mid inferoseptal and  apical septal myocardium.  3. Mitral valve: Moderate regurgitation.  4. Left atrium: The atrium is mildly dilated.  5. Tricuspid valve: Mild regurgitation.    --------------------------------------------------------------------------      Imaging Last 24hrs :  XR ABDOMEN 1 VIEW    Result Date: 8/6/2021  EXAM: XR ABDOMEN 1 VIEW CLINICAL INDICATION: NG tube placement COMPARISON: Plain film of the abdomen from approximately 10-12 hours prior.,  CT abdomen and pelvis from the prior day     FINDINGS/IMPRESSION:  Enteric tube tip terminates over the projection of the gastric body. Left lower lobe and cardiac findings better appreciated on prior CT abdomen and pelvis.  Please refer to prior report Left staghorn calculus is redemonstrated. Redemonstrated markedly prominent loops of bowel predominantly within the right hemiabdomen.  Please refer to prior CT report for further findings. Electronically Signed by: ROMAN CHAPMAN D.O. Signed on: 8/6/2021 1:10 PM     XR CHEST PA OR AP 1 VIEW    Result Date: 8/7/2021  EXAM: AP portable supine chest INDICATION NG tube placement, abnormal x-ray follow-up  Comparing x-ray from 8/5/2021. Comparing CT abdomen 8/5/2021.     FINDINGS with IMPRESSION:  Tip of new alimentary tube in  projection of stomach. New pericardial drain projects over the left chest and right upper abdomen. Other support apparatus overlies the chest.  Redemonstrated prominent gassy large bowel projecting beneath the right diaphragm. Mild right diaphragm elevation. Right lung clear. Unchanged hazy opacity across left lower lung from pleural effusion and infiltrate, perhaps pneumonia. Surgical staples at left hilum. Cardiac silhouette unchanged. Healed left rib fractures. Electronically Signed by: TERESE JOHNS M.D. Signed on: 8/7/2021 6:02 AM         Stress test  No results found for this or any previous visit.    Stress Test with Myocardial Perfusion    Rest/Stress    Patient: Lauri Limon    Study Date/Time:   Apr 20 2021 11:00AM  -----------------------------------------------------------------  Study Conclusions    Summary:    1. Myocardial perfusion imaging: Left ventricular size is normal. There is  no transient ischemic dilation of the left ventricle during stress.  There is a large, mild, fixed defect involving the basal and mid  inferoseptal, basal, mid, and apical inferior, basal and mid  inferolateral, basal anterolateral, and apical wall(s), consistent with  infarction in the distribution of multiple vessels. The SSS was 11, SRS  11, and SDS was 0. This correlates with 10-14% of the myocardial  ischemic burden. There was increased GI uptake of radioactive tracer  during rest/stress acquisition.  2. Gated SPECT: The calculated left ventricular ejection fraction is 50%.  No left ventricular regional motion abnormality.  3. Stress ECG conclusions: There are rare pacs noted during stress. LBBB  persisted. No chest pain noted.  4. Baseline ECG: Normal sinus rhythm with left bundle branch block.  Impressions:    1. Large, mild-moderate, fixed inferoapical, inferior, inferoseptal, and  inferolateral perfusion defects.  2. No clear scintigraphic evidence of reversible ischemia.  3. Calculated EF 50%.  4.  Uninterpretable ecg for ischemia due to LBBB.  5. Risk of ischemia/infarction: Probably low.    --------------------------------------------------------------------------      Cardiac cath:   Results for orders placed or performed during the hospital encounter of 08/05/21   Cath/PV Case    Narrative    A total of 600 cc of yellow serosanguineous fluid was withdrawn.  Fluid   was sent for protein, LDH, and for cytology and for microbiology.  Echo   was performed at the end of the procedure and confirmed resolution of   pericardial effusion.    The drain was sutured in place, with the back for continued drainage.  The   total volume of the back at the end of the procedure was 500 mL.       Assessment and Plan:      Pericardial effusion  -Was noted on CT scan which was obtained for evaluation of his abdomen  -Cath lab removal of 600cc yellow serosanguineous fluid  -During his last admission he was started on hydralazine, this raises the concern of possible drug-induced lupus, hydralazine will be held for now.  -With history of lung cancer resection, if there is a new effusion concern for possible malignant etiology,  cytology assessment pending   -Effusion decreased, with limited output over 24 hours will remove drain    Cardiomyopathy  -Continue guideline directed medical therapy with beta-blocker, reassess long-term options once the patient recovers from his abdominal distention  -holding ACE/ARB and spironolactone due to CKD  -holding bidil due to possible hydralazine-induced pericardial effusion    Hypertension  -Blood pressure is currently controlled  -He is not getting his metoprolol or amiodarone now as he is n.p.o.    Paroxysmal atrial fibrillation  -Patient has a history of atrial fibrillation and was on Eliquis up until sometime last year, then it was held because of his need for lung cancer surgery and was never resumed  -He also had a GI bleed while on Eliquis, its not clear that he ever had an evaluation  for that  -His chads-risk score is 5, high risk for thromboembolic complications.  Would recommend resumption of oral anticoagulation when stable prior to discharge  - If  there are definite contraindications, or consideration of a watchman device  -Resume amiodarone once he can take p.o.  -IV Lopressor will be given for heart rate control    Hyperlipidemia  -Resume pravastatin once the patient can take p.o.     Coronary artery disease  -Reportedly had a history of silent myocardial infarction  -Recent Lexiscan and echo are consistent with inferior infarction but without significant reversible ischemia  -moderate risk for surgical intervention. No further risk stratifcation. Continue perioperative BB for HR < 100 and normotension     Chronic kidney disease  -Creatinine is improved compared to July     Bowel obstruction  -Patient is currently on NG suction  -Evaluation and management as per GI and surgery  -may proceed to surgery today        Thank you for allowing us to participate in this patient's care.   Please do not hesitate to call with any questions or concerns.      Solo Bush MD

## 2023-07-17 ENCOUNTER — TELEPHONE (OUTPATIENT)
Dept: ENDOSCOPY | Facility: HOSPITAL | Age: 83
End: 2023-07-17
Payer: MEDICARE

## 2023-07-17 NOTE — TELEPHONE ENCOUNTER
----- Message from Ajay Swain MD sent at 7/15/2023  1:46 PM CDT -----  Contact: [phone]  no  ----- Message -----  From: Indiana Camp MA  Sent: 7/14/2023   3:02 PM CDT  To: Ajay Swain MD    Does he need an EGD sooner than November?  ----- Message -----  From: Machelle Dye RN  Sent: 7/14/2023   1:00 PM CDT  To: Indiana Camp MA      ----- Message -----  From: Lawernce Simpson  Sent: 7/14/2023  12:58 PM CDT  To: Sushma ALMEIDA Staff    Adrian Healy calling regarding Patient Advice (message) pt return a miss call from Jael asking if she can give him a call back

## 2023-08-02 DIAGNOSIS — I48.0 PAROXYSMAL ATRIAL FIBRILLATION: Primary | ICD-10-CM

## 2023-08-03 RX ORDER — FLECAINIDE ACETATE 100 MG/1
100 TABLET ORAL EVERY 12 HOURS
Qty: 180 TABLET | Refills: 3 | Status: SHIPPED | OUTPATIENT
Start: 2023-08-03

## 2023-08-07 ENCOUNTER — HOSPITAL ENCOUNTER (OUTPATIENT)
Dept: CARDIOLOGY | Facility: CLINIC | Age: 83
Discharge: HOME | End: 2023-08-07
Payer: MEDICARE

## 2023-08-07 ENCOUNTER — OFFICE VISIT (OUTPATIENT)
Dept: ELECTROPHYSIOLOGY | Facility: CLINIC | Age: 83
End: 2023-08-07
Payer: MEDICARE

## 2023-08-07 VITALS
HEIGHT: 73 IN | SYSTOLIC BLOOD PRESSURE: 128 MMHG | WEIGHT: 217.38 LBS | HEART RATE: 70 BPM | BODY MASS INDEX: 28.81 KG/M2 | DIASTOLIC BLOOD PRESSURE: 71 MMHG

## 2023-08-07 DIAGNOSIS — H90.3 SENSORINEURAL HEARING LOSS (SNHL) OF BOTH EARS: ICD-10-CM

## 2023-08-07 DIAGNOSIS — E66.3 OVERWEIGHT (BMI 25.0-29.9): ICD-10-CM

## 2023-08-07 DIAGNOSIS — K21.9 GASTROESOPHAGEAL REFLUX DISEASE, UNSPECIFIED WHETHER ESOPHAGITIS PRESENT: ICD-10-CM

## 2023-08-07 DIAGNOSIS — I10 PRIMARY HYPERTENSION: ICD-10-CM

## 2023-08-07 DIAGNOSIS — I48.0 PAROXYSMAL ATRIAL FIBRILLATION: ICD-10-CM

## 2023-08-07 DIAGNOSIS — I48.19 PERSISTENT ATRIAL FIBRILLATION: Primary | ICD-10-CM

## 2023-08-07 DIAGNOSIS — F41.9 ANXIETY: ICD-10-CM

## 2023-08-07 DIAGNOSIS — E78.2 MIXED HYPERLIPIDEMIA: ICD-10-CM

## 2023-08-07 PROCEDURE — 99214 OFFICE O/P EST MOD 30 MIN: CPT | Mod: S$PBB,,, | Performed by: STUDENT IN AN ORGANIZED HEALTH CARE EDUCATION/TRAINING PROGRAM

## 2023-08-07 PROCEDURE — 93010 ELECTROCARDIOGRAM REPORT: CPT | Mod: S$PBB,,, | Performed by: INTERNAL MEDICINE

## 2023-08-07 PROCEDURE — 93005 ELECTROCARDIOGRAM TRACING: CPT | Mod: PBBFAC | Performed by: INTERNAL MEDICINE

## 2023-08-07 PROCEDURE — 93010 RHYTHM STRIP: ICD-10-PCS | Mod: S$PBB,,, | Performed by: INTERNAL MEDICINE

## 2023-08-07 PROCEDURE — 99214 PR OFFICE/OUTPT VISIT, EST, LEVL IV, 30-39 MIN: ICD-10-PCS | Mod: S$PBB,,, | Performed by: STUDENT IN AN ORGANIZED HEALTH CARE EDUCATION/TRAINING PROGRAM

## 2023-08-07 PROCEDURE — 99999 PR PBB SHADOW E&M-EST. PATIENT-LVL II: ICD-10-PCS | Mod: PBBFAC,,, | Performed by: STUDENT IN AN ORGANIZED HEALTH CARE EDUCATION/TRAINING PROGRAM

## 2023-08-07 PROCEDURE — 99212 OFFICE O/P EST SF 10 MIN: CPT | Mod: PBBFAC | Performed by: STUDENT IN AN ORGANIZED HEALTH CARE EDUCATION/TRAINING PROGRAM

## 2023-08-07 PROCEDURE — 99999 PR PBB SHADOW E&M-EST. PATIENT-LVL II: CPT | Mod: PBBFAC,,, | Performed by: STUDENT IN AN ORGANIZED HEALTH CARE EDUCATION/TRAINING PROGRAM

## 2023-08-07 NOTE — PROGRESS NOTES
"Electrophysiology Clinic Note    Reason for follow-up patient visit: Ongoing evaluation and recommendations regarding  persistent atrial fibrillation, s/p successful SOILA and cardioversion on 6/12/2023.     PRESENTING HISTORY:     History of Present Illness:  Mr. Adrian Healy is a alicia 83-year-old gentleman who returns to clinic today for ongoing evaluation and recommendations regarding a persistent atrial fibrillation, s/p successful SOILA and cardioversion on 6/12/2023. He has a past medical history significant for persistent atrial fibrillation noted during an outpatient EGD, s/p successful SOILA and cardioversion on 6/12/2023, GERD with a history of gastric polyps, hypertension, hyperlipidemia, hearing loss, and overweight BMI. Following his cardioversion he was initiated on flecainide 100mg po BID in addition to uninterrupted oral anticoagulation with apixaban, and remains in sinus rhythm on assessment today.     In brief review of his atrial fibrillation history, he presented for an outpatient EGD to monitor his gastric polyps on 5/1/2023. Per chart review, he was noted to be in rate-controlled atrial fibrillation. He was not told that he was in atrial fibrillation at his prior colonoscopy about one month prior in April 2023; however, he does not think they obtained a 12-lead ECG. While in the PACU, he reported that he was asymptomatic. He was subsequently seen in general cardiology with Naye Pillai and Samara on 5/23/2023. At that encounter, he remained in rate-controlled atrial fibrillation. He was started on oral anticoagulation with apixaban and denies any adverse bleeding events. AT our prior encounter, we discussed undergoing a SOILA and cardioversion for restoration of sinus rhythm. This was successfully performed on 6/12/2023 with initiation of flecainide 100mg po BID in addition to continued oral anticoagulation with apixaban. Since undergoing his cardioversion, he reports that he feels "much better", " "and that he has increased exercise tolerance and improved shortness of breath. He remains in sinus rhythm on assessment today.      He presents to clinic today with his wife. In discussion with Mr. Healy and his wife today, they tell me that he is feeling overall significantly improved. He denies any episodes of dizziness, lightheadedness, syncope/presyncope, chest pain or chest discomfort, nausea or vomiting, orthopnea, or PND. He reports that when he was in atrial fibrillation, he felt mild exercise intolerance, especially noted with household chores such as mowing his lawn. He was having to take frequent breaks, which was a new symptom for him. He reported very brief "chest flutters" that would last for a matter of seconds. His baseline shortness of breath was worse while in atrial fibrillation. Since undergoing cardioversion he has been able to resume his baseline physical activity without limitation. He can climb more than one flight of stairs prior to needing to take a break, and usually is able to ride his stationary bicycle for several miles without limitation.     Review of Systems:  Review of Systems   Constitutional:  Negative for activity change.        Mild exercise intolerance while in atrial fibrillation.    HENT:  Negative for nasal congestion, nosebleeds, postnasal drip, rhinorrhea, sinus pressure/congestion, sneezing and sore throat.    Respiratory:  Positive for shortness of breath. Negative for apnea, cough, chest tightness and wheezing.    Cardiovascular:  Negative for chest pain, palpitations and leg swelling.   Gastrointestinal:  Negative for abdominal distention, abdominal pain, blood in stool, change in bowel habit, constipation, diarrhea, nausea, vomiting and change in bowel habit.   Genitourinary:  Negative for dysuria and hematuria.   Musculoskeletal:  Positive for arthralgias and back pain. Negative for gait problem.   Neurological:  Negative for dizziness, seizures, syncope, weakness, " light-headedness, headaches, coordination difficulties and coordination difficulties.        PAST HISTORY:     Past Medical History:   Diagnosis Date    GERD (gastroesophageal reflux disease)     HEARING LOSS     Hyperlipidemia     Hypertension        Past Surgical History:   Procedure Laterality Date    COLONOSCOPY N/A 7/19/2018    Procedure: COLONOSCOPY;  Surgeon: Abilio Ferrara MD;  Location: [facility] ENDO (4TH FLR);  Service: Endoscopy;  Laterality: N/A;    COLONOSCOPY N/A 4/27/2022    Procedure: COLONOSCOPY;  Surgeon: Rudy Grace MD;  Location: [facility] ENDO (4TH FLR);  Service: Endoscopy;  Laterality: N/A;  fully vaccinated    ECHOCARDIOGRAM,TRANSESOPHAGEAL N/A 6/12/2023    Procedure: Transesophageal echo (SOILA) intra-procedure log documentation;  Surgeon: [facility] Diagnostic Provider;  Location: [facility] EP LAB;  Service: Cardiology;  Laterality: N/A;    ESOPHAGOGASTRODUODENOSCOPY N/A 3/21/2023    Procedure: ESOPHAGOGASTRODUODENOSCOPY (EGD);  Surgeon: Wilmar Ordaz MD;  Location: [facility] ENDO (4TH FLR);  Service: Endoscopy;  Laterality: N/A;  inst portal-tb    ESOPHAGOGASTRODUODENOSCOPY N/A 5/1/2023    Procedure: EGD (ESOPHAGOGASTRODUODENOSCOPY);  Surgeon: Ajay Swain MD;  Location: [facility] ENDO (2ND FLR);  Service: Endoscopy;  Laterality: N/A;  4/13/23-Instructions via portal-DS  4/25 pre call no answer    EYE SURGERY      TREATMENT OF CARDIAC ARRHYTHMIA N/A 6/12/2023    Procedure: Cardioversion or Defibrillation;  Surgeon: SHARI Lagunas MD;  Location: [facility] EP LAB;  Service: Cardiology;  Laterality: N/A;  AF, SOILA, DCCV, MAC, EH, 3 Prep       Family History:  Family History   Problem Relation Age of Onset    Liver cancer Sister     Melanoma Sister     Cancer Brother     Heart disease Brother         pacemaker    Colon cancer Brother     Prostate cancer Brother        Social History:  He  reports that he quit smoking about 25 years ago. His smoking use included cigarettes. He started smoking about 55 years  "ago. He has a 30.0 pack-year smoking history. He quit smokeless tobacco use about 25 years ago. He reports current alcohol use of about 5.8 standard drinks of alcohol per week. He reports that he does not use drugs.      MEDICATIONS & ALLERGIES:     Review of patient's allergies indicates:  No Known Allergies    Current Outpatient Medications on File Prior to Visit   Medication Sig Dispense Refill    amLODIPine (NORVASC) 5 MG tablet Take 1 tablet (5 mg total) by mouth once daily. 90 tablet 3    apixaban (ELIQUIS) 5 mg Tab Take 1 tablet (5 mg total) by mouth 2 (two) times daily. 60 tablet 1    atorvastatin (LIPITOR) 10 MG tablet Take 1 tablet (10 mg total) by mouth once daily. 90 tablet 3    cetirizine (ZYRTEC) 5 MG tablet Take 1 tablet (5 mg total) by mouth once daily. 90 tablet 3    citalopram (CELEXA) 20 MG tablet Take 1 tablet (20 mg total) by mouth once daily. 90 tablet 3    ELIQUIS 5 mg Tab TAKE 1 TABLET BY MOUTH TWICE A DAY 60 tablet 1    ferrous sulfate (FEOSOL) 325 mg (65 mg iron) Tab tablet Take 1 tablet (325 mg total) by mouth once daily. 30 tablet 3    flecainide (TAMBOCOR) 100 MG Tab TAKE 1 TABLET BY MOUTH EVERY 12 HOURS 180 tablet 3    hydrocortisone 2.5 % cream Apply topically 2 (two) times daily. 20 g 3    omeprazole (PRILOSEC) 40 MG capsule Take 1 capsule (40 mg total) by mouth once daily. 90 capsule 3     No current facility-administered medications on file prior to visit.        OBJECTIVE:     Vital Signs:  /71   Pulse 70   Ht 6' 1" (1.854 m)   Wt 98.6 kg (217 lb 6 oz)   BMI 28.68 kg/m²     Physical Exam:  Physical Exam  Constitutional:       General: He is not in acute distress.     Appearance: Normal appearance. He is not ill-appearing or diaphoretic.      Comments: Well-appearing man in NAD.   HENT:      Head: Normocephalic and atraumatic.      Nose: Nose normal.      Mouth/Throat:      Mouth: Mucous membranes are moist.      Pharynx: Oropharynx is clear.   Eyes:      Pupils: Pupils " are equal, round, and reactive to light.   Cardiovascular:      Rate and Rhythm: Normal rate and regular rhythm.      Pulses: Normal pulses.      Heart sounds: Normal heart sounds. No murmur heard.     No friction rub. No gallop.   Pulmonary:      Effort: Pulmonary effort is normal. No respiratory distress.      Breath sounds: Normal breath sounds. No wheezing, rhonchi or rales.   Chest:      Chest wall: No tenderness.   Abdominal:      General: There is no distension.      Palpations: Abdomen is soft.      Tenderness: There is no abdominal tenderness.   Musculoskeletal:         General: No swelling or tenderness.      Cervical back: Normal range of motion.      Right lower leg: No edema.      Left lower leg: No edema.   Skin:     General: Skin is warm and dry.      Findings: No erythema, lesion or rash.   Neurological:      General: No focal deficit present.      Mental Status: He is alert and oriented to person, place, and time. Mental status is at baseline.      Motor: No weakness.      Gait: Gait normal.   Psychiatric:         Mood and Affect: Mood normal.         Behavior: Behavior normal.          Laboratory Data:  Lab Results   Component Value Date    WBC 7.99 06/27/2023    HGB 14.7 06/27/2023    HCT 45.7 06/27/2023    MCV 91 06/27/2023     06/27/2023     Lab Results   Component Value Date    GLU 88 06/09/2023     06/09/2023    K 4.3 06/09/2023     06/09/2023    CO2 26 06/09/2023    BUN 14 06/09/2023    CREATININE 1.0 06/09/2023    CALCIUM 9.5 06/09/2023    MG 1.7 05/01/2023     Lab Results   Component Value Date    INR 1.1 06/09/2023       Pertinent Cardiac Data:  ECG: Normal sinus rhythm with first degree AV block, rate of 70 bpm,  ms,  ms, QT/QTc 404/436 ms, nonspecific STT changes.     Resting 2D Transthoracic Echocardiogram - 6/9/2023:  The left ventricle is normal in size with mildly decreased systolic function. The estimated ejection fraction is 50%.  Mild right  ventricular enlargement with normal right ventricular systolic function.  Severe left atrial enlargement.  The estimated PA systolic pressure is 24 mmHg.  Normal central venous pressure (3 mmHg).  Atrial fibrillation observed.    SOILA - 6/12/2023:  SOILA performed prior to DCCV.  Normal appearing left atrial appendage. No thrombus is present in the appendage. Abnormal appendage velocities.  The left ventricle is normal in size with normal systolic function.  The estimated ejection fraction is 50%.  Normal right ventricular size with normal right ventricular systolic function.  Mild mitral regurgitation.  Grade 2 plaque present.      ASSESSMENT & PLAN:   Mr. Adrian Heayl is a alicia 83-year-old gentleman who returns to clinic today for ongoing evaluation and recommendations regarding a persistent atrial fibrillation, s/p successful SOILA and cardioversion on 6/12/2023. He has a past medical history significant for persistent atrial fibrillation noted during an outpatient EGD, s/p successful SOILA and cardioversion on 6/12/2023, GERD with a history of gastric polyps, hypertension, hyperlipidemia, hearing loss, and overweight BMI. Following his cardioversion he was initiated on flecainide 100mg po BID in addition to uninterrupted oral anticoagulation with apixaban, and remains in sinus rhythm on assessment today.     - We discussed the pathophysiology of atrial fibrillation; specifically, we discussed paroxysmal atrial fibrillation as it differs from persistent atrial fibrillation. We discussed that the longer a patient remains in atrial fibrillation, the more challenging it may be for the patient to return and sustain sinus rhythm. We discussed that atrial fibrillation has an increased risk of CVA.    - He feels that since undergoing his successful cardioversion that he has remained in sinus rhythm and reports that he feel significantly improved. He remains in sinus rhythm on all subsequent ECGs since his cardioversion.    - He remains on flecainide 100mg po BID with excellent control of his atrial fibrillation. Jermain suppression has not been added in the setting of asymptomatic bradycardia. He has no history of underlying coronary artery disease, although this has not been formally assessed. His baseline QRSd remains narrow at 112ms. He has preserved renal function. His systolic function remains preserved with low-normal LVEF 50%.   - His ANR1YS3-WCOq is 3 (HTN, male gender, age greater than 75), portending an annual adjusted risk of CVA of 3.2%. He remains on uninterrupted apixaban 5 mg po BID with no adverse bleeding events reported.   - Possible underlying drivers of atrial fibrillation were addressed at this appointment, including recommendations for maintenance of a healthy BMI - now a class I recommendation. Review of laboratory data reveals acceptable TSH at 0.939. We discussed the role that obstructive sleep apnea may play in atrial fibrillation. A sleep study may be considered at a future encounter to evaluate for possible JACOBO.       This patient will return to clinic with me in six months with continued flecainide antiarrhythmic therapy and apixaban oral anticoagulation. All questions and concerns were addressed at this encounter.     Signing Physician:       PAULINE Lagunas MD  Electrophysiology Attending

## 2023-08-11 PROBLEM — E66.3 OVERWEIGHT (BMI 25.0-29.9): Status: ACTIVE | Noted: 2023-08-11

## 2023-09-05 DIAGNOSIS — K31.7 GASTRIC POLYP: Primary | ICD-10-CM

## 2023-10-17 ENCOUNTER — TELEPHONE (OUTPATIENT)
Dept: ENDOSCOPY | Facility: HOSPITAL | Age: 83
End: 2023-10-17
Payer: MEDICARE

## 2023-10-19 DIAGNOSIS — I48.19 PERSISTENT ATRIAL FIBRILLATION: Primary | ICD-10-CM

## 2023-10-23 ENCOUNTER — TELEPHONE (OUTPATIENT)
Dept: GASTROENTEROLOGY | Facility: CLINIC | Age: 83
End: 2023-10-23
Payer: MEDICARE

## 2023-10-23 NOTE — TELEPHONE ENCOUNTER
Informed pt to contact endo schedulers at [phone] to schedule repeat egd. Pt verbalize understand and thank me.  ----- Message from Tiffani Selby sent at 10/23/2023 11:51 AM CDT -----  Regarding: Patient advice  Contact: Pt  Pt called in regards to scheduling an Endoscopy       Pt can be reached at [phone]

## 2023-10-26 ENCOUNTER — TELEPHONE (OUTPATIENT)
Dept: ENDOSCOPY | Facility: HOSPITAL | Age: 83
End: 2023-10-26
Payer: MEDICARE

## 2023-10-26 NOTE — TELEPHONE ENCOUNTER
----- Message from Madan Alonso sent at 10/26/2023 12:38 PM CDT -----  Regarding: Endoscopy follow up  Contact: [phone]  Pt called in stating he do for a endoscopy follow up with Dr Swain. No appts were available. Please call pt to further discuss

## 2023-10-27 ENCOUNTER — TELEPHONE (OUTPATIENT)
Dept: ENDOSCOPY | Facility: HOSPITAL | Age: 83
End: 2023-10-27
Payer: MEDICARE

## 2023-10-27 ENCOUNTER — PATIENT MESSAGE (OUTPATIENT)
Dept: ENDOSCOPY | Facility: HOSPITAL | Age: 83
End: 2023-10-27
Payer: MEDICARE

## 2023-10-27 NOTE — TELEPHONE ENCOUNTER
Spoke to pt to schedule procedure(s) Upper Endoscopy (EGD)       Physician to perform procedure(s) Dr. LETICIA Swain  Date of Procedure (s) 11/28/23  Arrival Time 7:45 AM  Time of Procedure(s) 8:45 AM   Location of Procedure(s) [address]  Type of Rx Prep sent to patient: N/A  Instructions provided to patient via [facility]    Patient was informed on the following information and verbalized understanding. Screening questionnaire reviewed with patient and complete. If procedure requires anesthesia, a responsible adult needs to be present to accompany the patient home, patient cannot drive after receiving anesthesia. Appointment details are tentative, especially check-in time. Patient will receive a prep-op call 4 days prior to confirm check-in time for procedure. If applicable the patient should contact their pharmacy to verify Rx for procedure prep is ready for pick-up. Patient was advised to call the scheduling department at [phone] if pharmacy states no Rx is available. Patient was advised to call the endoscopy scheduling department if any questions or concerns arise.      [facility]oscopy Scheduling Department

## 2023-10-27 NOTE — TELEPHONE ENCOUNTER
Dear Dr. Pillai,    Patient has a scheduled procedure Upper Endoscopy (EGD) on 11/28/23 and is currently taking a blood thinner prescribed by your office. In order to ensure patient safety, we would like to confirm that the patient can place their blood thinner medication on hold for the procedure. Can he/she discontinue Eliquis (apixaban) for a minimum of 2 days prior to the procedure?     Thank you for your prompt reply.    [facility] Endoscopy Scheduling

## 2023-10-30 NOTE — TELEPHONE ENCOUNTER
Rudy Pillai MD Mahadeo, Nadira, MA; Tere Austin, RN  Caller: Unspecified (3 days ago, 11:03 AM)  Yes, Mr Healy can hold his Eliquis for 2 days prior to endoscopy and then started back on his home dose as soon as possible on completion of the procedure.     Thanks,   Rudy Pillai

## 2023-11-01 RX ORDER — APIXABAN 5 MG/1
TABLET, FILM COATED ORAL
Qty: 60 TABLET | Refills: 1 | Status: SHIPPED | OUTPATIENT
Start: 2023-11-01 | End: 2024-01-02

## 2023-11-01 NOTE — ADDENDUM NOTE
Addended by: MAYLIN GIL on: 6/3/2022 02:10 PM     Modules accepted: Orders     [facility] [place]        NAME: Waldo Chin is a 8 y.o. male  : 2010    MRN: [id number]  DATE: 2023  TIME: 6:56 PM    Assessment and Plan   Injury of left ankle, initial encounter [S99.912A]  1. Injury of left ankle, initial encounter  XR ankle 3+ vw left        Pt presents for eval of left ankle pain. States previously injured ankle- supposed to be wearing brace. Was not and was at gym using bike- mild swelling. States hurts " everywhere". Ambulating WNL. Xray negative. Discussed wearing brace and ortho f/u     Patient Instructions       Follow up with PCP/ ortho as needed  Chief Complaint     Chief Complaint   Patient presents with    Foot Pain     Had resolving L ankle sprain from . Reinjured last pm on treadmill not wearing lace up brace. No pain at rest but shooting w/ extension. Noted swelling L med ankle. No meds         History of Present Illness       Pt presents for eval of left ankle pain. States previously injured ankle- supposed to be wearing brace. Was not and was at gym using bike- mild swelling. States hurts " everywhere". Ambulating WNL. Xray negative. Discussed wearing brace and ortho f/u         Review of Systems   Review of Systems   Musculoskeletal:  Positive for arthralgias and joint swelling. Skin:  Negative for color change and wound. All other systems reviewed and are negative.         Current Medications       Current Outpatient Medications:     cetirizine (ZyrTEC) 10 mg tablet, Take 1 tablet (10 mg total) by mouth daily, Disp: 30 tablet, Rfl: 2    Current Allergies     Allergies as of 10/31/2023 - Reviewed 10/31/2023   Allergen Reaction Noted    Pollen extract Cough and Eye Swelling 2021            The following portions of the patient's history were reviewed and updated as appropriate: allergies, current medications, past family history, past medical history, past social history, past surgical history and problem list.     Past Medical History: Diagnosis Date    Allergic rhinitis     Eczema     Known health problems: none     Strep throat        Past Surgical History:   Procedure Laterality Date    CIRCUMCISION         Family History   Problem Relation Age of Onset    Asthma Mother     Hypertension Father     Migraines Maternal Aunt     Migraines Paternal Aunt     Migraines Maternal Grandmother     Lupus Sister     Seizures Neg Hx          Medications have been verified. Objective   Pulse 77   Temp 98.2 °F (36.8 °C)   Resp 16   Wt 49.9 kg (110 lb)   SpO2 99%   No LMP for male patient. Physical Exam     Physical Exam  Vitals reviewed. Constitutional:       General: He is active. Musculoskeletal:         General: Swelling, tenderness and signs of injury present. No deformity. Skin:     General: Skin is warm and dry. Findings: No erythema. Neurological:      Mental Status: He is alert.

## 2023-11-07 PROBLEM — Z01.810 PREOP CARDIOVASCULAR EXAM: Status: ACTIVE | Noted: 2023-11-07

## 2023-11-07 PROBLEM — I48.0 PAROXYSMAL ATRIAL FIBRILLATION: Status: ACTIVE | Noted: 2023-05-23

## 2023-11-07 NOTE — PROGRESS NOTES
Cardiology Clinic Note  Reason for Visit: pre-op    HPI:     Adrian Healy is a 83 y.o. M, who presents for pre-op.    He was noted to be in afib during outpatient EGD.   Followed up in general cardiology clinic. Was in rate controlled afib. Started DOAC.  Underwent DCCV 6/12/23. Started flecainide 100mg BID.  With NSR, he reported increased exercise tolerance, improved shortness of breath.    He was taking metoprolol with flecainide. Heart rate dropped to 40s.BB stopped.  He denies symptomatic recurrence of symptoms of afib (fatigue).  He exercises with a recumbent bike. Did 1 mile this morning. No CV complaints.  Joint pain is his limitation.    He has been having occasional nose bleeds - 1x/week to 1x/month.   Worse with bending/straining.  No GI/ bleeding.    No symptoms of HF, angina, arrhythmia, claudication.    Medical: HTN, HLD, paroxysmal atrial fibrillation s/p DCCV (6/12/23), GERD  Surgical: Reviewed, as below.  Family: Reviewed, as below. Brother with PPM.  Social: Reviewed, as below. Former smoker of 1 PPD x30y, quit 1997.    ROS:    Pertinent ROS included in HPI and below.  PMH:     Past Medical History:   Diagnosis Date    GERD (gastroesophageal reflux disease)     HEARING LOSS     Hyperlipidemia     Hypertension      Past Surgical History:   Procedure Laterality Date    COLONOSCOPY N/A 7/19/2018    Procedure: COLONOSCOPY;  Surgeon: Abilio Ferrara MD;  Location: [address]);  Service: Endoscopy;  Laterality: N/A;    COLONOSCOPY N/A 4/27/2022    Procedure: COLONOSCOPY;  Surgeon: Rudy Grace MD;  Location: [address]);  Service: Endoscopy;  Laterality: N/A;  fully vaccinated    ECHOCARDIOGRAM,TRANSESOPHAGEAL N/A 6/12/2023    Procedure: Transesophageal echo (SOILA) intra-procedure log documentation;  Surgeon: [facility] Provider;  Location: [facility] EP LAB;  Service: Cardiology;  Laterality: N/A;    ESOPHAGOGASTRODUODENOSCOPY N/A 3/21/2023    Procedure:  ESOPHAGOGASTRODUODENOSCOPY (EGD);  Surgeon: Wilmar Ordaz MD;  Location: [facility] ENDO (4TH FLR);  Service: Endoscopy;  Laterality: N/A;  inst portal-tb    ESOPHAGOGASTRODUODENOSCOPY N/A 5/1/2023    Procedure: EGD (ESOPHAGOGASTRODUODENOSCOPY);  Surgeon: Ajay Swain MD;  Location: [facility] ENDO (2ND FLR);  Service: Endoscopy;  Laterality: N/A;  4/13/23-Instructions via portal-DS  4/25 pre call no answer    EYE SURGERY      TREATMENT OF CARDIAC ARRHYTHMIA N/A 6/12/2023    Procedure: Cardioversion or Defibrillation;  Surgeon: SHARI Lagunas MD;  Location: [facility] EP LAB;  Service: Cardiology;  Laterality: N/A;  AF, SOILA, DCCV, MAC, EH, 3 Prep     Allergies:   Review of patient's allergies indicates:  No Known Allergies  Medications:     Current Outpatient Medications:     amLODIPine (NORVASC) 5 MG tablet, Take 1 tablet (5 mg total) by mouth once daily., Disp: 90 tablet, Rfl: 3    apixaban (ELIQUIS) 5 mg Tab, Take 1 tablet (5 mg total) by mouth 2 (two) times daily., Disp: 60 tablet, Rfl: 1    atorvastatin (LIPITOR) 10 MG tablet, Take 1 tablet (10 mg total) by mouth once daily., Disp: 90 tablet, Rfl: 3    cetirizine (ZYRTEC) 5 MG tablet, Take 1 tablet (5 mg total) by mouth once daily., Disp: 90 tablet, Rfl: 3    citalopram (CELEXA) 20 MG tablet, Take 1 tablet (20 mg total) by mouth once daily., Disp: 90 tablet, Rfl: 3    ELIQUIS 5 mg Tab, TAKE 1 TABLET BY MOUTH TWICE A DAY, Disp: 60 tablet, Rfl: 1    ferrous sulfate (FEOSOL) 325 mg (65 mg iron) Tab tablet, Take 1 tablet (325 mg total) by mouth once daily., Disp: 30 tablet, Rfl: 3    flecainide (TAMBOCOR) 100 MG Tab, TAKE 1 TABLET BY MOUTH EVERY 12 HOURS, Disp: 180 tablet, Rfl: 3    hydrocortisone 2.5 % cream, Apply topically 2 (two) times daily., Disp: 20 g, Rfl: 3    omeprazole (PRILOSEC) 40 MG capsule, Take 1 capsule (40 mg total) by mouth once daily., Disp: 90 capsule, Rfl: 3   Social History:     Social History     Tobacco Use    Smoking status: Former     Current  "packs/day: 0.00     Average packs/day: 1 pack/day for 30.0 years (30.0 ttl pk-yrs)     Types: Cigarettes     Start date: 1967     Quit date: 1997     Years since quittin.9    Smokeless tobacco: Former     Quit date: 12/10/1997   Substance Use Topics    Alcohol use: Yes     Alcohol/week: 5.8 standard drinks of alcohol     Types: 7 Standard drinks or equivalent per week     Comment: rum/coke 3-4 daily     Family History:     Family History   Problem Relation Age of Onset    Liver cancer Sister     Melanoma Sister     Cancer Brother     Heart disease Brother         pacemaker    Colon cancer Brother     Prostate cancer Brother      Physical Exam:   /72   Pulse 74   Ht 6' 1" (1.854 m)   Wt 98.7 kg (217 lb 9.5 oz)   SpO2 96%   BMI 28.71 kg/m²      Constitutional: No apparent distress, conversant  Neck: No jugular venous distension, no carotid bruits  CV: Regular rate and rhythm, no murmurs, normal S1/S2  Pulm: Clear to auscultation bilaterally  Extremities: No lower extremity edema, warm with palpable pulses    Labs:     Blood Tests:  Lab Results   Component Value Date     2023    K 4.3 2023     2023    CO2 26 2023    BUN 14 2023    CREATININE 1.0 2023    GLU 88 2023    HGBA1C 5.7 (H) 2023    MG 1.7 2023    AST 13 2023    ALT 10 2023    ALBUMIN 3.2 (L) 2023    PROT 5.5 (L) 2023    BILITOT 0.5 2023    WBC 7.99 2023    HGB 14.7 2023    HCT 45.7 2023    MCV 91 2023     2023    INR 1.1 2023    PSA 0.35 2023    TSH 0.939 2023       Lab Results   Component Value Date    CHOL 177 2023    HDL 58 2023    TRIG 71 2023       Lab Results   Component Value Date    LDLCALC 104.8 2023       Urine Tests:  Lab Results   Component Value Date    PROTEINURINE 5 2006       Imaging:     Echocardiogram  SOILA 23  SOILA performed prior to " DCCV.  Normal appearing left atrial appendage. No thrombus is present in the appendage. Abnormal appendage velocities.  The left ventricle is normal in size with normal systolic function.  The estimated ejection fraction is 50%.  Normal right ventricular size with normal right ventricular systolic function.  Mild mitral regurgitation.  Grade 2 plaque present.     TTE 23  The left ventricle is normal in size with mildly decreased systolic function. The estimated ejection fraction is 50%.  Mild right ventricular enlargement with normal right ventricular systolic function.  Severe left atrial enlargement.  The estimated PA systolic pressure is 24 mmHg.  Normal central venous pressure (3 mmHg).  Atrial fibrillation observed.    Stress testing  None    Cath Lab  None    Other  None    EK/7/23 - NSR, NSTTA (personally reviewed)  23 - NSR, NSTTA (personally reviewed)    Assessment:     1. Primary hypertension    2. Paroxysmal atrial fibrillation    3. Mixed hyperlipidemia    4. Preop cardiovascular exam        Plan:     Pre-operative cardiovascular risk stratification  RCRI score of 0 is consistent with 3.9 % laith-operative risk of major adverse cardiac event (cardiac death, nonfatal MI, nonfatal cardiac arrest).  Functional capacity >4 METs.  No symptoms of unstable cardiac disease.  No further medication adjustment or cardiac testing will reduce surgical risk prior to proceeding with planned surgery.  If needed, OK to hold eliquis for procedure. Restart ASAP after.    Paroxysmal atrial fibrillation  S/p DCCV (23)  [id number] 3  In sinus rhythm today  Continue meds  Encouraged evaluation for JACOBO. Not interested at this time.     Hyperlipidemia  Continue statin     Essential Hypertension  BP elevated today  Continue meds  Low salt, exercise, weight control    Signed:  Iain Queen MD  Cardiology     Follow-up:     Future Appointments   Date Time Provider Department Center   2023  3:30 PM Bret  Joe MCCARTHY III, MD [facility] CARDIO [facility]

## 2023-11-09 ENCOUNTER — OFFICE VISIT (OUTPATIENT)
Dept: CARDIOLOGY | Facility: CLINIC | Age: 83
End: 2023-11-09
Payer: MEDICARE

## 2023-11-09 ENCOUNTER — HOSPITAL ENCOUNTER (OUTPATIENT)
Dept: CARDIOLOGY | Facility: CLINIC | Age: 83
Discharge: HOME | End: 2023-11-09
Payer: MEDICARE

## 2023-11-09 VITALS
HEIGHT: 73 IN | OXYGEN SATURATION: 96 % | BODY MASS INDEX: 28.84 KG/M2 | WEIGHT: 217.63 LBS | DIASTOLIC BLOOD PRESSURE: 72 MMHG | SYSTOLIC BLOOD PRESSURE: 126 MMHG | HEART RATE: 74 BPM

## 2023-11-09 DIAGNOSIS — Z01.810 PREOP CARDIOVASCULAR EXAM: Primary | ICD-10-CM

## 2023-11-09 DIAGNOSIS — Z01.810 PREOP CARDIOVASCULAR EXAM: ICD-10-CM

## 2023-11-09 DIAGNOSIS — I10 PRIMARY HYPERTENSION: Primary | ICD-10-CM

## 2023-11-09 DIAGNOSIS — I48.0 PAROXYSMAL ATRIAL FIBRILLATION: ICD-10-CM

## 2023-11-09 DIAGNOSIS — E78.2 MIXED HYPERLIPIDEMIA: ICD-10-CM

## 2023-11-09 PROCEDURE — 99999 PR PBB SHADOW E&M-EST. PATIENT-LVL III: ICD-10-PCS | Mod: PBBFAC,,, | Performed by: INTERNAL MEDICINE

## 2023-11-09 PROCEDURE — 93010 EKG 12-LEAD: ICD-10-PCS | Mod: S$PBB,,, | Performed by: INTERNAL MEDICINE

## 2023-11-09 PROCEDURE — 93005 ELECTROCARDIOGRAM TRACING: CPT | Mod: PBBFAC | Performed by: INTERNAL MEDICINE

## 2023-11-09 PROCEDURE — 99213 OFFICE O/P EST LOW 20 MIN: CPT | Mod: PBBFAC | Performed by: INTERNAL MEDICINE

## 2023-11-09 PROCEDURE — 99999 PR PBB SHADOW E&M-EST. PATIENT-LVL III: CPT | Mod: PBBFAC,,, | Performed by: INTERNAL MEDICINE

## 2023-11-09 PROCEDURE — 93010 ELECTROCARDIOGRAM REPORT: CPT | Mod: S$PBB,,, | Performed by: INTERNAL MEDICINE

## 2023-11-09 PROCEDURE — 99214 PR OFFICE/OUTPT VISIT, EST, LEVL IV, 30-39 MIN: ICD-10-PCS | Mod: S$PBB,,, | Performed by: INTERNAL MEDICINE

## 2023-11-09 PROCEDURE — 99214 OFFICE O/P EST MOD 30 MIN: CPT | Mod: S$PBB,,, | Performed by: INTERNAL MEDICINE

## 2023-11-28 ENCOUNTER — HOSPITAL ENCOUNTER (OUTPATIENT)
Facility: HOSPITAL | Age: 83
Discharge: HOME | End: 2023-11-28
Attending: INTERNAL MEDICINE | Admitting: INTERNAL MEDICINE
Payer: MEDICARE

## 2023-11-28 ENCOUNTER — ANESTHESIA (OUTPATIENT)
Dept: ENDOSCOPY | Facility: HOSPITAL | Age: 83
End: 2023-11-28
Payer: MEDICARE

## 2023-11-28 ENCOUNTER — ANESTHESIA EVENT (OUTPATIENT)
Dept: ENDOSCOPY | Facility: HOSPITAL | Age: 83
End: 2023-11-28
Payer: MEDICARE

## 2023-11-28 VITALS
SYSTOLIC BLOOD PRESSURE: 135 MMHG | OXYGEN SATURATION: 99 % | HEIGHT: 73 IN | RESPIRATION RATE: 22 BRPM | WEIGHT: 217 LBS | HEART RATE: 53 BPM | BODY MASS INDEX: 28.76 KG/M2 | DIASTOLIC BLOOD PRESSURE: 75 MMHG | TEMPERATURE: 98 F

## 2023-11-28 DIAGNOSIS — K31.7 GASTRIC POLYP: ICD-10-CM

## 2023-11-28 PROCEDURE — 63600175 PHARM REV CODE 636 W HCPCS: Performed by: NURSE ANESTHETIST, CERTIFIED REGISTERED

## 2023-11-28 PROCEDURE — 25000003 PHARM REV CODE 250: Performed by: NURSE ANESTHETIST, CERTIFIED REGISTERED

## 2023-11-28 PROCEDURE — 37000009 HC ANESTHESIA EA ADD 15 MINS: Performed by: INTERNAL MEDICINE

## 2023-11-28 PROCEDURE — 25000003 PHARM REV CODE 250: Performed by: INTERNAL MEDICINE

## 2023-11-28 PROCEDURE — D9220A PRA ANESTHESIA: Mod: CRNA,,, | Performed by: NURSE ANESTHETIST, CERTIFIED REGISTERED

## 2023-11-28 PROCEDURE — 27200997: Performed by: INTERNAL MEDICINE

## 2023-11-28 PROCEDURE — 88305 TISSUE EXAM BY PATHOLOGIST: CPT | Mod: 26,,, | Performed by: PATHOLOGY

## 2023-11-28 PROCEDURE — 88305 TISSUE EXAM BY PATHOLOGIST: CPT | Performed by: PATHOLOGY

## 2023-11-28 PROCEDURE — 88305 TISSUE EXAM BY PATHOLOGIST: ICD-10-PCS | Mod: 26,,, | Performed by: PATHOLOGY

## 2023-11-28 PROCEDURE — 27201042 HC RETRIEVAL NET: Performed by: INTERNAL MEDICINE

## 2023-11-28 PROCEDURE — D9220A PRA ANESTHESIA: Mod: ANES,,, | Performed by: SURGERY

## 2023-11-28 PROCEDURE — D9220A PRA ANESTHESIA: ICD-10-PCS | Mod: CRNA,,, | Performed by: NURSE ANESTHETIST, CERTIFIED REGISTERED

## 2023-11-28 PROCEDURE — 37000008 HC ANESTHESIA 1ST 15 MINUTES: Performed by: INTERNAL MEDICINE

## 2023-11-28 PROCEDURE — 43251 PR EGD, FLEX, W/REMOVAL, TUMOR/POLYP/LESION(S), SNARE: ICD-10-PCS | Mod: ,,, | Performed by: INTERNAL MEDICINE

## 2023-11-28 PROCEDURE — 43251 EGD REMOVE LESION SNARE: CPT | Performed by: INTERNAL MEDICINE

## 2023-11-28 PROCEDURE — 43251 EGD REMOVE LESION SNARE: CPT | Mod: ,,, | Performed by: INTERNAL MEDICINE

## 2023-11-28 PROCEDURE — D9220A PRA ANESTHESIA: ICD-10-PCS | Mod: ANES,,, | Performed by: SURGERY

## 2023-11-28 PROCEDURE — 27201089 HC SNARE, DISP (ANY): Performed by: INTERNAL MEDICINE

## 2023-11-28 RX ORDER — LIDOCAINE HYDROCHLORIDE 20 MG/ML
INJECTION INTRAVENOUS
Status: DISCONTINUED | OUTPATIENT
Start: 2023-11-28 | End: 2023-11-28

## 2023-11-28 RX ORDER — PROPOFOL 10 MG/ML
VIAL (ML) INTRAVENOUS CONTINUOUS PRN
Status: DISCONTINUED | OUTPATIENT
Start: 2023-11-28 | End: 2023-11-28

## 2023-11-28 RX ORDER — PHENYLEPHRINE HCL IN 0.9% NACL 1 MG/10 ML
SYRINGE (ML) INTRAVENOUS
Status: DISCONTINUED | OUTPATIENT
Start: 2023-11-28 | End: 2023-11-28

## 2023-11-28 RX ORDER — SODIUM CHLORIDE 0.9 % (FLUSH) 0.9 %
10 SYRINGE (ML) INJECTION
Status: DISCONTINUED | OUTPATIENT
Start: 2023-11-28 | End: 2023-11-28 | Stop reason: HOSPADM

## 2023-11-28 RX ORDER — PROPOFOL 10 MG/ML
VIAL (ML) INTRAVENOUS
Status: DISCONTINUED | OUTPATIENT
Start: 2023-11-28 | End: 2023-11-28

## 2023-11-28 RX ORDER — SODIUM CHLORIDE 9 MG/ML
INJECTION, SOLUTION INTRAVENOUS CONTINUOUS PRN
Status: DISCONTINUED | OUTPATIENT
Start: 2023-11-28 | End: 2023-11-28

## 2023-11-28 RX ORDER — FENTANYL CITRATE 50 UG/ML
INJECTION, SOLUTION INTRAMUSCULAR; INTRAVENOUS
Status: DISCONTINUED | OUTPATIENT
Start: 2023-11-28 | End: 2023-11-28

## 2023-11-28 RX ORDER — SODIUM CHLORIDE 9 MG/ML
INJECTION, SOLUTION INTRAVENOUS CONTINUOUS
Status: DISCONTINUED | OUTPATIENT
Start: 2023-11-28 | End: 2023-11-28 | Stop reason: HOSPADM

## 2023-11-28 RX ADMIN — Medication 100 MCG: at 09:11

## 2023-11-28 RX ADMIN — PROPOFOL 8 MG: 10 INJECTION, EMULSION INTRAVENOUS at 09:11

## 2023-11-28 RX ADMIN — Medication 200 MCG: at 09:11

## 2023-11-28 RX ADMIN — FENTANYL CITRATE 50 MCG: 50 INJECTION, SOLUTION INTRAMUSCULAR; INTRAVENOUS at 09:11

## 2023-11-28 RX ADMIN — PROPOFOL 150 MCG/KG/MIN: 10 INJECTION, EMULSION INTRAVENOUS at 09:11

## 2023-11-28 RX ADMIN — SODIUM CHLORIDE: 9 INJECTION, SOLUTION INTRAVENOUS at 08:11

## 2023-11-28 RX ADMIN — SODIUM CHLORIDE: 0.9 INJECTION, SOLUTION INTRAVENOUS at 09:11

## 2023-11-28 RX ADMIN — LIDOCAINE HYDROCHLORIDE 80 MG: 20 INJECTION INTRAVENOUS at 09:11

## 2023-11-28 RX ADMIN — GLYCOPYRROLATE 0.2 MG: 0.2 INJECTION, SOLUTION INTRAMUSCULAR; INTRAVENOUS at 09:11

## 2023-11-28 NOTE — H&P
Short Stay Endoscopy History and Physical    PCP - Xander Will MD  Referring Physician - Ajay Swain MD  [address]  [address]  [place] [address]    Procedure - egd  ASA - per anesthesia  Mallampati - per anesthesia  History of Anesthesia problems - no  Family history Anesthesia problems -  no   Plan of anesthesia - General    HPI:  This is a 83 y.o. male here for evaluation of: gastric polyps    Reflux - no  Dysphagia - no  Abdominal pain - no  Diarrhea - no    ROS:  Constitutional: No fevers, chills, No weight loss  CV: No chest pain  Pulm: No cough, No shortness of breath  Ophtho: No vision changes  GI: see HPI  Derm: No rash    Medical History:  has a past medical history of GERD (gastroesophageal reflux disease), HEARING LOSS, Hyperlipidemia, and Hypertension.    Surgical History:  has a past surgical history that includes Eye surgery; Colonoscopy (N/A, 7/19/2018); Colonoscopy (N/A, 4/27/2022); Esophagogastroduodenoscopy (N/A, 3/21/2023); Esophagogastroduodenoscopy (N/A, 5/1/2023); Treatment of cardiac arrhythmia (N/A, 6/12/2023); and echocardiogram,transesophageal (N/A, 6/12/2023).    Family History: family history includes Cancer in his brother; Colon cancer in his brother; Heart disease in his brother; Liver cancer in his sister; Melanoma in his sister; Prostate cancer in his brother..    Social History:  reports that he quit smoking about 25 years ago. His smoking use included cigarettes. He started smoking about 56 years ago. He has a 30.0 pack-year smoking history. He quit smokeless tobacco use about 25 years ago. He reports current alcohol use of about 5.8 standard drinks of alcohol per week. He reports that he does not use drugs.    Review of patient's allergies indicates:  No Known Allergies    Medications:   Medications Prior to Admission   Medication Sig Dispense Refill Last Dose    amLODIPine (NORVASC) 5 MG tablet Take 1 tablet (5 mg total) by mouth once daily. 90 tablet  3 11/27/2023    atorvastatin (LIPITOR) 10 MG tablet Take 1 tablet (10 mg total) by mouth once daily. 90 tablet 3 11/27/2023    citalopram (CELEXA) 20 MG tablet Take 1 tablet (20 mg total) by mouth once daily. 90 tablet 3 11/27/2023    ferrous sulfate (FEOSOL) 325 mg (65 mg iron) Tab tablet Take 1 tablet (325 mg total) by mouth once daily. 30 tablet 3 11/27/2023    omeprazole (PRILOSEC) 40 MG capsule Take 1 capsule (40 mg total) by mouth once daily. 90 capsule 3 11/27/2023    apixaban (ELIQUIS) 5 mg Tab Take 1 tablet (5 mg total) by mouth 2 (two) times daily. 60 tablet 1 11/25/2023    cetirizine (ZYRTEC) 5 MG tablet Take 1 tablet (5 mg total) by mouth once daily. 90 tablet 3     ELIQUIS 5 mg Tab TAKE 1 TABLET BY MOUTH TWICE A DAY 60 tablet 1     flecainide (TAMBOCOR) 100 MG Tab TAKE 1 TABLET BY MOUTH EVERY 12 HOURS 180 tablet 3     hydrocortisone 2.5 % cream Apply topically 2 (two) times daily. 20 g 3        Physical Exam:    Vital Signs:   Vitals:    11/28/23 0821   BP: 135/67   Pulse: (!) 59   Resp: 16   Temp: 97.7 °F (36.5 °C)       General Appearance: Well appearing in no acute distress    Labs:  Lab Results   Component Value Date    WBC 7.99 06/27/2023    HGB 14.7 06/27/2023    HCT 45.7 06/27/2023     06/27/2023    CHOL 177 02/27/2023    TRIG 71 02/27/2023    HDL 58 02/27/2023    ALT 10 05/01/2023    AST 13 05/01/2023     06/09/2023    K 4.3 06/09/2023     06/09/2023    CREATININE 1.0 06/09/2023    BUN 14 06/09/2023    CO2 26 06/09/2023    TSH 0.939 02/27/2023    PSA 0.35 02/27/2023    INR 1.1 06/09/2023    HGBA1C 5.7 (H) 02/27/2023       I have explained the risks and benefits of this endoscopic procedure to the patient including but not limited to bleeding, inflammation, infection, perforation, and death.      Ajay Swain MD

## 2023-11-28 NOTE — ANESTHESIA POSTPROCEDURE EVALUATION
Anesthesia Post Evaluation    Patient: Adrian Healy    Procedure(s) Performed: Procedure(s) (LRB):  EGD (ESOPHAGOGASTRODUODENOSCOPY) (N/A)    Final Anesthesia Type: general      Patient location during evaluation: [facility]  Patient participation: Yes- Able to Participate  Level of consciousness: awake and alert  Post-procedure vital signs: reviewed and stable  Pain management: adequate  Airway patency: patent  JACOBO mitigation strategies: Multimodal analgesia, Preoperative use of mandibular advancement devices or oral appliances and Intraoperative administration of CPAP, nasopharyngeal airway, or oral appliance during sedation  PONV status at discharge: No PONV  Anesthetic complications: no      Cardiovascular status: blood pressure returned to baseline, hemodynamically stable and stable  Respiratory status: unassisted and spontaneous ventilation  Hydration status: euvolemic  Follow-up not needed.          Vitals Value Taken Time   /72 11/28/23 1032   Temp 36.7 °C (98.1 °F) 11/28/23 0950   Pulse 51 11/28/23 1033   Resp 30 11/28/23 1033   SpO2 98 % 11/28/23 1033   Vitals shown include unvalidated device data.      No case tracking events are documented in the log.      Pain/Yuri Score: Yuri Score: 10 (11/28/2023 10:00 AM)

## 2023-11-28 NOTE — PROVATION PATIENT INSTRUCTIONS
Discharge Summary/Instructions after an Endoscopic Procedure  Patient Name: Adrian Healy  Patient MRN: [id number]  Patient YOB: 1940  Tuesday, November 28, 2023  Ajay Swain MD  Dear patient,  As a result of recent federal legislation (The Federal Cures Act), you may   receive lab or pathology results from your procedure in your [facility]   account before your physician is able to contact you. Your physician or   their representative will relay the results to you with their   recommendations at their soonest availability.  Thank you,  RESTRICTIONS:  During your procedure today, you received medications for sedation.  These   medications may affect your judgment, balance and coordination.  Therefore,   for 24 hours, you have the following restrictions:   - DO NOT drive a car, operate machinery, make legal/financial decisions,   sign important papers or drink alcohol.    ACTIVITY:  Today: no heavy lifting, straining or running due to procedural   sedation/anesthesia.  The following day: return to full activity including work.  DIET:  Eat and drink normally unless instructed otherwise.     TREATMENT FOR COMMON SIDE EFFECTS:  - Mild abdominal pain, nausea, belching, bloating or excessive gas:  rest,   eat lightly and use a heating pad.  - Sore Throat: treat with throat lozenges and/or gargle with warm salt   water.  - Because air was used during the procedure, expelling large amounts of air   from your rectum or belching is normal.  - If a bowel prep was taken, you may not have a bowel movement for 1-3 days.    This is normal.  SYMPTOMS TO WATCH FOR AND REPORT TO YOUR PHYSICIAN:  1. Abdominal pain or bloating, other than gas cramps.  2. Chest pain.  3. Back pain.  4. Signs of infection such as: chills or fever occurring within 24 hours   after the procedure.  5. Rectal bleeding, which would show as bright red, maroon, or black stools.   (A tablespoon of blood from the rectum is not serious, especially if    hemorrhoids are present.)  6. Vomiting.  7. Weakness or dizziness.  GO DIRECTLY TO THE NEAREST EMERGENCY ROOM IF YOU HAVE ANY OF THE FOLLOWING:      Difficulty breathing              Chills and/or fever over 101 F   Persistent vomiting and/or vomiting blood   Severe abdominal pain   Severe chest pain   Black, tarry stools   Bleeding- more than one tablespoon   Any other symptom or condition that you feel may need urgent attention  Your doctor recommends these additional instructions:  If any biopsies were taken, your doctors clinic will contact you in 1 to 2   weeks with any results.  - Discharge patient to home.   - Resume previous diet.   - Continue present medications.   - Await pathology results.   - Repeat upper endoscopy in 1 year for retreatment.  For questions, problems or results please call your physician - Ajay Swain MD at Work:  [phone].  [facility], EMERGENCY ROOM PHONE NUMBER: [phone]  IF A COMPLICATION OR EMERGENCY SITUATION ARISES AND YOU ARE UNABLE TO REACH   YOUR PHYSICIAN - GO DIRECTLY TO THE EMERGENCY ROOM.  Ajay Swain MD  11/28/2023 9:53:01 AM  This report has been verified and signed electronically.  Dear patient,  As a result of recent federal legislation (The Federal Cures Act), you may   receive lab or pathology results from your procedure in your [facility]   account before your physician is able to contact you. Your physician or   their representative will relay the results to you with their   recommendations at their soonest availability.  Thank you,  PROVATION

## 2023-11-28 NOTE — TRANSFER OF CARE
"Anesthesia Transfer of Care Note    Patient: Adrian Healy    Procedure(s) Performed: Procedure(s) (LRB):  EGD (ESOPHAGOGASTRODUODENOSCOPY) (N/A)    Patient location: [facility]    Anesthesia Type: general    Transport from OR: Transported from OR on 2-3 L/min O2 by NC with adequate spontaneous ventilation    Post pain: adequate analgesia    Post assessment: no apparent anesthetic complications and tolerated procedure well    Post vital signs: stable    Level of consciousness: awake, alert and oriented    Nausea/Vomiting: no nausea/vomiting    Complications: none    Transfer of care protocol was followed      Last vitals: Visit Vitals  /67 (Patient Position: Lying)   Pulse (!) 59   Temp 36.5 °C (97.7 °F) (Temporal)   Resp 16   Ht 6' 1" (1.854 m)   Wt 98.4 kg (217 lb)   SpO2 99%   BMI 28.63 kg/m²     "

## 2023-11-28 NOTE — ANESTHESIA PREPROCEDURE EVALUATION
11/28/2023  Adrian Healy is a 83 y.o., male.  Patient Active Problem List   Diagnosis    Hyperlipidemia    Hypertension    GERD (gastroesophageal reflux disease)    Hearing loss, sensorineural    Special screening for malignant neoplasms, colon    Anxiety    Jaw pain    Popping of both temporomandibular joints on opening of jaw    Paroxysmal atrial fibrillation    Overweight (BMI 25.0-29.9)    Preop cardiovascular exam           Pre-op Assessment    I have reviewed the Patient Summary Reports.     I have reviewed the Nursing Notes. I have reviewed the NPO Status.   I have reviewed the Medications.     Review of Systems  Anesthesia Hx:  No problems with previous Anesthesia                Hematology/Oncology:  Hematology Normal   Oncology Normal                                   Cardiovascular:     Hypertension    Dysrhythmias atrial fibrillation                                   Pulmonary:  Pulmonary Normal                       Hepatic/GI:     GERD             Musculoskeletal:  Musculoskeletal Normal                Neurological:  Neurology Normal                                      Psych:   anxiety                 Physical Exam  General: Well nourished, Cooperative, Oriented and Alert    Airway:  Mallampati: II   Mouth Opening: Normal  TM Distance: Normal  Tongue: Normal  No airway management difficulties anticipated  Dental:  No active dental issues noted  Chest/Lungs:  Clear to auscultation    Heart:  Rate: Normal  Rhythm: Regular Rhythm  Sounds: Normal        Anesthesia Plan  Type of Anesthesia, risks & benefits discussed:    Anesthesia Type: Gen Natural Airway  Intra-op Monitoring Plan: Standard ASA Monitors  Post Op Pain Control Plan: multimodal analgesia  Induction:  IV  Informed Consent: Informed consent signed with the Patient and all parties understand the risks and agree with anesthesia plan.   All questions answered.   ASA Score: 3  Day of Surgery Review of History & Physical: H&P Update referred to the surgeon/provider.  Anesthesia Plan Notes: Chart reviewed. Patient seen and examined. Anesthesia plan discussed and questions answered. E-consent signed. Mao Sher MD    Ready For Surgery From Anesthesia Perspective.     .

## 2023-11-30 LAB
FINAL PATHOLOGIC DIAGNOSIS: NORMAL
GROSS: NORMAL
Lab: NORMAL

## 2024-01-02 RX ORDER — APIXABAN 5 MG/1
TABLET, FILM COATED ORAL
Qty: 60 TABLET | Refills: 1 | Status: SHIPPED | OUTPATIENT
Start: 2024-01-02

## 2024-01-09 ENCOUNTER — TELEPHONE (OUTPATIENT)
Dept: INTERNAL MEDICINE | Facility: CLINIC | Age: 84
End: 2024-01-09
Payer: MEDICARE

## 2024-01-09 DIAGNOSIS — E53.8 B12 DEFICIENCY: ICD-10-CM

## 2024-01-09 DIAGNOSIS — Z12.5 SCREENING FOR PROSTATE CANCER: Primary | ICD-10-CM

## 2024-01-09 DIAGNOSIS — R73.09 ELEVATED GLUCOSE LEVEL: ICD-10-CM

## 2024-01-09 DIAGNOSIS — I10 PRIMARY HYPERTENSION: ICD-10-CM

## 2024-01-09 DIAGNOSIS — D50.9 IRON DEFICIENCY ANEMIA, UNSPECIFIED IRON DEFICIENCY ANEMIA TYPE: ICD-10-CM

## 2024-01-09 DIAGNOSIS — E78.5 HYPERLIPIDEMIA, UNSPECIFIED HYPERLIPIDEMIA TYPE: ICD-10-CM

## 2024-01-09 NOTE — TELEPHONE ENCOUNTER
----- Message from Liseth Pagan sent at 1/9/2024  3:15 PM CST -----  Contact: patient  [phone]  Patient called requesting a call back from Dr. Will's nurse, regarding having orders for labs put in before his appt on 03/11

## 2024-02-15 ENCOUNTER — PATIENT MESSAGE (OUTPATIENT)
Dept: ORTHOPEDICS | Facility: CLINIC | Age: 84
End: 2024-02-15
Payer: MEDICARE

## 2024-02-16 DIAGNOSIS — M25.562 ACUTE PAIN OF BOTH KNEES: Primary | ICD-10-CM

## 2024-02-16 DIAGNOSIS — M25.561 ACUTE PAIN OF BOTH KNEES: Primary | ICD-10-CM

## 2024-02-19 RX ORDER — ATORVASTATIN CALCIUM 10 MG/1
10 TABLET, FILM COATED ORAL
Qty: 90 TABLET | Refills: 0 | Status: SHIPPED | OUTPATIENT
Start: 2024-02-19 | End: 2024-03-11 | Stop reason: SDUPTHER

## 2024-02-19 RX ORDER — OMEPRAZOLE 40 MG/1
40 CAPSULE, DELAYED RELEASE ORAL
Qty: 90 CAPSULE | Refills: 0 | Status: SHIPPED | OUTPATIENT
Start: 2024-02-19 | End: 2024-03-11 | Stop reason: SDUPTHER

## 2024-02-19 RX ORDER — CITALOPRAM 20 MG/1
20 TABLET, FILM COATED ORAL
Qty: 90 TABLET | Refills: 0 | Status: SHIPPED | OUTPATIENT
Start: 2024-02-19 | End: 2024-05-18

## 2024-02-19 NOTE — TELEPHONE ENCOUNTER
Adrian Healy  is requesting a refill authorization.  Brief Assessment and Rationale for Refill:  Approve     Medication Therapy Plan:  FLOS; FOVS in 3 weeks;      Pharmacist review requested: Yes   Extended chart review required: Yes   Comments:     Note composed:12:03 PM 02/19/2024

## 2024-02-19 NOTE — TELEPHONE ENCOUNTER
Care Due:                  Date            Visit Type   Department     Provider  --------------------------------------------------------------------------------                                EP -                              PRIMARY      [facility] INTERNAL  Last Visit: 03-      CARE ([facility])   KATARZYNA Will                               -                              PRIMARY      [facility] INTERNAL  Next Visit: 03-      CARE ([facility])   KATARZYNA Will                                                            Last  Test          Frequency    Reason                     Performed    Due Date  --------------------------------------------------------------------------------    CMP.........  12 months..  atorvastatin.............  05- 04-    Lipid Panel.  12 months..  atorvastatin.............  02- 02-    [facility] Embedded Care Due Messages. Reference number: [id number].   2/19/2024 12:10:13 AM CST

## 2024-02-25 NOTE — PROGRESS NOTES
"GYN Visit    CC:   Chief Complaint   Patient presents with    Follow-up     Breast exam also discuss patient menstrual cycles and had ultrasound recently at the hospital        HPI:   21 y.o. Contraception or HRT: Contraception:  None    Cycles are awful, coming about every 28-35 days.  Will have PMS symptoms prior to starting.   Bleeds for 7-12 days.  Bleeds very heavy, becomes lightheaded. Cramping is severe,  Aleve helps but will still miss work. Has tried OCPs but they make her nauseated.      Has lumps in her breasts.  Had a ultrasound a few days ago.  States the lump on the right has gotten bigger.     US Breast Bilateral Limited (2022 10:54)    US Pelvis Transvaginal Non OB (10/17/2022 11:33)    US from 23 is not available    History: PMHx, Meds, Allergies, PSHx, Social Hx, and POBHx all reviewed and updated.    Review of Systems   Constitutional: Negative.    Genitourinary:  Positive for breast lump and menstrual problem.     PHYSICAL EXAM:  /76   Pulse 76   Ht 157.5 cm (62.01\")   Wt 45.8 kg (101 lb)   BMI 18.47 kg/m²      Physical Exam  Vitals and nursing note reviewed. Exam conducted with a chaperone present.   Constitutional:       General: She is not in acute distress.     Appearance: Normal appearance. She is well-groomed.   Chest:   Breasts:     Breasts are symmetrical.      Right: Normal. Mass present. No inverted nipple, nipple discharge, skin change or tenderness.      Left: Normal. Mass present. No inverted nipple, nipple discharge, skin change or tenderness.       Lymphadenopathy:      Upper Body:      Right upper body: No supraclavicular, axillary or pectoral adenopathy.      Left upper body: No supraclavicular, axillary or pectoral adenopathy.   Neurological:      Mental Status: She is alert.   Psychiatric:         Behavior: Behavior is cooperative.       ASSESSMENT AND PLAN:  Diagnoses and all orders for this visit:    1. Bilateral breast lump " na   (Primary)  Overview:  Will place referral to Dr. Lopez, unable to view ultrasound report from 6/9/23.  Explained will likely need referral from PCP as well.     Orders:  -     Ambulatory Referral to General Surgery    2. Dysmenorrhea  Assessment & Plan:  Reviewed US from 10/17/22.  Discussed treatment options to include POPs, Depo Provera, IUD and Nexplanon.  Patient will try POPs, does not want a device.     Orders:  -     norethindrone (MICRONOR) 0.35 MG tablet; Take 1 tablet by mouth Daily.  Dispense: 84 tablet; Refill: 4        Counseling:  TRACK MENSES, RTO if <q21d, >7d long, heavy or painful.    All BIRTH CONTROL options R/B/A/SE/E of each reviewed in detail.    Follow Up:  Return in about 3 months (around 9/12/2023) for Next scheduled follow up.        Jose A Connors, NEVAEH  06/12/2023    [facility] [address]  [facility]  [address] [address]  Dept: [phone]  Loc: [phone]

## 2024-02-27 ENCOUNTER — OFFICE VISIT (OUTPATIENT)
Dept: ORTHOPEDICS | Facility: CLINIC | Age: 84
End: 2024-02-27
Payer: MEDICARE

## 2024-02-27 ENCOUNTER — HOSPITAL ENCOUNTER (OUTPATIENT)
Dept: RADIOLOGY | Facility: HOSPITAL | Age: 84
Discharge: HOME | End: 2024-02-27
Attending: ORTHOPAEDIC SURGERY
Payer: MEDICARE

## 2024-02-27 VITALS — HEIGHT: 73 IN | WEIGHT: 208.75 LBS | BODY MASS INDEX: 27.67 KG/M2

## 2024-02-27 DIAGNOSIS — M25.561 ACUTE PAIN OF BOTH KNEES: ICD-10-CM

## 2024-02-27 DIAGNOSIS — M25.562 ACUTE PAIN OF BOTH KNEES: ICD-10-CM

## 2024-02-27 DIAGNOSIS — G25.9 MOVEMENT DISORDER: ICD-10-CM

## 2024-02-27 DIAGNOSIS — M17.11 PATELLOFEMORAL ARTHRITIS OF RIGHT KNEE: Primary | ICD-10-CM

## 2024-02-27 PROCEDURE — 73564 X-RAY EXAM KNEE 4 OR MORE: CPT | Mod: TC,50

## 2024-02-27 PROCEDURE — 73564 X-RAY EXAM KNEE 4 OR MORE: CPT | Mod: 26,50,, | Performed by: RADIOLOGY

## 2024-02-27 PROCEDURE — 99999 PR PBB SHADOW E&M-EST. PATIENT-LVL III: CPT | Mod: PBBFAC,,, | Performed by: ORTHOPAEDIC SURGERY

## 2024-02-27 PROCEDURE — 99213 OFFICE O/P EST LOW 20 MIN: CPT | Mod: PBBFAC,25 | Performed by: ORTHOPAEDIC SURGERY

## 2024-02-27 PROCEDURE — 99204 OFFICE O/P NEW MOD 45 MIN: CPT | Mod: S$PBB,,, | Performed by: ORTHOPAEDIC SURGERY

## 2024-02-27 NOTE — PROGRESS NOTES
Subjective:     HPI:   Adrian Healy is a 83 y.o. male who presents for eval B knee R>L knee pain     History of Present Illness  The patient is here for evaluation of both knees, the right is worse than the left.    His knees have been bothering him for over 20 years. He was very active when he was younger. He used to fish and crab every week and after every session of fishing and crabbing, he would be laid up for a couple of days with it hurting. He saw Dr. Mccarthy in 2017 who sent him to [facility] for some gel injections. He had 9 injections, which did not help. He had 2 steroid injections and 2 rounds of Euflexxa injections from 2017 to 2019. He likes to walk. He used to be able to do 2 miles, but he could not go fast. His right knee bothers him mostly on the inside. His left knee is vague. He denies any groin pain on either side. He denies any anterior thigh pain. He denies any shooting pain down his legs to his toes. He denies any low back, buttock pain, groin pain, anterior thigh pain, radicular pain or paresthesias. He was taking Tylenol 2 tablets, which would help at night. He can not take Advil or Aleve because he is on Eliquis. He has not had any injections since 2019. He did physical therapy after the scope 20 years ago. He has not tried any braces or compressive knee sleeves. He does not use a cane, walker, or crutches. Last week, he walked around the block, which is little over half a mile a day and a couple of days later, he did it again and it started hurting. He had to give his boat to his grandkids because he could not fish anymore.   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   He had an ablation in 06/2023 for atrial fibrillation.    Not much pain today    Decreased activity/debility after a fib 2023, didn't do much, got out of shape    B knee pain >20 years  Hx R knee ATS 2000 GC 2017 - sent to  for euflexxa series  Over [id number] got 2 CSI, 2 rounds of euflexxa injections  "- says "little while" of relief with those injections  Was walking at [employer] at that time - 2 miles but went slow    Medications: tylenol would help at night, can't take nsaids on eliquis    Injections: Over [id number] got 2 CSI, 2 rounds of euflexxa injections - says "little while" of relief with those injections    Physical Therapy: after scope 20 years ago    Bracing: none    Assistive Devices: none    Walkin - 5 blocks  Walked about 1/2 mile last week a couple times last week and then was "laid up after that"    Limitations:   Walking  Had to give boat to grandkids, couldn't fish or crab anymore  Cut the grass      Occupation: retired out of construction business    Social support: wife     ROS:  The updated medical history is in the chart and has been reviewed. A review of systems is updated and there is no reported vision changes, ear/nose/mouth/throat complaints,  chest pain, shortness of breath, abdominal pain, urological complaints, fevers or chills, psychiatric complaints. Musculoskeletal and neurologcial symptoms are as documented. All other systems are negative.      Objective:   Exam:  There were no vitals filed for this visit.  Body mass index is 27.54 kg/m².    Physical examination included assessment of the patient's general appearance with particular attention to development, nutrition, body habitus, attention to grooming, and any evidence of distress.  Constitutional: The patient is a well-developed, well-nourished patient in no acute distress.   Cardiovascular: Vascular examination included warmth and capillary refill as well inspection for edema and assessment of pedal pulses. Pulses are palpable and regular.  Musculoskeletal: Gait was assessed as to whether it was steady, non-antalgic, and/or required the use of an assist device. The patient was also asked to walk independently and get onto the examination table.  Skin: The skin was examined for any obvious rashes or lesions in " the extremity.  Neurologic: Sensation is intact to light touch in the extremity. The patient has good coordination without hyperreflexia and is alert and oriented to person, place and time and has normal mood and affect.     All of the above were examined and found to be within normal limits except for the following pertinent clinical findings:    Physical Exam  No limp, nonantalgic gait, slow gait, no shuffling. No groin pain with active straight leg raise. Right knee 0 to 135 degree knee range of motion, 3 degrees valgus alignment. Knee is stable to anterior, posterior, varus and valgus stresses without flexion contracture or extensor lag. He is nontender to palpation at the medial, lateral, and patellofemoral aspect of the joint lines. He has no significant effusion. He has well-healed scope portal incisions. No significant pain with range of motion of the left knee as well.  Palp DP pulses    Flat face  mild tremor and cogwheeling with elbow ROM and RLE ROM      Imaging:    Results      Low grade B knee degen changes with Preserved med and lat compartments, no change from 2017  Klg3-4 PF OA      Assessment:       ICD-10-CM ICD-9-CM   1. Patellofemoral arthritis of right knee  M17.11 716.96   2. Movement disorder  G25.9 333.90      A fib on eliquis, s/p ablation 6/23  GI upset with cbrex    Plan:     Assessment & Plan  1. Bilateral knee pain, right worse than left.    R PF OA:   The above findings were discussed with patient length. We discussed the risks of conservative versus surgical management knee arthritis. Conservative management consisting of anti-inflammatory medications, glucosamine/chondroitin sulfate, weight loss, physical therapy, activity modification, as well as injections (lubricant versus corticosteroid) was discussed at length. At this point considering the patient's level of activity, pain, and radiographic findings I recommend continued conservative management of the knee arthritis.  "    Tylenol  Offered CSI: doesn't want one today "not hurting today"  Would like Rx for PT I think this will help any potential OA but also movement disorder    May have early movement disorder/PD  Wife says walking is slower, leaning forward more (no back pain, radicular pain)  Offered ref to neurology, he wants to start with PT, call for ref to neurology if symptoms persist after PT       Orders Placed This Encounter   Procedures    Ambulatory referral/consult to Physical/Occupational Therapy     Standing Status:   Future     Standing Expiration Date:   3/27/2025     Referral Priority:   Routine     Referral Type:   Physical Medicine     Referral Reason:   Specialty Services Required     Number of Visits Requested:   1       This note was generated with the assistance of ambient listening technology. Verbal consent was obtained by the patient and accompanying visitor(s) for the recording of patient appointment to facilitate this note. I attest to having reviewed and edited the generated note for accuracy, though some syntax or spelling errors may persist. Please contact the author of this note for any clarification.            Past Medical History:   Diagnosis Date    GERD (gastroesophageal reflux disease)     HEARING LOSS     Hyperlipidemia     Hypertension        Past Surgical History:   Procedure Laterality Date    COLONOSCOPY N/A 7/19/2018    Procedure: COLONOSCOPY;  Surgeon: Abilio Ferrara MD;  Location: [address]);  Service: Endoscopy;  Laterality: N/A;    COLONOSCOPY N/A 4/27/2022    Procedure: COLONOSCOPY;  Surgeon: Rudy Grace MD;  Location: [address]);  Service: Endoscopy;  Laterality: N/A;  fully vaccinated    ECHOCARDIOGRAM,TRANSESOPHAGEAL N/A 6/12/2023    Procedure: Transesophageal echo (SOILA) intra-procedure log documentation;  Surgeon: [facility] Diagnostic Provider;  Location: [facility] EP LAB;  Service: Cardiology;  Laterality: N/A;    ESOPHAGOGASTRODUODENOSCOPY N/A 3/21/2023    " Procedure: ESOPHAGOGASTRODUODENOSCOPY (EGD);  Surgeon: Wilmar Ordaz MD;  Location: [facility] ENDO (4TH FLR);  Service: Endoscopy;  Laterality: N/A;  inst portal-tb    ESOPHAGOGASTRODUODENOSCOPY N/A 2023    Procedure: EGD (ESOPHAGOGASTRODUODENOSCOPY);  Surgeon: Ajay Swain MD;  Location: [facility] ENDO (2ND FLR);  Service: Endoscopy;  Laterality: N/A;  23-Instructions via portal-DS   pre call no answer    ESOPHAGOGASTRODUODENOSCOPY N/A 2023    Procedure: EGD (ESOPHAGOGASTRODUODENOSCOPY);  Surgeon: Ajay Swain MD;  Location: [facility] ENDO (2ND FLR);  Service: Endoscopy;  Laterality: N/A;  Approval to hold Seeder rec'd from Dr. Pillai (see telephone encounter 10/27/23)-DS  10/27/23-Instructions via portal-DS  23-Precall complete-DS    EYE SURGERY      TREATMENT OF CARDIAC ARRHYTHMIA N/A 2023    Procedure: Cardioversion or Defibrillation;  Surgeon: SHARI Lagunas MD;  Location: [facility] EP LAB;  Service: Cardiology;  Laterality: N/A;  AF, SOILA, DCCV, MAC, EH, 3 Prep       Family History   Problem Relation Age of Onset    Liver cancer Sister     Melanoma Sister     Cancer Brother     Heart disease Brother         pacemaker    Colon cancer Brother     Prostate cancer Brother        Social History     Socioeconomic History    Marital status:    Tobacco Use    Smoking status: Former     Current packs/day: 0.00     Average packs/day: 1 pack/day for 30.0 years (30.0 ttl pk-yrs)     Types: Cigarettes     Start date: 1967     Quit date: 1997     Years since quittin.2    Smokeless tobacco: Former     Quit date: 12/10/1997   Substance and Sexual Activity    Alcohol use: Yes     Alcohol/week: 5.8 standard drinks of alcohol     Types: 7 Standard drinks or equivalent per week     Comment: rum/coke 3-4 daily    Drug use: No    Sexual activity: Never     Social Determinants of Health     Financial Resource Strain: Low Risk  (2020)    Overall Financial Resource Strain (CARDIA)      Difficulty of Paying Living Expenses: Not hard at all   Food Insecurity: No Food Insecurity (2/11/2020)    Hunger Vital Sign     Worried About Running Out of Food in the Last Year: Never true     Ran Out of Food in the Last Year: Never true   Transportation Needs: No Transportation Needs (2/11/2020)    PRAPARE - Transportation     Lack of Transportation (Medical): No     Lack of Transportation (Non-Medical): No   Physical Activity: Insufficiently Active (2/11/2020)    Exercise Vital Sign     Days of Exercise per Week: 3 days     Minutes of Exercise per Session: 40 min   Stress: No Stress Concern Present (2/11/2020)    [employer] [place] [employer] - Occupational Stress Questionnaire     Feeling of Stress : Not at all   Social Connections: Unknown (2/11/2020)    Social Connection and Isolation Panel [NHANES]     Frequency of Communication with Friends and Family: Once a week     Frequency of Social Gatherings with Friends and Family: Once a week     Active Member of Clubs or Organizations: No     Attends Club or Organization Meetings: Never     Marital Status:

## 2024-03-01 RX ORDER — AMLODIPINE BESYLATE 5 MG/1
5 TABLET ORAL
Qty: 90 TABLET | Refills: 0 | Status: SHIPPED | OUTPATIENT
Start: 2024-03-01 | End: 2024-03-11 | Stop reason: SDUPTHER

## 2024-03-01 NOTE — TELEPHONE ENCOUNTER
No care due was identified.  [facility] Embedded Care Due Messages. Reference number: [id number].   3/01/2024 12:12:40 AM CST

## 2024-03-02 DIAGNOSIS — I48.91 NEW ONSET A-FIB: Primary | ICD-10-CM

## 2024-03-02 NOTE — TELEPHONE ENCOUNTER
Refill Decision Note   Adrian Monet  is requesting a refill authorization.  Brief Assessment and Rationale for Refill:  Approve     Medication Therapy Plan:         Comments:     Note composed:6:02 PM 03/01/2024

## 2024-03-04 RX ORDER — APIXABAN 5 MG/1
TABLET, FILM COATED ORAL
Qty: 60 TABLET | Refills: 1 | Status: SHIPPED | OUTPATIENT
Start: 2024-03-04 | End: 2024-03-11 | Stop reason: SDUPTHER

## 2024-03-07 ENCOUNTER — CLINICAL SUPPORT (OUTPATIENT)
Dept: REHABILITATION | Facility: HOSPITAL | Age: 84
End: 2024-03-07
Attending: ORTHOPAEDIC SURGERY
Payer: MEDICARE

## 2024-03-07 DIAGNOSIS — M25.562 CHRONIC PAIN OF LEFT KNEE: ICD-10-CM

## 2024-03-07 DIAGNOSIS — G89.29 CHRONIC PAIN OF LEFT KNEE: ICD-10-CM

## 2024-03-07 DIAGNOSIS — G89.29 CHRONIC PAIN OF RIGHT KNEE: Primary | ICD-10-CM

## 2024-03-07 DIAGNOSIS — M25.561 CHRONIC PAIN OF RIGHT KNEE: Primary | ICD-10-CM

## 2024-03-07 DIAGNOSIS — M17.11 PATELLOFEMORAL ARTHRITIS OF RIGHT KNEE: ICD-10-CM

## 2024-03-07 PROCEDURE — 97112 NEUROMUSCULAR REEDUCATION: CPT

## 2024-03-07 PROCEDURE — 97161 PT EVAL LOW COMPLEX 20 MIN: CPT

## 2024-03-07 NOTE — PLAN OF CARE
[facility]   Physical Therapy Initial Evaluation      Name: Adrian Healy  Clinic Number: [id number]    Therapy Diagnosis:   Encounter Diagnoses   Name Primary?    Patellofemoral arthritis of right knee     Chronic pain of right knee Yes    Chronic pain of left knee         Physician: Dve Frankel III, *    Physician Orders: PT Eval and Treat   Medical Diagnosis from Referral: M17.11 (ICD-10-CM) - Patellofemoral arthritis of right knee   Evaluation Date: 3/7/2024  Authorization Period Expiration: 02/26/2025   Plan of Care Expiration: 5/7/2024  Progress Note Due: 5/7/2024  Visit # / Visits authorized: 1/ 1   FOTO: 1/3    Precautions: Standard     Time In: 0900  Time Out: 1000  Total Appointment Time (timed & untimed codes): 60 minutes    Subjective     Date of onset: ~ 1 year     History of current condition - Adrian reports:     Patient reports onset of increased of R knee pain ~1-2 years ago. Around this time he was diagnosed with A-fib, which significantly affected his ability to be active. Since this has been under control he has had difficulty returning to working out and biking, which causes pain. Reports aggravation of symptoms with weightbearing activities and when very aggravated he reports his knee swells, which lasts for 3-4 days.    He has PMH of knee arthroscopy around 2000. Also has had CSI and gel injection series w/o relief of symptoms.    Does report fall outlined below when he was working on unsteady surface; denies injury from this or that it was because of R knee.    Falls: fell in backyard a couple of months ago and needed assistance from neighbor to get up    Imaging:    Knee x-ray:    FINDINGS:  Bones are demineralized mild degenerative changes seen.  No joint effusion is noted.    Prior Therapy: none  Social History: lives with their spouse  Occupation: retired  Prior Level of Function: chronic  Current Level of Function: pain with weightbearing and biking;  history of recent fall    Pain:  Current 2/10, worst 8/10, best 2/10   Location: R knee  Description: Aching, Dull, Sharp, and Swollen  Aggravating Factors: Standing, Walking, Flexing, Lifting, Getting out of bed/chair, and Bike  Easing Factors: nothing and rest    Patients goals: ADLs w/o complaint / return to gym     Medical History:   Past Medical History:   Diagnosis Date    GERD (gastroesophageal reflux disease)     HEARING LOSS     Hyperlipidemia     Hypertension        Surgical History:   Adrian Healy  has a past surgical history that includes Eye surgery; Colonoscopy (N/A, 7/19/2018); Colonoscopy (N/A, 4/27/2022); Esophagogastroduodenoscopy (N/A, 3/21/2023); Esophagogastroduodenoscopy (N/A, 5/1/2023); Treatment of cardiac arrhythmia (N/A, 6/12/2023); echocardiogram,transesophageal (N/A, 6/12/2023); and Esophagogastroduodenoscopy (N/A, 11/28/2023).    Medications:   Adrian has a current medication list which includes the following prescription(s): amlodipine, apixaban, atorvastatin, cetirizine, citalopram, eliquis, ferrous sulfate, flecainide, hydrocortisone, and omeprazole.    Allergies:   Review of patient's allergies indicates:  No Known Allergies     Objective      Observation / Gait: WFL    Posture: genu varum andrea    Palpation: TTP medial PFJ    Sensation: WNL    Edema: mild to mod    Range of Motion (Passive):   Knee Right  Left    Flexion 135 135   Extension +2 +3     Range of Motion (Active):   Knee Right  Left    Flexion 130 130   Extension 0 0     Lower Extremity Strength:  Right LE  Left LE    Quadriceps: 4-/5 * pain + lag SLR Quadriceps: 4/5 + lag SLR   Hamstrings: 4+/5 Hamstrings: 4+/5   Hip Flexion: 3+/5 Hip Flexion: 4-/5   Hip Extension:  3-/5 Hip Extension: 3/5   Hip ABD: 3-/5 Hip ABD: 3/5     Joint Mobility: WNL     Flexibility:    90/90 Hamstrings: R = +++ ; L = +++    Mariely's test: R = - ; L = -   Prone knee bend (RF): R = +; L = +    Special Tests:   Right Left   Valgus Stress Test - -    Varus Stress Test - -   Anterior Drawer Mild hypermobility -   Posterior Drawer at 30 deg (PLC) / 90 deg (PCL) - -   Dial Test (PLC) - -   Lachman's Test - -   Posterior Sag Test - -   Patellar Grind Test + -       Limitation/Restriction for FOTO Knee Survey    Therapist reviewed FOTO scores for Adrian Healy on 3/7/2024.   FOTO documents entered into [facility] - see Media section.    Limitation Score: see media%         Treatment     Total Treatment time (time-based codes) separate from Evaluation: 30 minutes     Adrian received the treatments listed below:      neuromuscular re-education activities to improve: Balance, Coordination, Kinesthetic, Sense, Proprioception, and Posture for 30 minutes. The following activities were included:    Patient education:  - diagnosis, prognosis, impairments  - importance of quad NM activation and strength  - HEP    Quad set towel 15x5 sec ea andrea  ASLR 15x5 sec ea andrea - cue DF if needed   Bridge 3x10x5 sec hold    NEXT VISIT - review above prior to starting NuStep / clam / add YTB to bridge    therapeutic activities to improve functional performance for 00  minutes, including:    NT    Patient Education and Home Exercises     Education provided:   - see above    Written Home Exercises Provided: yes. Exercises were reviewed and Adrian was able to demonstrate them prior to the end of the session.  Adrian demonstrated good  understanding of the education provided. See EMR under Patient Instructions for exercises provided during therapy sessions.    Assessment     Adrian is a 83 y.o. male referred to outpatient Physical Therapy with a medical diagnosis of M17.11 (ICD-10-CM) - Patellofemoral arthritis of right knee. Patient presents with pain, swelling, HS and quad flexibility deficits, along with significant quad and gluteal NM activation and strength deficits. Impairments contributory to pain affecting ADLs and preventing gym activities.    Patient prognosis is Good.   Patient  will benefit from skilled outpatient Physical Therapy to address the deficits stated above and in the chart below, provide patient /family education, and to maximize patientt's level of independence.     Plan of care discussed with patient: Yes  Patient's spiritual, cultural and educational needs considered and patient is agreeable to the plan of care and goals as stated below:     Anticipated Barriers for therapy: chronicity    Medical Necessity is demonstrated by the following  History  Co-morbidities and personal factors that may impact the plan of care [] LOW: no personal factors / co-morbidities  [x] MODERATE: 1-2 personal factors / co-morbidities  [] HIGH: 3+ personal factors / co-morbidities    Moderate / High Support Documentation:   Co-morbidities affecting plan of care: chronicity    Personal Factors:   no deficits     Examination  Body Structures and Functions, activity limitations and participation restrictions that may impact the plan of care [] LOW: addressing 1-2 elements  [] MODERATE: 3+ elements  [x] HIGH: 4+ elements (please support below)    Moderate / High Support Documentation: pain, swelling, HS and quad flexibility deficits, along with significant quad and gluteal NM activation and strength deficits.      Clinical Presentation [x] LOW: stable  [] MODERATE: Evolving  [] HIGH: Unstable     Decision Making/ Complexity Score: low       GOALS: Short Term Goals:  0-4 weeks  1.Report decreased R knee pain  < / =  4/10 at worst to increase tolerance for ADLs  2. Increase knee ROM to full and pain free in order to be able to perform ADLs without difficulty.  3. Increase strength by 1/3 MMT grade in quad  to increase tolerance for ADL and work activities.  4. Pt to tolerate HEP to improve ROM and independence with ADL's    Long Term Goals: 5-8 weeks  1.Report decreased R knee pain < / = 2/10 at worst to increase tolerance for ADLs  2.Patient goal: ADLs w/o complaint / return to gym  3.Increase strength  to >/= 4+/5 in quad  to increase tolerance for ADL and work activities.  4. Pt will report at CJ level (20-40% impaired) on FOTO knee to demonstrate increase in LE function with every day tasks.     Plan     Plan of care Certification: 3/7/2024 to 5/7/2024.    Outpatient Physical Therapy 2 times weekly for 6-8 weeks to include the following interventions: Gait Training, Manual Therapy, Moist Heat/ Ice, Neuromuscular Re-ed, Patient Education, Self Care, Therapeutic Activities, and Therapeutic Exercise.     MARTA ROCHA, PT, DPT, OCS

## 2024-03-08 ENCOUNTER — LAB VISIT (OUTPATIENT)
Dept: LAB | Facility: HOSPITAL | Age: 84
End: 2024-03-08
Attending: INTERNAL MEDICINE
Payer: COMMERCIAL

## 2024-03-08 DIAGNOSIS — Z12.5 SCREENING FOR PROSTATE CANCER: ICD-10-CM

## 2024-03-08 DIAGNOSIS — E53.8 B12 DEFICIENCY: ICD-10-CM

## 2024-03-08 DIAGNOSIS — D50.9 IRON DEFICIENCY ANEMIA, UNSPECIFIED IRON DEFICIENCY ANEMIA TYPE: ICD-10-CM

## 2024-03-08 DIAGNOSIS — I10 PRIMARY HYPERTENSION: ICD-10-CM

## 2024-03-08 DIAGNOSIS — R73.09 ELEVATED GLUCOSE LEVEL: ICD-10-CM

## 2024-03-08 DIAGNOSIS — E78.5 HYPERLIPIDEMIA, UNSPECIFIED HYPERLIPIDEMIA TYPE: ICD-10-CM

## 2024-03-08 LAB
ALBUMIN SERPL BCP-MCNC: 3.7 G/DL (ref 3.5–5.2)
ALP SERPL-CCNC: 68 U/L (ref 55–135)
ALT SERPL W/O P-5'-P-CCNC: 14 U/L (ref 10–44)
ANION GAP SERPL CALC-SCNC: 9 MMOL/L (ref 8–16)
AST SERPL-CCNC: 17 U/L (ref 10–40)
BASOPHILS # BLD AUTO: 0.05 K/UL (ref 0–0.2)
BASOPHILS NFR BLD: 0.8 % (ref 0–1.9)
BILIRUB SERPL-MCNC: 0.5 MG/DL (ref 0.1–1)
BUN SERPL-MCNC: 12 MG/DL (ref 8–23)
CALCIUM SERPL-MCNC: 9.6 MG/DL (ref 8.7–10.5)
CHLORIDE SERPL-SCNC: 105 MMOL/L (ref 95–110)
CHOLEST SERPL-MCNC: 162 MG/DL (ref 120–199)
CHOLEST/HDLC SERPL: 3.4 {RATIO} (ref 2–5)
CO2 SERPL-SCNC: 25 MMOL/L (ref 23–29)
COMPLEXED PSA SERPL-MCNC: 0.51 NG/ML (ref 0–4)
CREAT SERPL-MCNC: 1.1 MG/DL (ref 0.5–1.4)
DIFFERENTIAL METHOD BLD: ABNORMAL
EOSINOPHIL # BLD AUTO: 0.2 K/UL (ref 0–0.5)
EOSINOPHIL NFR BLD: 3.3 % (ref 0–8)
ERYTHROCYTE [DISTWIDTH] IN BLOOD BY AUTOMATED COUNT: 14.5 % (ref 11.5–14.5)
EST. GFR  (NO RACE VARIABLE): >60 ML/MIN/1.73 M^2
ESTIMATED AVG GLUCOSE: 108 MG/DL (ref 68–131)
GLUCOSE SERPL-MCNC: 92 MG/DL (ref 70–110)
HBA1C MFR BLD: 5.4 % (ref 4–5.6)
HCT VFR BLD AUTO: 40.4 % (ref 40–54)
HDLC SERPL-MCNC: 48 MG/DL (ref 40–75)
HDLC SERPL: 29.6 % (ref 20–50)
HGB BLD-MCNC: 13.1 G/DL (ref 14–18)
IMM GRANULOCYTES # BLD AUTO: 0.02 K/UL (ref 0–0.04)
IMM GRANULOCYTES NFR BLD AUTO: 0.3 % (ref 0–0.5)
IRON SERPL-MCNC: 81 UG/DL (ref 45–160)
LDLC SERPL CALC-MCNC: 95.4 MG/DL (ref 63–159)
LYMPHOCYTES # BLD AUTO: 1.4 K/UL (ref 1–4.8)
LYMPHOCYTES NFR BLD: 21.6 % (ref 18–48)
MCH RBC QN AUTO: 29.7 PG (ref 27–31)
MCHC RBC AUTO-ENTMCNC: 32.4 G/DL (ref 32–36)
MCV RBC AUTO: 92 FL (ref 82–98)
MONOCYTES # BLD AUTO: 0.4 K/UL (ref 0.3–1)
MONOCYTES NFR BLD: 6.6 % (ref 4–15)
NEUTROPHILS # BLD AUTO: 4.3 K/UL (ref 1.8–7.7)
NEUTROPHILS NFR BLD: 67.4 % (ref 38–73)
NONHDLC SERPL-MCNC: 114 MG/DL
NRBC BLD-RTO: 0 /100 WBC
PLATELET # BLD AUTO: 234 K/UL (ref 150–450)
PMV BLD AUTO: 10.9 FL (ref 9.2–12.9)
POTASSIUM SERPL-SCNC: 4 MMOL/L (ref 3.5–5.1)
PROT SERPL-MCNC: 6.8 G/DL (ref 6–8.4)
RBC # BLD AUTO: 4.41 M/UL (ref 4.6–6.2)
SATURATED IRON: 18 % (ref 20–50)
SODIUM SERPL-SCNC: 139 MMOL/L (ref 136–145)
TOTAL IRON BINDING CAPACITY: 459 UG/DL (ref 250–450)
TRANSFERRIN SERPL-MCNC: 310 MG/DL (ref 200–375)
TRIGL SERPL-MCNC: 93 MG/DL (ref 30–150)
TSH SERPL DL<=0.005 MIU/L-ACNC: 0.83 UIU/ML (ref 0.4–4)
VIT B12 SERPL-MCNC: 610 PG/ML (ref 210–950)
WBC # BLD AUTO: 6.33 K/UL (ref 3.9–12.7)

## 2024-03-08 PROCEDURE — 84443 ASSAY THYROID STIM HORMONE: CPT | Performed by: INTERNAL MEDICINE

## 2024-03-08 PROCEDURE — 80053 COMPREHEN METABOLIC PANEL: CPT | Performed by: INTERNAL MEDICINE

## 2024-03-08 PROCEDURE — 84153 ASSAY OF PSA TOTAL: CPT | Performed by: INTERNAL MEDICINE

## 2024-03-08 PROCEDURE — 83540 ASSAY OF IRON: CPT | Performed by: INTERNAL MEDICINE

## 2024-03-08 PROCEDURE — 83036 HEMOGLOBIN GLYCOSYLATED A1C: CPT | Performed by: INTERNAL MEDICINE

## 2024-03-08 PROCEDURE — 85025 COMPLETE CBC W/AUTO DIFF WBC: CPT | Performed by: INTERNAL MEDICINE

## 2024-03-08 PROCEDURE — 82607 VITAMIN B-12: CPT | Performed by: INTERNAL MEDICINE

## 2024-03-08 PROCEDURE — 36415 COLL VENOUS BLD VENIPUNCTURE: CPT | Performed by: INTERNAL MEDICINE

## 2024-03-08 PROCEDURE — 80061 LIPID PANEL: CPT | Performed by: INTERNAL MEDICINE

## 2024-03-11 ENCOUNTER — OFFICE VISIT (OUTPATIENT)
Dept: INTERNAL MEDICINE | Facility: CLINIC | Age: 84
End: 2024-03-11
Payer: MEDICARE

## 2024-03-11 VITALS
DIASTOLIC BLOOD PRESSURE: 60 MMHG | BODY MASS INDEX: 28.93 KG/M2 | SYSTOLIC BLOOD PRESSURE: 114 MMHG | WEIGHT: 218.25 LBS | HEIGHT: 73 IN | OXYGEN SATURATION: 98 % | HEART RATE: 57 BPM

## 2024-03-11 DIAGNOSIS — I10 PRIMARY HYPERTENSION: ICD-10-CM

## 2024-03-11 DIAGNOSIS — M25.562 CHRONIC PAIN OF LEFT KNEE: ICD-10-CM

## 2024-03-11 DIAGNOSIS — E53.8 B12 DEFICIENCY: ICD-10-CM

## 2024-03-11 DIAGNOSIS — I48.0 PAROXYSMAL ATRIAL FIBRILLATION: ICD-10-CM

## 2024-03-11 DIAGNOSIS — D50.8 OTHER IRON DEFICIENCY ANEMIA: ICD-10-CM

## 2024-03-11 DIAGNOSIS — Z00.00 ROUTINE PHYSICAL EXAMINATION: Primary | ICD-10-CM

## 2024-03-11 DIAGNOSIS — E78.2 MIXED HYPERLIPIDEMIA: ICD-10-CM

## 2024-03-11 DIAGNOSIS — G89.29 CHRONIC PAIN OF RIGHT KNEE: ICD-10-CM

## 2024-03-11 DIAGNOSIS — M25.561 CHRONIC PAIN OF RIGHT KNEE: ICD-10-CM

## 2024-03-11 DIAGNOSIS — Z12.5 SCREENING FOR PROSTATE CANCER: ICD-10-CM

## 2024-03-11 DIAGNOSIS — H90.3 SENSORINEURAL HEARING LOSS (SNHL) OF BOTH EARS: ICD-10-CM

## 2024-03-11 DIAGNOSIS — G89.29 CHRONIC PAIN OF LEFT KNEE: ICD-10-CM

## 2024-03-11 DIAGNOSIS — K21.9 GASTROESOPHAGEAL REFLUX DISEASE, UNSPECIFIED WHETHER ESOPHAGITIS PRESENT: ICD-10-CM

## 2024-03-11 DIAGNOSIS — F41.9 ANXIETY: ICD-10-CM

## 2024-03-11 PROCEDURE — 99214 OFFICE O/P EST MOD 30 MIN: CPT | Mod: PBBFAC | Performed by: INTERNAL MEDICINE

## 2024-03-11 PROCEDURE — 99999 PR PBB SHADOW E&M-EST. PATIENT-LVL IV: CPT | Mod: PBBFAC,,, | Performed by: INTERNAL MEDICINE

## 2024-03-11 PROCEDURE — 99397 PER PM REEVAL EST PAT 65+ YR: CPT | Mod: GZ,S$PBB,, | Performed by: INTERNAL MEDICINE

## 2024-03-11 RX ORDER — FERROUS SULFATE 325(65) MG
325 TABLET ORAL DAILY
Qty: 30 TABLET | Refills: 3 | Status: SHIPPED | OUTPATIENT
Start: 2024-03-11 | End: 2024-06-10

## 2024-03-11 RX ORDER — OMEPRAZOLE 40 MG/1
40 CAPSULE, DELAYED RELEASE ORAL EVERY MORNING
Qty: 90 CAPSULE | Refills: 3 | Status: SHIPPED | OUTPATIENT
Start: 2024-03-11

## 2024-03-11 RX ORDER — ATORVASTATIN CALCIUM 10 MG/1
10 TABLET, FILM COATED ORAL DAILY
Qty: 90 TABLET | Refills: 3 | Status: SHIPPED | OUTPATIENT
Start: 2024-03-11

## 2024-03-11 RX ORDER — AMLODIPINE BESYLATE 5 MG/1
5 TABLET ORAL DAILY
Qty: 90 TABLET | Refills: 3 | Status: SHIPPED | OUTPATIENT
Start: 2024-03-11

## 2024-03-11 NOTE — PROGRESS NOTES
Subjective:       Patient ID: Adrian Healy is a 83 y.o. male.    Chief Complaint: Annual Exam    Patient in for follow-up medical problems and for annual exam.  Patient says he has not doing bad for 83.  He has been going through some issues with acid reflux, gastric polyps in mild iron-deficiency anemia.  He will have an upper GI scope later this year.  Most recently another polyp was removed.  It did have some slight bleeding.  Patient has stable anxiety and is on medication.  Stable hypertension, hyperlipidemia and reflux    Prescriptions reviewed and refilled  Labs reviewed.  The blood count is slightly low again and TIBC is slightly elevated.  He has not taken iron for months so we will resume that.      Review of Systems   Constitutional:  Negative for chills, fatigue and fever.   HENT:  Negative for nosebleeds and trouble swallowing.    Eyes:  Negative for pain and visual disturbance.   Respiratory:  Negative for cough, shortness of breath and wheezing.    Cardiovascular:  Negative for chest pain and palpitations.   Gastrointestinal:  Negative for abdominal pain, constipation, diarrhea, nausea and vomiting.   Genitourinary:  Negative for difficulty urinating and hematuria.   Musculoskeletal:  Positive for arthralgias (stable). Negative for back pain and neck pain.   Integumentary:  Negative for rash.   Neurological:  Negative for dizziness and headaches.   Hematological:  Does not bruise/bleed easily.   Psychiatric/Behavioral:  Negative for dysphoric mood and sleep disturbance. The patient is nervous/anxious (stable).            Past Medical History:   Diagnosis Date    GERD (gastroesophageal reflux disease)     HEARING LOSS     Hyperlipidemia     Hypertension      Past Surgical History:   Procedure Laterality Date    COLONOSCOPY N/A 7/19/2018    Procedure: COLONOSCOPY;  Surgeon: Abilio Ferrara MD;  Location: [address];  Service: Endoscopy;  Laterality: N/A;    COLONOSCOPY N/A 4/27/2022     Procedure: COLONOSCOPY;  Surgeon: Rudy Grace MD;  Location: [facility] ENDO (4TH FLR);  Service: Endoscopy;  Laterality: N/A;  fully vaccinated    ECHOCARDIOGRAM,TRANSESOPHAGEAL N/A 6/12/2023    Procedure: Transesophageal echo (SOILA) intra-procedure log documentation;  Surgeon: [facility];  Location: [facility] EP LAB;  Service: Cardiology;  Laterality: N/A;    ESOPHAGOGASTRODUODENOSCOPY N/A 3/21/2023    Procedure: ESOPHAGOGASTRODUODENOSCOPY (EGD);  Surgeon: Wilmar Ordaz MD;  Location: [facility] ENDO (4TH FLR);  Service: Endoscopy;  Laterality: N/A;  inst portal-tb    ESOPHAGOGASTRODUODENOSCOPY N/A 5/1/2023    Procedure: EGD (ESOPHAGOGASTRODUODENOSCOPY);  Surgeon: Ajay Swain MD;  Location: [facility] ENDO (2ND FLR);  Service: Endoscopy;  Laterality: N/A;  4/13/23-Instructions via portal-DS  4/25 pre call no answer    ESOPHAGOGASTRODUODENOSCOPY N/A 11/28/2023    Procedure: EGD (ESOPHAGOGASTRODUODENOSCOPY);  Surgeon: Ajay Swain MD;  Location: [facility] ENDO (2ND FLR);  Service: Endoscopy;  Laterality: N/A;  Approval to hold Marina rec'd from Dr. Pillai (see telephone encounter 10/27/23)-DS  10/27/23-Instructions via portal-DS  11/22/23-Precall complete-DS    EYE SURGERY      TREATMENT OF CARDIAC ARRHYTHMIA N/A 6/12/2023    Procedure: Cardioversion or Defibrillation;  Surgeon: SHARI Lagunas MD;  Location: [facility] EP LAB;  Service: Cardiology;  Laterality: N/A;  AF, SOILA, DCCV, MAC, EH, 3 Prep      Patient Active Problem List   Diagnosis    Hyperlipidemia    Hypertension    GERD (gastroesophageal reflux disease)    Hearing loss, sensorineural    Special screening for malignant neoplasms, colon    Anxiety    Jaw pain    Popping of both temporomandibular joints on opening of jaw    Paroxysmal atrial fibrillation    Overweight (BMI 25.0-29.9)    Preop cardiovascular exam    Chronic pain of right knee    Chronic pain of left knee        Objective:      Physical Exam  Constitutional:       General: He is not in  acute distress.     Appearance: He is well-developed.   HENT:      Head: Normocephalic and atraumatic.      Right Ear: Tympanic membrane, ear canal and external ear normal.      Left Ear: Tympanic membrane, ear canal and external ear normal.      Mouth/Throat:      Pharynx: No oropharyngeal exudate or posterior oropharyngeal erythema.   Eyes:      General: No scleral icterus.     Conjunctiva/sclera: Conjunctivae normal.      Pupils: Pupils are equal, round, and reactive to light.   Neck:      Thyroid: No thyromegaly.      Comments: No supraclavicular nodes palpated  Cardiovascular:      Rate and Rhythm: Normal rate and regular rhythm.      Pulses: Normal pulses.      Heart sounds: Normal heart sounds. No murmur heard.  Pulmonary:      Effort: Pulmonary effort is normal.      Breath sounds: Normal breath sounds. No wheezing.   Abdominal:      General: Bowel sounds are normal.      Palpations: Abdomen is soft. There is no mass.      Tenderness: There is no abdominal tenderness.   Musculoskeletal:         General: No tenderness.      Cervical back: Normal range of motion and neck supple.      Right lower leg: No edema.      Left lower leg: No edema.   Lymphadenopathy:      Cervical: No cervical adenopathy.   Skin:     Coloration: Skin is not jaundiced or pale.   Neurological:      General: No focal deficit present.      Mental Status: He is alert and oriented to person, place, and time.   Psychiatric:         Mood and Affect: Mood normal.         Behavior: Behavior normal.         Assessment:       Problem List Items Addressed This Visit          Psychiatric    Anxiety    Relevant Orders    TSH    Vitamin B12    Lipid Panel    Hemoglobin A1C    Comprehensive Metabolic Panel    CBC Auto Differential    Iron and TIBC       ENT    Hearing loss, sensorineural    Relevant Orders    TSH    Vitamin B12    Lipid Panel    Hemoglobin A1C    Comprehensive Metabolic Panel    CBC Auto Differential    Iron and TIBC        Cardiac/Vascular    Hyperlipidemia    Relevant Orders    TSH    Vitamin B12    Lipid Panel    Hemoglobin A1C    Comprehensive Metabolic Panel    CBC Auto Differential    Iron and TIBC    Hypertension    Relevant Orders    TSH    Vitamin B12    Lipid Panel    Hemoglobin A1C    Comprehensive Metabolic Panel    CBC Auto Differential    Iron and TIBC    Paroxysmal atrial fibrillation    Relevant Orders    TSH    Vitamin B12    Lipid Panel    Hemoglobin A1C    Comprehensive Metabolic Panel    CBC Auto Differential    Iron and TIBC       GI    GERD (gastroesophageal reflux disease)    Relevant Orders    TSH    Vitamin B12    Lipid Panel    Hemoglobin A1C    Comprehensive Metabolic Panel    CBC Auto Differential    Iron and TIBC       Orthopedic    Chronic pain of right knee    Relevant Orders    TSH    Vitamin B12    Lipid Panel    Hemoglobin A1C    Comprehensive Metabolic Panel    CBC Auto Differential    Iron and TIBC    Chronic pain of left knee    Relevant Orders    TSH    Vitamin B12    Lipid Panel    Hemoglobin A1C    Comprehensive Metabolic Panel    CBC Auto Differential    Iron and TIBC     Other Visit Diagnoses       Routine physical examination    -  Primary    Relevant Orders    TSH    PSA, Screening    Vitamin B12    Lipid Panel    Hemoglobin A1C    Comprehensive Metabolic Panel    CBC Auto Differential    Iron and TIBC    Screening for prostate cancer        Relevant Orders    TSH    PSA, Screening    Vitamin B12    Lipid Panel    Hemoglobin A1C    Comprehensive Metabolic Panel    CBC Auto Differential    Iron and TIBC    B12 deficiency        Relevant Orders    TSH    Vitamin B12    Lipid Panel    Hemoglobin A1C    Comprehensive Metabolic Panel    CBC Auto Differential    Iron and TIBC    Other iron deficiency anemia        Relevant Orders    TSH    Vitamin B12    Lipid Panel    Hemoglobin A1C    Comprehensive Metabolic Panel    CBC Auto Differential    Iron and TIBC            Plan:         Adrian was seen  today for annual exam.    Diagnoses and all orders for this visit:    Routine physical examination  -     TSH; Future  -     PSA, Screening; Future  -     Vitamin B12; Future  -     Lipid Panel; Future  -     Hemoglobin A1C; Future  -     Comprehensive Metabolic Panel; Future  -     CBC Auto Differential; Future  -     Iron and TIBC; Future    Primary hypertension  -     TSH; Future  -     Vitamin B12; Future  -     Lipid Panel; Future  -     Hemoglobin A1C; Future  -     Comprehensive Metabolic Panel; Future  -     CBC Auto Differential; Future  -     Iron and TIBC; Future    Mixed hyperlipidemia  -     TSH; Future  -     Vitamin B12; Future  -     Lipid Panel; Future  -     Hemoglobin A1C; Future  -     Comprehensive Metabolic Panel; Future  -     CBC Auto Differential; Future  -     Iron and TIBC; Future    Sensorineural hearing loss (SNHL) of both ears  -     TSH; Future  -     Vitamin B12; Future  -     Lipid Panel; Future  -     Hemoglobin A1C; Future  -     Comprehensive Metabolic Panel; Future  -     CBC Auto Differential; Future  -     Iron and TIBC; Future    Anxiety  -     TSH; Future  -     Vitamin B12; Future  -     Lipid Panel; Future  -     Hemoglobin A1C; Future  -     Comprehensive Metabolic Panel; Future  -     CBC Auto Differential; Future  -     Iron and TIBC; Future    Gastroesophageal reflux disease, unspecified whether esophagitis present  -     TSH; Future  -     Vitamin B12; Future  -     Lipid Panel; Future  -     Hemoglobin A1C; Future  -     Comprehensive Metabolic Panel; Future  -     CBC Auto Differential; Future  -     Iron and TIBC; Future    Paroxysmal atrial fibrillation  -     TSH; Future  -     Vitamin B12; Future  -     Lipid Panel; Future  -     Hemoglobin A1C; Future  -     Comprehensive Metabolic Panel; Future  -     CBC Auto Differential; Future  -     Iron and TIBC; Future    Chronic pain of left knee  -     TSH; Future  -     Vitamin B12; Future  -     Lipid Panel;  "Future  -     Hemoglobin A1C; Future  -     Comprehensive Metabolic Panel; Future  -     CBC Auto Differential; Future  -     Iron and TIBC; Future    Chronic pain of right knee  -     TSH; Future  -     Vitamin B12; Future  -     Lipid Panel; Future  -     Hemoglobin A1C; Future  -     Comprehensive Metabolic Panel; Future  -     CBC Auto Differential; Future  -     Iron and TIBC; Future    Screening for prostate cancer  -     TSH; Future  -     PSA, Screening; Future  -     Vitamin B12; Future  -     Lipid Panel; Future  -     Hemoglobin A1C; Future  -     Comprehensive Metabolic Panel; Future  -     CBC Auto Differential; Future  -     Iron and TIBC; Future    B12 deficiency  -     TSH; Future  -     Vitamin B12; Future  -     Lipid Panel; Future  -     Hemoglobin A1C; Future  -     Comprehensive Metabolic Panel; Future  -     CBC Auto Differential; Future  -     Iron and TIBC; Future    Other iron deficiency anemia  -     TSH; Future  -     Vitamin B12; Future  -     Lipid Panel; Future  -     Hemoglobin A1C; Future  -     Comprehensive Metabolic Panel; Future  -     CBC Auto Differential; Future  -     Iron and TIBC; Future    Other orders  -     amLODIPine (NORVASC) 5 MG tablet; Take 1 tablet (5 mg total) by mouth once daily.  -     atorvastatin (LIPITOR) 10 MG tablet; Take 1 tablet (10 mg total) by mouth once daily.  -     omeprazole (PRILOSEC) 40 MG capsule; Take 1 capsule (40 mg total) by mouth every morning.  -     ferrous sulfate (FEOSOL) 325 mg (65 mg iron) Tab tablet; Take 1 tablet (325 mg total) by mouth once daily.           Follow-up labs 1 year sooner p.r.n.  Continue meds          Portions of this note may have been created with voice recognition software. Occasional "wrong-word" or "sound-a-like" substitutions may have occurred due to the inherent limitations of voice recognition software. Please, read the note carefully and recognize, using context, where substitutions have occurred.  "

## 2024-03-12 ENCOUNTER — CLINICAL SUPPORT (OUTPATIENT)
Dept: REHABILITATION | Facility: HOSPITAL | Age: 84
End: 2024-03-12
Payer: MEDICARE

## 2024-03-12 DIAGNOSIS — G89.29 CHRONIC PAIN OF LEFT KNEE: ICD-10-CM

## 2024-03-12 DIAGNOSIS — M25.561 CHRONIC PAIN OF RIGHT KNEE: Primary | ICD-10-CM

## 2024-03-12 DIAGNOSIS — G89.29 CHRONIC PAIN OF RIGHT KNEE: Primary | ICD-10-CM

## 2024-03-12 DIAGNOSIS — M25.562 CHRONIC PAIN OF LEFT KNEE: ICD-10-CM

## 2024-03-12 PROCEDURE — 97530 THERAPEUTIC ACTIVITIES: CPT | Mod: CQ

## 2024-03-12 PROCEDURE — 97112 NEUROMUSCULAR REEDUCATION: CPT | Mod: CQ

## 2024-03-12 NOTE — PROGRESS NOTES
[facility]   Physical Therapy Treatment Note     Name: Adrian Healy  Clinic Number: [id number]    Therapy Diagnosis:   Encounter Diagnoses   Name Primary?    Chronic pain of right knee Yes    Chronic pain of left knee      Physician: Dev Frankel III, *    Visit Date: 3/12/2024    Physician Orders: PT Eval and Treat   Medical Diagnosis from Referral: M17.11 (ICD-10-CM) - Patellofemoral arthritis of right knee   Evaluation Date: 3/7/2024  Authorization Period Expiration: 12/31/2024  Plan of Care Expiration: 5/7/2024  Progress Note Due: 5/7/2024  Visit # / Visits authorized: 1 / 20 + eval   FOTO: 1/3     PTA Visit #: 1 / 5     Time In: 1410  Time Out: 1500  Total Treatment Time: 50 minutes  Total Billable Time: 50 minutes (3 NMR, 1 TA)    Precautions: Standard    SUBJECTIVE     Patient reports: he is doing ok.  He was compliant with home exercise program.  Response to previous treatment: appropriate muscle response  Functional change: ongoing    Pain: 0/10, currently  Location: Right knee    Patient goals: ADLs w/o complaint / return to gym    OBJECTIVE     Objective Measures updated at progress report unless specified.     Treatment     Adrian received the treatments listed below:      neuromuscular re-education activities to improve: Balance, Coordination, Kinesthetic, Sense, Proprioception, and Posture for 40 minutes. The following activities were included:     Patient education:  - diagnosis, prognosis, impairments  - importance of quad NM activation and strength  - HEP     Quad set towel; 20 reps with 10 second hold Bilateral  ASLR 15x5 sec ea andrea - cue DF if needed   HL Bridge with Yellow TB; 3x10x5 sec hold  SL Clams; 3 x 10 reps Bilateral     therapeutic activities to improve functional performance for 10 minutes, including:   Nustep for endogenous release of opioids for 10 minutes, Level 5    Patient Education and Home Exercises     Home Exercises Provided and Patient  Education Provided     Education provided:   - See above    Written Home Exercises Provided: Patient instructed to cont prior HEP. Exercises were reviewed and Adrian was able to demonstrate them prior to the end of the session.  Adrian demonstrated good  understanding of the education provided. See EMR under Patient Instructions for exercises provided during therapy sessions    ASSESSMENT     Adrian presents for first follow up after initial evaluation. He demonstrates good quad activation at this time however fatigues easily with quad focused exercises. Will continue per POC towards treatment goals.  Adrian Is progressing well towards his goals.   Pt prognosis is Good.     Pt will continue to benefit from skilled outpatient physical therapy to address the deficits listed in the problem list box on initial evaluation, provide pt/family education and to maximize pt's level of independence in the home and community environment.     Pt's spiritual, cultural and educational needs considered and pt agreeable to plan of care and goals.     Anticipated barriers to physical therapy: chronicity    Goals:   Short Term Goals:  0-4 weeks  1.Report decreased R knee pain  < / =  4/10 at worst to increase tolerance for ADLs  2. Increase knee ROM to full and pain free in order to be able to perform ADLs without difficulty.  3. Increase strength by 1/3 MMT grade in quad  to increase tolerance for ADL and work activities.  4. Pt to tolerate HEP to improve ROM and independence with ADL's     Long Term Goals: 5-8 weeks  1.Report decreased R knee pain < / = 2/10 at worst to increase tolerance for ADLs  2.Patient goal: ADLs w/o complaint / return to gym  3.Increase strength to >/= 4+/5 in quad  to increase tolerance for ADL and work activities.  4. Pt will report at CJ level (20-40% impaired) on FOTO knee to demonstrate increase in LE function with every day tasks.     PLAN     Plan of Care Certification: 3/7/2024 to 5/7/2024.      Outpatient Physical Therapy 2 times weekly for 6-8 weeks to include the following interventions: Gait Training, Manual Therapy, Moist Heat/ Ice, Neuromuscular Re-ed, Patient Education, Self Care, Therapeutic Activities, and Therapeutic Exercise.     PT/PTA met face to face to discuss patient's treatment plan and progress towards established goals. Patient will be seen by physical therapist every sixth visit and minimally once per month.    Heidi Rojas PTA

## 2024-03-14 ENCOUNTER — CLINICAL SUPPORT (OUTPATIENT)
Dept: REHABILITATION | Facility: HOSPITAL | Age: 84
End: 2024-03-14
Payer: MEDICARE

## 2024-03-14 DIAGNOSIS — G89.29 CHRONIC PAIN OF RIGHT KNEE: Primary | ICD-10-CM

## 2024-03-14 DIAGNOSIS — G89.29 CHRONIC PAIN OF LEFT KNEE: ICD-10-CM

## 2024-03-14 DIAGNOSIS — M25.562 CHRONIC PAIN OF LEFT KNEE: ICD-10-CM

## 2024-03-14 DIAGNOSIS — M25.561 CHRONIC PAIN OF RIGHT KNEE: Primary | ICD-10-CM

## 2024-03-14 PROCEDURE — 97112 NEUROMUSCULAR REEDUCATION: CPT

## 2024-03-14 NOTE — PROGRESS NOTES
[facility]   Physical Therapy Treatment Note     Name: Adrian Healy  Clinic Number: [id number]    Therapy Diagnosis:   Encounter Diagnoses   Name Primary?    Chronic pain of right knee Yes    Chronic pain of left knee        Physician: Dev Frankel III, *    Visit Date: 3/14/2024    Physician Orders: PT Eval and Treat   Medical Diagnosis from Referral: M17.11 (ICD-10-CM) - Patellofemoral arthritis of right knee   Evaluation Date: 3/7/2024  Authorization Period Expiration: 12/31/2024  Plan of Care Expiration: 5/7/2024  Progress Note Due: 5/7/2024  Visit # / Visits authorized: 2 / 20 + eval    FOTO: 1/3     PTA Visit #: 1 / 5     Time In: 1300  Time Out:  1400  Total Treatment Time: 60 minutes  Total Billable Time: 60 minutes     Precautions: Standard    SUBJECTIVE     Patient reports: felt some irritation in medial kneecap post Nustep. Other than this no complaints w/ HEP.    He was compliant with home exercise program.  Response to previous treatment: appropriate muscle response  Functional change: ongoing    Pain: 0/10, currently  Location: Right knee    Patient goals: ADLs w/o complaint / return to gym    OBJECTIVE     Objective Measures updated at progress report unless specified.     Lower Extremity Strength:  Right LE   Left LE     Quadriceps: 4-/5 * pain + lag SLR Quadriceps: 4/5 + lag SLR   Hamstrings: 4+/5 Hamstrings: 4+/5   Hip Flexion: 3+/5 Hip Flexion: 4-/5   Hip Extension:  3-/5 Hip Extension: 3/5   Hip ABD: 3-/5 Hip ABD: 3/5     Flexibility:               90/90 Hamstrings: R = +++ ; L = +++               Mariely's test: R = - ; L = -              Prone knee bend (RF): R = +; L = +    Treatment     Adrian received the treatments listed below:      neuromuscular re-education activities to improve: Balance, Coordination, Kinesthetic, Sense, Proprioception, and Posture for 60 minutes. The following activities were included:     Patient education:  - diagnosis, prognosis,  impairments  - importance of quad NM activation and strength  - cuing for form  - HEP    Quad set towel; 30 reps with 10 second hold Bilateral  ASLR 3x10x5 sec ea andrea - cue DF if needed   HL Bridge with YTB looped; 3x10x5 sec hold  SL Clams YTB looped; 3x10x5 sec hold ea andrea      NEXT VISIT - hold on NuStep / Bike; cont focus on SLR progressions / HS and calf stretching; can trial SAQ with long hold times       therapeutic activities to improve functional performance for 00 minutes, including:     Held:  Nustep for endogenous release of opioids for 10 minutes, Level 5    Patient Education and Home Exercises     Home Exercises Provided and Patient Education Provided     Education provided:   - See above    Written Home Exercises Provided: Patient instructed to cont prior HEP. Exercises were reviewed and Adrian was able to demonstrate them prior to the end of the session.  Adrian demonstrated good  understanding of the education provided. See EMR under Patient Instructions for exercises provided during therapy sessions    ASSESSMENT     Patient dianne all interventions well. Plan to hold on NuStep / Bike secondary to PFJ irritation with these. Requires consistent cuing for quad NM activation on quad sets and SLR along with significant quad strength deficits. Cont to require focus on this going forward.    Adrian Is progressing well towards his goals.   Pt prognosis is Good.     Pt will continue to benefit from skilled outpatient physical therapy to address the deficits listed in the problem list box on initial evaluation, provide pt/family education and to maximize pt's level of independence in the home and community environment.     Pt's spiritual, cultural and educational needs considered and pt agreeable to plan of care and goals.     Anticipated barriers to physical therapy: chronicity    Goals:   Short Term Goals:  0-4 weeks  1.Report decreased R knee pain  < / =  4/10 at worst to increase tolerance for ADLs  2.  Increase knee ROM to full and pain free in order to be able to perform ADLs without difficulty.  3. Increase strength by 1/3 MMT grade in quad  to increase tolerance for ADL and work activities.  4. Pt to tolerate HEP to improve ROM and independence with ADL's     Long Term Goals: 5-8 weeks  1.Report decreased R knee pain < / = 2/10 at worst to increase tolerance for ADLs  2.Patient goal: ADLs w/o complaint / return to gym  3.Increase strength to >/= 4+/5 in quad  to increase tolerance for ADL and work activities.  4. Pt will report at CJ level (20-40% impaired) on FOTO knee to demonstrate increase in LE function with every day tasks.     PLAN     Plan of Care Certification: 3/7/2024 to 5/7/2024.     Outpatient Physical Therapy 2 times weekly for 6-8 weeks to include the following interventions: Gait Training, Manual Therapy, Moist Heat/ Ice, Neuromuscular Re-ed, Patient Education, Self Care, Therapeutic Activities, and Therapeutic Exercise.     PT/PTA met face to face to discuss patient's treatment plan and progress towards established goals. Patient will be seen by physical therapist every sixth visit and minimally once per month.    MARTA ROCHA, PT, DPT, OCS

## 2024-03-18 ENCOUNTER — OFFICE VISIT (OUTPATIENT)
Dept: ELECTROPHYSIOLOGY | Facility: CLINIC | Age: 84
End: 2024-03-18
Payer: COMMERCIAL

## 2024-03-18 ENCOUNTER — CLINICAL SUPPORT (OUTPATIENT)
Dept: REHABILITATION | Facility: HOSPITAL | Age: 84
End: 2024-03-18
Payer: MEDICARE

## 2024-03-18 ENCOUNTER — HOSPITAL ENCOUNTER (OUTPATIENT)
Dept: CARDIOLOGY | Facility: CLINIC | Age: 84
Discharge: HOME | End: 2024-03-18
Payer: COMMERCIAL

## 2024-03-18 VITALS
SYSTOLIC BLOOD PRESSURE: 102 MMHG | BODY MASS INDEX: 28.72 KG/M2 | HEART RATE: 62 BPM | HEIGHT: 73 IN | DIASTOLIC BLOOD PRESSURE: 58 MMHG | WEIGHT: 216.69 LBS

## 2024-03-18 DIAGNOSIS — G89.29 CHRONIC PAIN OF LEFT KNEE: ICD-10-CM

## 2024-03-18 DIAGNOSIS — E78.2 MIXED HYPERLIPIDEMIA: ICD-10-CM

## 2024-03-18 DIAGNOSIS — K21.9 GASTROESOPHAGEAL REFLUX DISEASE, UNSPECIFIED WHETHER ESOPHAGITIS PRESENT: ICD-10-CM

## 2024-03-18 DIAGNOSIS — I48.0 PAROXYSMAL ATRIAL FIBRILLATION: ICD-10-CM

## 2024-03-18 DIAGNOSIS — M25.562 CHRONIC PAIN OF LEFT KNEE: ICD-10-CM

## 2024-03-18 DIAGNOSIS — I10 PRIMARY HYPERTENSION: ICD-10-CM

## 2024-03-18 DIAGNOSIS — M25.561 CHRONIC PAIN OF RIGHT KNEE: Primary | ICD-10-CM

## 2024-03-18 DIAGNOSIS — M17.0 PRIMARY OSTEOARTHRITIS OF BOTH KNEES: ICD-10-CM

## 2024-03-18 DIAGNOSIS — G89.29 CHRONIC PAIN OF RIGHT KNEE: ICD-10-CM

## 2024-03-18 DIAGNOSIS — E66.3 OVERWEIGHT (BMI 25.0-29.9): ICD-10-CM

## 2024-03-18 DIAGNOSIS — F41.9 ANXIETY: ICD-10-CM

## 2024-03-18 DIAGNOSIS — G89.29 CHRONIC PAIN OF RIGHT KNEE: Primary | ICD-10-CM

## 2024-03-18 DIAGNOSIS — H90.3 SENSORINEURAL HEARING LOSS (SNHL) OF BOTH EARS: ICD-10-CM

## 2024-03-18 DIAGNOSIS — M25.561 CHRONIC PAIN OF RIGHT KNEE: ICD-10-CM

## 2024-03-18 DIAGNOSIS — I48.0 PAROXYSMAL ATRIAL FIBRILLATION: Primary | ICD-10-CM

## 2024-03-18 LAB
OHS QRS DURATION: 94 MS
OHS QTC CALCULATION: 406 MS

## 2024-03-18 PROCEDURE — 3074F SYST BP LT 130 MM HG: CPT | Mod: CPTII,S$GLB,, | Performed by: STUDENT IN AN ORGANIZED HEALTH CARE EDUCATION/TRAINING PROGRAM

## 2024-03-18 PROCEDURE — 1126F AMNT PAIN NOTED NONE PRSNT: CPT | Mod: CPTII,S$GLB,, | Performed by: STUDENT IN AN ORGANIZED HEALTH CARE EDUCATION/TRAINING PROGRAM

## 2024-03-18 PROCEDURE — 3288F FALL RISK ASSESSMENT DOCD: CPT | Mod: CPTII,S$GLB,, | Performed by: STUDENT IN AN ORGANIZED HEALTH CARE EDUCATION/TRAINING PROGRAM

## 2024-03-18 PROCEDURE — 3078F DIAST BP <80 MM HG: CPT | Mod: CPTII,S$GLB,, | Performed by: STUDENT IN AN ORGANIZED HEALTH CARE EDUCATION/TRAINING PROGRAM

## 2024-03-18 PROCEDURE — 93005 ELECTROCARDIOGRAM TRACING: CPT | Mod: S$GLB,,, | Performed by: STUDENT IN AN ORGANIZED HEALTH CARE EDUCATION/TRAINING PROGRAM

## 2024-03-18 PROCEDURE — 97112 NEUROMUSCULAR REEDUCATION: CPT

## 2024-03-18 PROCEDURE — 1101F PT FALLS ASSESS-DOCD LE1/YR: CPT | Mod: CPTII,S$GLB,, | Performed by: STUDENT IN AN ORGANIZED HEALTH CARE EDUCATION/TRAINING PROGRAM

## 2024-03-18 PROCEDURE — 1159F MED LIST DOCD IN RCRD: CPT | Mod: CPTII,S$GLB,, | Performed by: STUDENT IN AN ORGANIZED HEALTH CARE EDUCATION/TRAINING PROGRAM

## 2024-03-18 PROCEDURE — 99999 PR PBB SHADOW E&M-EST. PATIENT-LVL III: CPT | Mod: PBBFAC,,, | Performed by: STUDENT IN AN ORGANIZED HEALTH CARE EDUCATION/TRAINING PROGRAM

## 2024-03-18 PROCEDURE — 99214 OFFICE O/P EST MOD 30 MIN: CPT | Mod: S$GLB,,, | Performed by: STUDENT IN AN ORGANIZED HEALTH CARE EDUCATION/TRAINING PROGRAM

## 2024-03-18 PROCEDURE — 93010 ELECTROCARDIOGRAM REPORT: CPT | Mod: S$GLB,,, | Performed by: INTERNAL MEDICINE

## 2024-03-18 NOTE — PROGRESS NOTES
[facility]   Physical Therapy Treatment Note     Name: Adrian Healy  Clinic Number: [id number]    Therapy Diagnosis:   Encounter Diagnoses   Name Primary?    Chronic pain of right knee Yes    Chronic pain of left knee        Physician: Dev Frankel III, *    Visit Date: 3/18/2024    Physician Orders: PT Eval and Treat   Medical Diagnosis from Referral: M17.11 (ICD-10-CM) - Patellofemoral arthritis of right knee   Evaluation Date: 3/7/2024  Authorization Period Expiration: 12/31/2024  Plan of Care Expiration: 5/7/2024  Progress Note Due: 5/7/2024  Visit # / Visits authorized: 3 / 20 + eval    FOTO: 1/3     PTA Visit #: 1 / 5     Time In: 1300   Time Out:  1400  Total Treatment Time: 60 minutes  Total Billable Time: 60 minutes     Precautions: Standard    SUBJECTIVE     Patient reports: Feeling stronger since last seen. Min knee complaints.    He was compliant with home exercise program.  Response to previous treatment: appropriate muscle response  Functional change: ongoing    Pain: 0/10, currently  Location: Right knee    Patient goals: ADLs w/o complaint / return to gym    OBJECTIVE     Objective Measures updated at progress report unless specified.     Lower Extremity Strength:  Right LE   Left LE     Quadriceps: 4-/5 * pain + lag SLR Quadriceps: 4/5 + lag SLR   Hamstrings: 4+/5 Hamstrings: 4+/5   Hip Flexion: 3+/5 Hip Flexion: 4-/5   Hip Extension:  3-/5 Hip Extension: 3/5   Hip ABD: 3-/5 Hip ABD: 3/5     Flexibility:               90/90 Hamstrings: R = +++ ; L = +++               Mariely's test: R = - ; L = -              Prone knee bend (RF): R = +; L = +    Treatment     Adrian received the treatments listed below:      neuromuscular re-education activities to improve: Balance, Coordination, Kinesthetic, Sense, Proprioception, and Posture for 60 minutes. The following activities were included:     Patient education:  - diagnosis, prognosis, impairments  - importance of quad NM  activation and strength  - cuing for form  - HEP    HS leg off table stretch 3x30 sec ea    Quad set towel; 30 reps with 10 second hold Bilateral  ASLR w/ 2.5lbs 3x10x5 sec ea andrea - cue DF if needed     Prone HS curl 3x10 ea andrea - cue eccentric    HL Bridge with YTB looped; 3x10x5 sec hold  SL Clams YTB looped; 3x10x5 sec hold ea andrea      NEXT VISIT - hold on NuStep / Bike; cont focus on SLR progressions / HS and calf stretching  FUTURE VISIT - lateral walk / shuttle       therapeutic activities to improve functional performance for 00 minutes, including:     Held:  Nustep for endogenous release of opioids for 10 minutes, Level 5    Patient Education and Home Exercises     Home Exercises Provided and Patient Education Provided     Education provided:   - See above    Written Home Exercises Provided: Patient instructed to cont prior HEP. Exercises were reviewed and Adrian was able to demonstrate them prior to the end of the session.  Adrian demonstrated good  understanding of the education provided. See EMR under Patient Instructions for exercises provided during therapy sessions    ASSESSMENT     Patient dianne all interventions well and slowly is demonstrating improved quad NM activation, but had lag with 2.5lbs on SLR. Cont to require focus on quad NM activation in this form going forward.     Plan to cont to hold on NuStep / Bike secondary to PFJ irritation with these.     Adrian Is progressing well towards his goals.   Pt prognosis is Good.     Pt will continue to benefit from skilled outpatient physical therapy to address the deficits listed in the problem list box on initial evaluation, provide pt/family education and to maximize pt's level of independence in the home and community environment.     Pt's spiritual, cultural and educational needs considered and pt agreeable to plan of care and goals.     Anticipated barriers to physical therapy: chronicity    Goals:   Short Term Goals:  0-4 weeks  1.Report  decreased R knee pain  < / =  4/10 at worst to increase tolerance for ADLs  2. Increase knee ROM to full and pain free in order to be able to perform ADLs without difficulty.  3. Increase strength by 1/3 MMT grade in quad  to increase tolerance for ADL and work activities.  4. Pt to tolerate HEP to improve ROM and independence with ADL's     Long Term Goals: 5-8 weeks  1.Report decreased R knee pain < / = 2/10 at worst to increase tolerance for ADLs  2.Patient goal: ADLs w/o complaint / return to gym  3.Increase strength to >/= 4+/5 in quad  to increase tolerance for ADL and work activities.  4. Pt will report at CJ level (20-40% impaired) on FOTO knee to demonstrate increase in LE function with every day tasks.     PLAN     Plan of Care Certification: 3/7/2024 to 5/7/2024.     Outpatient Physical Therapy 2 times weekly for 6-8 weeks to include the following interventions: Gait Training, Manual Therapy, Moist Heat/ Ice, Neuromuscular Re-ed, Patient Education, Self Care, Therapeutic Activities, and Therapeutic Exercise.     PT/PTA met face to face to discuss patient's treatment plan and progress towards established goals. Patient will be seen by physical therapist every sixth visit and minimally once per month.    MARTA ROCHA, PT, DPT, OCS

## 2024-03-18 NOTE — PROGRESS NOTES
"Electrophysiology Clinic Note    Reason for follow-up patient visit: Ongoing evaluation and recommendations regarding persistent atrial fibrillation, s/p successful SOILA and cardioversion on 6/12/2023.     PRESENTING HISTORY:     History of Present Illness:  Mr. Adrian Healy is a alicia 83-year-old gentleman who returns to clinic today for ongoing evaluation and recommendations regarding a persistent atrial fibrillation, s/p successful SOILA and cardioversion on 6/12/2023. He has a past medical history significant for persistent atrial fibrillation noted during an outpatient EGD, s/p successful SOILA and cardioversion on 6/12/2023, GERD with a history of gastric polyps, hypertension, hyperlipidemia, hearing loss, and overweight BMI. Following his cardioversion he was initiated on flecainide 100mg po BID in addition to uninterrupted oral anticoagulation with apixaban, and remains in sinus rhythm on assessment today.     In brief review of his atrial fibrillation history, he presented for an outpatient EGD to monitor his gastric polyps on 5/1/2023. Per chart review, he was noted to be in rate-controlled atrial fibrillation. He was not told that he was in atrial fibrillation at his prior colonoscopy about one month prior in April 2023; however, he does not think they obtained a 12-lead ECG. While in the PACU, he reported that he was asymptomatic. He was subsequently seen in general cardiology with Naye Pillai and Samara on 5/23/2023. At that encounter, he remained in rate-controlled atrial fibrillation. He was started on oral anticoagulation with apixaban and denies any adverse bleeding events. At our prior encounter, we discussed undergoing a SOILA and cardioversion for restoration of sinus rhythm. This was successfully performed on 6/12/2023 with initiation of flecainide 100mg po BID in addition to continued oral anticoagulation with apixaban. Since undergoing his cardioversion, he reports that he feels "much better", and " "that he has increased exercise tolerance and improved shortness of breath. He remains in sinus rhythm on assessment today. He has recently started physical therapy for his right knee pain and has been participating for about 8 weeks. He reports improved mobility and denies any limitations to exercise.      In discussion with Mr. Healy today, he tells me that he is feeling overall quite well. He denies any episodes of dizziness, lightheadedness, syncope/presyncope, chest pain or chest discomfort, nausea or vomiting, orthopnea, or PND. He reports that when he was in atrial fibrillation, he felt mild exercise intolerance, especially noted with household chores such as mowing his lawn. He was having to take frequent breaks, which was a new symptom for him. He reported very brief "chest flutters" that would last for a matter of seconds. His baseline shortness of breath was worse while in atrial fibrillation. Since undergoing cardioversion he has been able to resume his baseline physical activity without limitation. He can climb more than one flight of stairs prior to needing to take a break, and usually is able to ride his stationary bicycle for several miles without limitation.     Review of Systems:  Review of Systems   Constitutional:  Negative for activity change.        Mild exercise intolerance while in atrial fibrillation.    HENT:  Negative for nasal congestion, nosebleeds, postnasal drip, rhinorrhea, sinus pressure/congestion, sneezing and sore throat.    Respiratory:  Positive for shortness of breath. Negative for apnea, cough, chest tightness and wheezing.    Cardiovascular:  Negative for chest pain, palpitations and leg swelling.   Gastrointestinal:  Negative for abdominal distention, abdominal pain, blood in stool, change in bowel habit, constipation, diarrhea, nausea and vomiting.   Genitourinary:  Negative for dysuria and hematuria.   Musculoskeletal:  Positive for arthralgias and back pain. Negative for " gait problem.   Neurological:  Negative for dizziness, seizures, syncope, weakness, light-headedness, headaches and coordination difficulties.        PAST HISTORY:     Past Medical History:   Diagnosis Date    GERD (gastroesophageal reflux disease)     HEARING LOSS     Hyperlipidemia     Hypertension        Past Surgical History:   Procedure Laterality Date    COLONOSCOPY N/A 7/19/2018    Procedure: COLONOSCOPY;  Surgeon: Abilio Ferrara MD;  Location: [facility] (4TH FLR);  Service: Endoscopy;  Laterality: N/A;    COLONOSCOPY N/A 4/27/2022    Procedure: COLONOSCOPY;  Surgeon: Rudy Grace MD;  Location: [facility] (4TH FLR);  Service: Endoscopy;  Laterality: N/A;  fully vaccinated    ECHOCARDIOGRAM,TRANSESOPHAGEAL N/A 6/12/2023    Procedure: Transesophageal echo (SOILA) intra-procedure log documentation;  Surgeon: [facility];  Location: [facility] EP LAB;  Service: Cardiology;  Laterality: N/A;    ESOPHAGOGASTRODUODENOSCOPY N/A 3/21/2023    Procedure: ESOPHAGOGASTRODUODENOSCOPY (EGD);  Surgeon: Wilmar Ordaz MD;  Location: [facility] (4TH FLR);  Service: Endoscopy;  Laterality: N/A;  [facility] portal-tb    ESOPHAGOGASTRODUODENOSCOPY N/A 5/1/2023    Procedure: EGD (ESOPHAGOGASTRODUODENOSCOPY);  Surgeon: Ajay Swain MD;  Location: [facility] (2ND FLR);  Service: Endoscopy;  Laterality: N/A;  4/13/23-Instructions via portal-DS  4/25 pre call no answer    ESOPHAGOGASTRODUODENOSCOPY N/A 11/28/2023    Procedure: EGD (ESOPHAGOGASTRODUODENOSCOPY);  Surgeon: Ajay Swain MD;  Location: [facility] (2ND FLR);  Service: Endoscopy;  Laterality: N/A;  Approval to hold Eliis rec'd from Dr. Pillai (see telephone encounter 10/27/23)-DS  10/27/23-Instructions via portal-DS  11/22/23-Precall complete-DS    EYE SURGERY      TREATMENT OF CARDIAC ARRHYTHMIA N/A 6/12/2023    Procedure: Cardioversion or Defibrillation;  Surgeon: SHARI Lagunas MD;  Location: [facility] EP LAB;  Service: Cardiology;  Laterality: N/A;  AF,  "SOILA, DCCV, MAC, EH, 3 Prep       Family History:  Family History   Problem Relation Age of Onset    Liver cancer Sister     Melanoma Sister     Cancer Brother     Heart disease Brother         pacemaker    Colon cancer Brother     Prostate cancer Brother        Social History:  He  reports that he quit smoking about 26 years ago. His smoking use included cigarettes. He started smoking about 56 years ago. He has a 30.0 pack-year smoking history. He quit smokeless tobacco use about 26 years ago. He reports current alcohol use of about 5.8 standard drinks of alcohol per week. He reports that he does not use drugs.      MEDICATIONS & ALLERGIES:     Review of patient's allergies indicates:  No Known Allergies    Current Outpatient Medications on File Prior to Visit   Medication Sig Dispense Refill    amLODIPine (NORVASC) 5 MG tablet Take 1 tablet (5 mg total) by mouth once daily. 90 tablet 3    apixaban (ELIQUIS) 5 mg Tab Take 1 tablet (5 mg total) by mouth 2 (two) times daily. 60 tablet 1    atorvastatin (LIPITOR) 10 MG tablet Take 1 tablet (10 mg total) by mouth once daily. 90 tablet 3    cetirizine (ZYRTEC) 5 MG tablet Take 1 tablet (5 mg total) by mouth once daily. 90 tablet 3    citalopram (CELEXA) 20 MG tablet TAKE 1 TABLET BY MOUTH EVERY DAY 90 tablet 0    ferrous sulfate (FEOSOL) 325 mg (65 mg iron) Tab tablet Take 1 tablet (325 mg total) by mouth once daily. 30 tablet 3    flecainide (TAMBOCOR) 100 MG Tab TAKE 1 TABLET BY MOUTH EVERY 12 HOURS 180 tablet 3    hydrocortisone 2.5 % cream Apply topically 2 (two) times daily. 20 g 3    omeprazole (PRILOSEC) 40 MG capsule Take 1 capsule (40 mg total) by mouth every morning. 90 capsule 3     No current facility-administered medications on file prior to visit.        OBJECTIVE:     Vital Signs:  BP (!) 102/58   Pulse 62   Ht 6' 1" (1.854 m)   Wt 98.3 kg (216 lb 11.4 oz)   BMI 28.59 kg/m²     Physical Exam:  Physical Exam  Constitutional:       General: He is not in " acute distress.     Appearance: Normal appearance. He is not ill-appearing or diaphoretic.      Comments: Well-appearing man in NAD.   HENT:      Head: Normocephalic and atraumatic.      Nose: Nose normal.      Mouth/Throat:      Mouth: Mucous membranes are moist.      Pharynx: Oropharynx is clear.   Eyes:      Pupils: Pupils are equal, round, and reactive to light.   Cardiovascular:      Rate and Rhythm: Normal rate and regular rhythm.      Pulses: Normal pulses.      Heart sounds: Normal heart sounds. No murmur heard.     No friction rub. No gallop.   Pulmonary:      Effort: Pulmonary effort is normal. No respiratory distress.      Breath sounds: Normal breath sounds. No wheezing, rhonchi or rales.   Chest:      Chest wall: No tenderness.   Abdominal:      General: There is no distension.      Palpations: Abdomen is soft.      Tenderness: There is no abdominal tenderness.   Musculoskeletal:         General: No swelling or tenderness.      Cervical back: Normal range of motion.      Right lower leg: No edema.      Left lower leg: No edema.   Skin:     General: Skin is warm and dry.      Findings: No erythema, lesion or rash.   Neurological:      General: No focal deficit present.      Mental Status: He is alert and oriented to person, place, and time. Mental status is at baseline.      Motor: No weakness.      Gait: Gait normal.   Psychiatric:         Mood and Affect: Mood normal.         Behavior: Behavior normal.          Laboratory Data:  Lab Results   Component Value Date    WBC 6.33 03/08/2024    HGB 13.1 (L) 03/08/2024    HCT 40.4 03/08/2024    MCV 92 03/08/2024     03/08/2024     Lab Results   Component Value Date    GLU 92 03/08/2024     03/08/2024    K 4.0 03/08/2024     03/08/2024    CO2 25 03/08/2024    BUN 12 03/08/2024    CREATININE 1.1 03/08/2024    CALCIUM 9.6 03/08/2024    MG 1.7 05/01/2023     Lab Results   Component Value Date    INR 1.1 06/09/2023       Pertinent Cardiac  Data:  ECG: Normal sinus rhythm with first degree AV block, rate of 62 bpm,  ms, QRS 94 ms, QT/QTc 400/406 ms, nonspecific STT changes.     Resting 2D Transthoracic Echocardiogram - 6/9/2023:  The left ventricle is normal in size with mildly decreased systolic function. The estimated ejection fraction is 50%.  Mild right ventricular enlargement with normal right ventricular systolic function.  Severe left atrial enlargement.  The estimated PA systolic pressure is 24 mmHg.  Normal central venous pressure (3 mmHg).  Atrial fibrillation observed.    SOILA and DCCV - 6/12/2023:  SOILA performed prior to DCCV.  Normal appearing left atrial appendage. No thrombus is present in the appendage. Abnormal appendage velocities.  The left ventricle is normal in size with normal systolic function.  The estimated ejection fraction is 50%.  Normal right ventricular size with normal right ventricular systolic function.  Mild mitral regurgitation.  Grade 2 plaque present.      ASSESSMENT & PLAN:   Mr. Adrian Healy is a alicia 83-year-old gentleman who returns to clinic today for ongoing evaluation and recommendations regarding a persistent atrial fibrillation, s/p successful SOILA and cardioversion on 6/12/2023. He has a past medical history significant for persistent atrial fibrillation noted during an outpatient EGD, s/p successful SOILA and cardioversion on 6/12/2023, GERD with a history of gastric polyps, hypertension, hyperlipidemia, hearing loss, and overweight BMI. Following his cardioversion he was initiated on flecainide 100mg po BID in addition to uninterrupted oral anticoagulation with apixaban, and remains in sinus rhythm on assessment today.     - We discussed the pathophysiology of atrial fibrillation; specifically, we discussed paroxysmal atrial fibrillation as it differs from persistent atrial fibrillation. We discussed that the longer a patient remains in atrial fibrillation, the more challenging it may be for the  patient to return and sustain sinus rhythm. We discussed that atrial fibrillation has an increased risk of CVA.    - He feels that since undergoing his successful cardioversion that he has remained in sinus rhythm and reports that he feel significantly improved. He remains in sinus rhythm on all subsequent ECGs since his cardioversion.   - He remains on flecainide 100mg po BID with excellent control of his atrial fibrillation. Jermain suppression has not been added in the setting of asymptomatic bradycardia. He has no history of underlying coronary artery disease, although this has not been formally assessed. His baseline QRSd remains narrow at 94ms. He has preserved renal function. His systolic function remains preserved with low-normal LVEF 50%. He has not captured any subsequent events of atrial fibrillation on his [employer] mobile francis.   - His [id number] is 3 (HTN, male gender, age greater than 75), portending an annual adjusted risk of CVA of 3.2%. He remains on uninterrupted apixaban 5 mg po BID with no adverse bleeding events reported.   - Possible underlying drivers of atrial fibrillation were addressed at this appointment, including recommendations for maintenance of a healthy BMI - now a class I recommendation. Review of laboratory data reveals acceptable TSH at 0.831. We discussed the role that obstructive sleep apnea may play in atrial fibrillation. A sleep study may be considered at a future encounter to evaluate for possible JACOBO.       This patient will return to clinic with me in six months with continued flecainide antiarrhythmic therapy and apixaban oral anticoagulation. All questions and concerns were addressed at this encounter.     Signing Physician:       PAULINE Lagunas MD  Electrophysiology Attending

## 2024-03-21 ENCOUNTER — CLINICAL SUPPORT (OUTPATIENT)
Dept: REHABILITATION | Facility: HOSPITAL | Age: 84
End: 2024-03-21
Payer: MEDICARE

## 2024-03-21 DIAGNOSIS — M25.562 CHRONIC PAIN OF LEFT KNEE: ICD-10-CM

## 2024-03-21 DIAGNOSIS — G89.29 CHRONIC PAIN OF LEFT KNEE: ICD-10-CM

## 2024-03-21 DIAGNOSIS — M25.561 CHRONIC PAIN OF RIGHT KNEE: Primary | ICD-10-CM

## 2024-03-21 DIAGNOSIS — G89.29 CHRONIC PAIN OF RIGHT KNEE: Primary | ICD-10-CM

## 2024-03-21 PROCEDURE — 97112 NEUROMUSCULAR REEDUCATION: CPT | Mod: CQ

## 2024-03-21 NOTE — PROGRESS NOTES
[facility]   Physical Therapy Treatment Note     Name: Adrian Healy  Clinic Number: [id number]    Therapy Diagnosis:   Encounter Diagnoses   Name Primary?    Chronic pain of right knee Yes    Chronic pain of left knee      Physician: Dev Frankel III, *    Visit Date: 3/21/2024    Physician Orders: PT Eval and Treat   Medical Diagnosis from Referral: M17.11 (ICD-10-CM) - Patellofemoral arthritis of right knee   Evaluation Date: 3/7/2024  Authorization Period Expiration: 12/31/2024  Plan of Care Expiration: 5/7/2024  Progress Note Due: 5/7/2024  Visit # / Visits authorized: 4 / 20 + eval    FOTO: 1/3     PTA Visit #: 1 / 5     Time In: 1323   Time Out:  1438  Total Treatment Time: 66 minutes  Total Billable Time: 66 minutes     Precautions: Standard    SUBJECTIVE     Patient reports: he is doing ok today, no new complaints.  He was compliant with home exercise program.  Response to previous treatment: appropriate muscle response  Functional change: ongoing    Pain: 0/10, currently  Location: Right knee    Patient goals: ADLs w/o complaint / return to gym    OBJECTIVE     Objective Measures updated at progress report unless specified.     Lower Extremity Strength:  Right LE   Left LE     Quadriceps: 4-/5 * pain + lag SLR Quadriceps: 4/5 + lag SLR   Hamstrings: 4+/5 Hamstrings: 4+/5   Hip Flexion: 3+/5 Hip Flexion: 4-/5   Hip Extension:  3-/5 Hip Extension: 3/5   Hip ABD: 3-/5 Hip ABD: 3/5     Flexibility:               90/90 Hamstrings: R = +++ ; L = +++               Mariely's test: R = - ; L = -              Prone knee bend (RF): R = +; L = +    Treatment     Adrian received the treatments listed below:      neuromuscular re-education activities to improve: Balance, Coordination, Kinesthetic, Sense, Proprioception, and Posture for 66 minutes. The following activities were included:     Patient education:  - diagnosis, prognosis, impairments  - importance of quad NM activation and  strength  - cuing for form  - HEP    HS leg off table stretch 3x30 sec ea    Quad set towel; 30 reps with 10 second hold Bilateral  ASLR 3x10x5 sec ea andrea - cue DF if needed     Prone HS curl 3x10 ea andrea - cue eccentric    HL Bridge with YTB looped; 3x10x5 sec hold  SL Clams YTB looped; 3x10x5 sec hold ea andrea  Shuttle DL Press; 50 lbs, 3 x 10 reps    NEXT VISIT - hold on NuStep / Bike; cont focus on SLR progressions / HS and calf stretching  FUTURE VISIT - lateral walk / shuttle    therapeutic activities to improve functional performance for 00 minutes, including:   Nustep for endogenous release of opioids for 10 minutes, Level 5    Patient Education and Home Exercises     Home Exercises Provided and Patient Education Provided     Education provided:   - See above    Written Home Exercises Provided: Patient instructed to cont prior HEP. Exercises were reviewed and Adrian was able to demonstrate them prior to the end of the session.  Adrian demonstrated good  understanding of the education provided. See EMR under Patient Instructions for exercises provided during therapy sessions    ASSESSMENT     Overall good tolerance to therapeutic interventions noting adequate muscle response throughout. Decreased tissue extensibility observed with hamstring stretching. Difficulty with eccentric control during prone hamstring curl. Able to add double leg Shuttle press today with good tolerance. Will continue per POC towards treatment goals.  Adrian Is progressing well towards his goals.   Pt prognosis is Good.     Pt will continue to benefit from skilled outpatient physical therapy to address the deficits listed in the problem list box on initial evaluation, provide pt/family education and to maximize pt's level of independence in the home and community environment.     Pt's spiritual, cultural and educational needs considered and pt agreeable to plan of care and goals.     Anticipated barriers to physical therapy:  chronicity    Goals:   Short Term Goals:  0-4 weeks  1.Report decreased R knee pain  < / =  4/10 at worst to increase tolerance for ADLs  2. Increase knee ROM to full and pain free in order to be able to perform ADLs without difficulty.  3. Increase strength by 1/3 MMT grade in quad  to increase tolerance for ADL and work activities.  4. Pt to tolerate HEP to improve ROM and independence with ADL's     Long Term Goals: 5-8 weeks  1.Report decreased R knee pain < / = 2/10 at worst to increase tolerance for ADLs  2.Patient goal: ADLs w/o complaint / return to gym  3.Increase strength to >/= 4+/5 in quad  to increase tolerance for ADL and work activities.  4. Pt will report at CJ level (20-40% impaired) on FOTO knee to demonstrate increase in LE function with every day tasks.     PLAN     Plan of Care Certification: 3/7/2024 to 5/7/2024.     Outpatient Physical Therapy 2 times weekly for 6-8 weeks to include the following interventions: Gait Training, Manual Therapy, Moist Heat/ Ice, Neuromuscular Re-ed, Patient Education, Self Care, Therapeutic Activities, and Therapeutic Exercise.     PT/PTA met face to face to discuss patient's treatment plan and progress towards established goals. Patient will be seen by physical therapist every sixth visit and minimally once per month.    Heidi Rojas PTA

## 2024-03-23 PROBLEM — M17.0 PRIMARY OSTEOARTHRITIS OF BOTH KNEES: Status: ACTIVE | Noted: 2024-03-23

## 2024-03-25 ENCOUNTER — CLINICAL SUPPORT (OUTPATIENT)
Dept: REHABILITATION | Facility: HOSPITAL | Age: 84
End: 2024-03-25
Payer: MEDICARE

## 2024-03-25 DIAGNOSIS — M25.562 CHRONIC PAIN OF LEFT KNEE: ICD-10-CM

## 2024-03-25 DIAGNOSIS — G89.29 CHRONIC PAIN OF RIGHT KNEE: Primary | ICD-10-CM

## 2024-03-25 DIAGNOSIS — M25.561 CHRONIC PAIN OF RIGHT KNEE: Primary | ICD-10-CM

## 2024-03-25 DIAGNOSIS — G89.29 CHRONIC PAIN OF LEFT KNEE: ICD-10-CM

## 2024-03-25 PROCEDURE — 97112 NEUROMUSCULAR REEDUCATION: CPT

## 2024-03-25 PROCEDURE — 97530 THERAPEUTIC ACTIVITIES: CPT

## 2024-03-25 NOTE — PROGRESS NOTES
[facility]   Physical Therapy Treatment Note     Name: Adrian Healy  Clinic Number: [id number]    Therapy Diagnosis:   Encounter Diagnoses   Name Primary?    Chronic pain of right knee Yes    Chronic pain of left knee        Physician: Dev Frankel III, *    Visit Date: 3/25/2024    Physician Orders: PT Eval and Treat   Medical Diagnosis from Referral: M17.11 (ICD-10-CM) - Patellofemoral arthritis of right knee   Evaluation Date: 3/7/2024  Authorization Period Expiration: 12/31/2024  Plan of Care Expiration: 5/7/2024  Progress Note Due: 5/7/2024  Visit # / Visits authorized: 5 / 20 + eval    FOTO: 1/3     PTA Visit #: 1 / 5     Time In: 1300  Time Out: 1400  Total Treatment Time: 60 minutes  Total Billable Time: 60 minutes     Precautions: Standard    SUBJECTIVE     Patient reports: No complaints following addition of shuttle last visit.    FOTO IE - 56%  FOTO 3/25/24 - 74%  FOTO Goal - 68%    He was compliant with home exercise program.  Response to previous treatment: appropriate muscle response  Functional change: ongoing    Pain: 0/10, currently  Location: Right knee    Patient goals: ADLs w/o complaint / return to gym    OBJECTIVE     Objective Measures updated at progress report unless specified.     Lower Extremity Strength:  Right LE   Left LE     Quadriceps: 4-/5 * pain + lag SLR Quadriceps: 4/5 + lag SLR   Hamstrings: 4+/5 Hamstrings: 4+/5   Hip Flexion: 3+/5 Hip Flexion: 4-/5   Hip Extension:  3-/5 Hip Extension: 3/5   Hip ABD: 3-/5 Hip ABD: 3/5     Flexibility:               90/90 Hamstrings: R = +++ ; L = +++               Mariely's test: R = - ; L = -              Prone knee bend (RF): R = +; L = +    Treatment     Adrian received the treatments listed below:      neuromuscular re-education activities to improve: Balance, Coordination, Kinesthetic, Sense, Proprioception, and Posture for 45 minutes. The following activities were included:     Patient education:  -  diagnosis, prognosis, impairments  - importance of quad NM activation and strength  - cuing for form  - HEP    HS leg off table stretch 3x30 sec ea  Quad set towel; 15 reps with 10 second hold Bilateral  ASLR 3x10x5 sec ea andrea - cue DF if needed   HL Bridge with YTB looped; 3x10x5 sec hold  SL Clams YTB looped; 3x10x5 sec hold ea andrea    NEXT VISIT - bike if no symptoms; lateral walk / shuttle    Held:  Prone HS curl 3x10 ea andrea - cue eccentric    therapeutic activities to improve functional performance for 15 minutes, including:     Shuttle DL Press; 50 lbs, 3 x 10 reps  Shuttle SL Press; 50 lbs, 3 x 10 reps ea andrea    Held:  Nustep for endogenous release of opioids for 10 minutes, Level 5    Patient Education and Home Exercises     Home Exercises Provided and Patient Education Provided     Education provided:   - See above    Written Home Exercises Provided: Patient instructed to cont prior HEP. Exercises were reviewed and Adrian was able to demonstrate them prior to the end of the session.  Adrian demonstrated good  understanding of the education provided. See EMR under Patient Instructions for exercises provided during therapy sessions    ASSESSMENT     Improving function per FOTO scores. [place] all interventions well and was significantly challenged by SL and DL shuttle for CKC LE functional strength within visit. If no symptoms occur post this progression, plan to retrial bike at next visit.    Cont focus on quad > hip NM activation michael functionally.    Adrian Is progressing well towards his goals.   Pt prognosis is Good.     Pt will continue to benefit from skilled outpatient physical therapy to address the deficits listed in the problem list box on initial evaluation, provide pt/family education and to maximize pt's level of independence in the home and community environment.     Pt's spiritual, cultural and educational needs considered and pt agreeable to plan of care and goals.     Anticipated barriers to  physical therapy: chronicity    Goals:   Short Term Goals:  0-4 weeks  1.Report decreased R knee pain  < / =  4/10 at worst to increase tolerance for ADLs  2. Increase knee ROM to full and pain free in order to be able to perform ADLs without difficulty.  3. Increase strength by 1/3 MMT grade in quad  to increase tolerance for ADL and work activities.  4. Pt to tolerate HEP to improve ROM and independence with ADL's     Long Term Goals: 5-8 weeks  1.Report decreased R knee pain < / = 2/10 at worst to increase tolerance for ADLs  2.Patient goal: ADLs w/o complaint / return to gym  3.Increase strength to >/= 4+/5 in quad  to increase tolerance for ADL and work activities.  4. Pt will report at CJ level (20-40% impaired) on FOTO knee to demonstrate increase in LE function with every day tasks.     PLAN     Plan of Care Certification: 3/7/2024 to 5/7/2024.     Outpatient Physical Therapy 2 times weekly for 6-8 weeks to include the following interventions: Gait Training, Manual Therapy, Moist Heat/ Ice, Neuromuscular Re-ed, Patient Education, Self Care, Therapeutic Activities, and Therapeutic Exercise.     PT/PTA met face to face to discuss patient's treatment plan and progress towards established goals. Patient will be seen by physical therapist every sixth visit and minimally once per month.    MARTA ROCHA, PT, DPT, OCS

## 2024-03-28 ENCOUNTER — CLINICAL SUPPORT (OUTPATIENT)
Dept: REHABILITATION | Facility: HOSPITAL | Age: 84
End: 2024-03-28
Payer: MEDICARE

## 2024-03-28 DIAGNOSIS — G89.29 CHRONIC PAIN OF RIGHT KNEE: Primary | ICD-10-CM

## 2024-03-28 DIAGNOSIS — G89.29 CHRONIC PAIN OF LEFT KNEE: ICD-10-CM

## 2024-03-28 DIAGNOSIS — M25.561 CHRONIC PAIN OF RIGHT KNEE: Primary | ICD-10-CM

## 2024-03-28 DIAGNOSIS — M25.562 CHRONIC PAIN OF LEFT KNEE: ICD-10-CM

## 2024-03-28 PROCEDURE — 97530 THERAPEUTIC ACTIVITIES: CPT

## 2024-03-28 PROCEDURE — 97112 NEUROMUSCULAR REEDUCATION: CPT

## 2024-03-28 NOTE — PROGRESS NOTES
[facility]   Physical Therapy Treatment Note     Name: Adrian Healy  Clinic Number: [id number]    Therapy Diagnosis:   Encounter Diagnoses   Name Primary?    Chronic pain of right knee Yes    Chronic pain of left knee      Physician: Dev Frankel III, *    Visit Date: 3/28/2024    Physician Orders: PT Eval and Treat   Medical Diagnosis from Referral: M17.11 (ICD-10-CM) - Patellofemoral arthritis of right knee   Evaluation Date: 3/7/2024  Authorization Period Expiration: 12/31/2024  Plan of Care Expiration: 5/7/2024  Progress Note Due: 5/7/2024  Visit # / Visits authorized: 6 / 20 + eval    FOTO: 1/3     PTA Visit #: 1 / 5     Time In: 1400  Time Out: 1455  Total Treatment Time: 55 minutes  Total Billable Time: 55 minutes     Precautions: Standard    SUBJECTIVE     Patient reports: No complaints; just muscle soreness after last visit.    FOTO IE - 56%  FOTO 3/25/24 - 74%  FOTO Goal - 68%    He was compliant with home exercise program.  Response to previous treatment: appropriate muscle response  Functional change: ongoing    Pain: 0/10, currently  Location: Right knee    Patient goals: ADLs w/o complaint / return to gym    OBJECTIVE     Objective Measures updated at progress report unless specified.     Lower Extremity Strength:  Right LE   Left LE     Quadriceps: 4-/5 * pain + lag SLR Quadriceps: 4/5 + lag SLR   Hamstrings: 4+/5 Hamstrings: 4+/5   Hip Flexion: 3+/5 Hip Flexion: 4-/5   Hip Extension:  3-/5 Hip Extension: 3/5   Hip ABD: 3-/5 Hip ABD: 3/5     Flexibility:               90/90 Hamstrings: R = +++ ; L = +++               Mariely's test: R = - ; L = -              Prone knee bend (RF): R = +; L = +    Treatment     Adrian received the treatments listed below:      neuromuscular re-education activities to improve: Balance, Coordination, Kinesthetic, Sense, Proprioception, and Posture for 45 minutes. The following activities were included:     Patient education:  - diagnosis,  prognosis, impairments  - importance of quad NM activation and strength  - cuing for form  - HEP    Quad set towel; 15 reps with 10 second hold Bilateral  ASLR 15x5 sec ea andrea - cue DF if needed     HL Bridge with GTB looped; 3x10x5 sec hold  SL Clams GTB looped; 3x10x5 sec hold ea andrea      NEXT VISIT - Lateral walk / shuttle / bike    Held:  HS leg off table stretch 3x30 sec ea  Prone HS curl 3x10 ea andrea - cue eccentric    therapeutic activities to improve functional performance for 10 minutes, including:     Bike x10 min Lvl 6.0 - cuing quad push ea revolution      Held:  Shuttle DL Press; 50 lbs, 3 x 10 reps  Shuttle SL Press; 50 lbs, 3 x 10 reps ea andrea    Patient Education and Home Exercises     Home Exercises Provided and Patient Education Provided     Education provided:   - See above    Written Home Exercises Provided: Patient instructed to cont prior HEP. Exercises were reviewed and Adrian was able to demonstrate them prior to the end of the session.  Adrian demonstrated good  understanding of the education provided. See EMR under Patient Instructions for exercises provided during therapy sessions    ASSESSMENT     Good dianne to all interventions and dianne bike within visit. Trial of bike to see if he can reintegrate this at home michael with active quad loading during revolutions.     Progressing well.    Cont focus on quad > hip NM activation michael functionally.    Adrian Is progressing well towards his goals.   Pt prognosis is Good.     Pt will continue to benefit from skilled outpatient physical therapy to address the deficits listed in the problem list box on initial evaluation, provide pt/family education and to maximize pt's level of independence in the home and community environment.     Pt's spiritual, cultural and educational needs considered and pt agreeable to plan of care and goals.     Anticipated barriers to physical therapy: chronicity    Goals:   Short Term Goals:  0-4 weeks  1.Report decreased R  knee pain  < / =  4/10 at worst to increase tolerance for ADLs  2. Increase knee ROM to full and pain free in order to be able to perform ADLs without difficulty.  3. Increase strength by 1/3 MMT grade in quad  to increase tolerance for ADL and work activities.  4. Pt to tolerate HEP to improve ROM and independence with ADL's     Long Term Goals: 5-8 weeks  1.Report decreased R knee pain < / = 2/10 at worst to increase tolerance for ADLs  2.Patient goal: ADLs w/o complaint / return to gym  3.Increase strength to >/= 4+/5 in quad  to increase tolerance for ADL and work activities.  4. Pt will report at CJ level (20-40% impaired) on FOTO knee to demonstrate increase in LE function with every day tasks.     PLAN     Plan of Care Certification: 3/7/2024 to 5/7/2024.     Outpatient Physical Therapy 2 times weekly for 6-8 weeks to include the following interventions: Gait Training, Manual Therapy, Moist Heat/ Ice, Neuromuscular Re-ed, Patient Education, Self Care, Therapeutic Activities, and Therapeutic Exercise.     PT/PTA met face to face to discuss patient's treatment plan and progress towards established goals. Patient will be seen by physical therapist every sixth visit and minimally once per month.    MARTA ROCHA, PT, DPT, OCS

## 2024-04-01 ENCOUNTER — CLINICAL SUPPORT (OUTPATIENT)
Dept: REHABILITATION | Facility: HOSPITAL | Age: 84
End: 2024-04-01
Payer: MEDICARE

## 2024-04-01 DIAGNOSIS — G89.29 CHRONIC PAIN OF LEFT KNEE: ICD-10-CM

## 2024-04-01 DIAGNOSIS — M25.562 CHRONIC PAIN OF LEFT KNEE: ICD-10-CM

## 2024-04-01 DIAGNOSIS — G89.29 CHRONIC PAIN OF RIGHT KNEE: Primary | ICD-10-CM

## 2024-04-01 DIAGNOSIS — M25.561 CHRONIC PAIN OF RIGHT KNEE: Primary | ICD-10-CM

## 2024-04-01 PROCEDURE — 97112 NEUROMUSCULAR REEDUCATION: CPT | Mod: CQ

## 2024-04-01 PROCEDURE — 97530 THERAPEUTIC ACTIVITIES: CPT | Mod: CQ

## 2024-04-01 NOTE — PROGRESS NOTES
[facility]   Physical Therapy Treatment Note     Name: Adrian Healy  Clinic Number: [id number]    Therapy Diagnosis:   Encounter Diagnoses   Name Primary?    Chronic pain of right knee Yes    Chronic pain of left knee      Physician: Dev Frankel III, *    Visit Date: 4/1/2024    Physician Orders: PT Eval and Treat   Medical Diagnosis from Referral: M17.11 (ICD-10-CM) - Patellofemoral arthritis of right knee   Evaluation Date: 3/7/2024  Authorization Period Expiration: 12/31/2024  Plan of Care Expiration: 5/7/2024  Progress Note Due: 5/7/2024  Visit # / Visits authorized: 7 / 20 + eval    FOTO: 2/3     PTA Visit #: 1 / 5     Time In: 1255  Time Out: 1350  Total Treatment Time: 55 minutes  Total Billable Time: 55 minutes (3 NMR, 1 TA)    Precautions: Standard    SUBJECTIVE     Patient reports: he fell on Saturday and tried to catch himself on his Right side. Has some stiffness throughout his Right shoulder and lats. Knees feel ok.    FOTO IE - 56%  FOTO 3/25/24 - 74%  FOTO Goal - 68%    He was compliant with home exercise program.  Response to previous treatment: appropriate muscle response  Functional change: ongoing    Pain: 0/10, currently  Location: Right knee    Patient goals: ADLs w/o complaint / return to gym    OBJECTIVE     Objective Measures updated at progress report unless specified.     Lower Extremity Strength:  Right LE   Left LE     Quadriceps: 4-/5 * pain + lag SLR Quadriceps: 4/5 + lag SLR   Hamstrings: 4+/5 Hamstrings: 4+/5   Hip Flexion: 3+/5 Hip Flexion: 4-/5   Hip Extension:  3-/5 Hip Extension: 3/5   Hip ABD: 3-/5 Hip ABD: 3/5     Flexibility:               90/90 Hamstrings: R = +++ ; L = +++               Mariely's test: R = - ; L = -              Prone knee bend (RF): R = +; L = +    Treatment     Adrian received the treatments listed below:      neuromuscular re-education activities to improve: Balance, Coordination, Kinesthetic, Sense, Proprioception, and  Posture for 45 minutes. The following activities were included:  Patient education:  - diagnosis, prognosis, impairments  - importance of quad NM activation and strength  - cuing for form  - HEP    Quad set towel; 20 reps with 10 second hold Bilateral  ASLR 15x5 sec ea andrea - cue DF if needed     HL Bridge with GTB looped; 3x10x5 sec hold  SL Clams GTB looped; 3x10x5 sec hold ea andrea    NEXT VISIT - Lateral walk / shuttle / bike    Held:  HS leg off table stretch 3x30 sec ea  Prone HS curl 3x10 ea andrea - cue eccentric    therapeutic activities to improve functional performance for 10 minutes, including:   Bike x 10 min Lvl 6.0 - cuing quad push ea revolution    Held:  Shuttle DL Press; 50 lbs, 3 x 10 reps  Shuttle SL Press; 50 lbs, 3 x 10 reps ea andrea    Patient Education and Home Exercises     Home Exercises Provided and Patient Education Provided     Education provided:   - See above    Written Home Exercises Provided: Patient instructed to cont prior HEP. Exercises were reviewed and Adrian was able to demonstrate them prior to the end of the session.  Adrian demonstrated good  understanding of the education provided. See EMR under Patient Instructions for exercises provided during therapy sessions    ASSESSMENT     Overall good tolerance to therapeutic interventions noting adequate muscle response. Limited progression today per patient request due to subjective complaints. Will continue per POC towards treatment goals.  Adrian Is progressing well towards his goals.   Pt prognosis is Good.     Pt will continue to benefit from skilled outpatient physical therapy to address the deficits listed in the problem list box on initial evaluation, provide pt/family education and to maximize pt's level of independence in the home and community environment.     Pt's spiritual, cultural and educational needs considered and pt agreeable to plan of care and goals.     Anticipated barriers to physical therapy: chronicity    Goals:    Short Term Goals:  0-4 weeks  1.Report decreased R knee pain  < / =  4/10 at worst to increase tolerance for ADLs  2. Increase knee ROM to full and pain free in order to be able to perform ADLs without difficulty.  3. Increase strength by 1/3 MMT grade in quad  to increase tolerance for ADL and work activities.  4. Pt to tolerate HEP to improve ROM and independence with ADL's     Long Term Goals: 5-8 weeks  1.Report decreased R knee pain < / = 2/10 at worst to increase tolerance for ADLs  2.Patient goal: ADLs w/o complaint / return to gym  3.Increase strength to >/= 4+/5 in quad  to increase tolerance for ADL and work activities.  4. Pt will report at CJ level (20-40% impaired) on FOTO knee to demonstrate increase in LE function with every day tasks.     PLAN     Plan of Care Certification: 3/7/2024 to 5/7/2024.     Outpatient Physical Therapy 2 times weekly for 6-8 weeks to include the following interventions: Gait Training, Manual Therapy, Moist Heat/ Ice, Neuromuscular Re-ed, Patient Education, Self Care, Therapeutic Activities, and Therapeutic Exercise.     PT/PTA met face to face to discuss patient's treatment plan and progress towards established goals. Patient will be seen by physical therapist every sixth visit and minimally once per month.    Heidi Rojas PTA

## 2024-04-01 NOTE — Clinical Note
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 States massage and stretch helps to decrease her pain. Reports she was ill last week.     Pain / Symptoms  - Pain rating (out of 10): Current: 2       OBJECTIVE                                                                                                                     Range of Motion (ROM)   (degrees unless noted; active unless noted; norms in ( ); negative=lacking to 0, positive=beyond 0)  Shoulder:    - Flexion (180):         Right:   Passive: 160    - Abduction (180):         Right:   Passive: 160    - Internal Rotation:        - at 90° (70-90):             Right:   Passive: 90    - External Rotation:       - at 90° (90):             Right:   Passive: 90         Palpation  Increase soft tissue restriction noted right rhomboid,  right long head of bicep, right bicep.                 Treatment    Un-attended Electrical Stimulation: Interferential Current  - Purpose: pain relief  - Location: right shoulder  - Position: sitting  - Pulse Rate:  Hz  - Intensity: patient comfort  - In conjunction with: cold pack  - Duration: 15 minutes    Results: decreased pain  Reaction: no adverse reaction to treatment      Therapeutic Exercise  Passive range of motion with end range stretch to right shoulder in all planes     - Supine Shoulder Press with Dowel  - 1 set times 10 reps  - Supine Shoulder Flexion Extension AAROM with Dowel  - 1 set times 10 reps  - Supine Shoulder External Rotation with Dowel  - 1 set times 10 reps    - Standing Shoulder Internal Rotation Stretch with Towel  - 10 reps - 10 seconds hold  - Doorway Pec Stretch at  The procedural consent was signed. A history and physical note was completed in the chart. 90 Degrees Abduction  -30 seconds hold times 2 reps    NEW EXERCISES  Arm bike    Seat  5  Level 1   2 minute forward and 2 minute backward       Manual Therapy   Patient received soft tissue mobilization in sitting with decrease tone and tenderness noted afterwards.    Patient received grade III anterior posterior and inferior glide right shoulder in supine        Neuromuscular Re-Education  - Seated Scapular Retraction  - 1 set times 10 reps  NEW EXERCISES  - Sidelying Shoulder Abduction Palm Forward  - 1 set times 10 reps  - Sidelying 1# Shoulder External Rotation  - 2 set times 10 reps    Speed pulleys 2 plates ( 8#)  shoulder rows 1 set times 15 reps  Speed pulleys 2 plates ( 8#)  shoulder extension  1 set times 15 reps  Speed pulleys 2 plates ( 8#)  Bilateral tricep press 1 set times 15 reps  Speed pulleys 2 plates ( 8#)  Bilateral bicep curl 1 set times 15 reps  Speed pulleys 2 plates ( 4#)  shoulder internal rotation single pulley  1 set times 15 reps  Speed pulleys 1 plates ( 2#)  shoulder external rotation single pulley  1 set times 15 reps  Speed pulleys 1 plates ( 4#)  Shoulder horizontal abduction 1 set times 15 reps    Patient needs cueing for proper positioning and muscle engagement to perform correctly.      Skilled input: verbal instruction/cues and tactile instruction/cues    Writer verbally educated and received verbal consent for hand placement, positioning of patient, and techniques to be performed today from patient for clothing adjustments for techniques, hand placement and palpation for techniques and therapist position for techniques as described above and how they are pertinent to the patient's plan of care.  Home Exercise Program  Access Code: [email or web address]  URL: [email or web address]  Date: 04/01/2024  Prepared by: Leatha Uribe    Exercises  - Supine Shoulder Press with Dowel  - 1-2 x daily - 7 x weekly - 1-3 sets - 10 reps  - Supine Shoulder Flexion Extension DEMARCUS  with Dowel  - 1-2 x daily - 7 x weekly - 1-3 sets - 10 reps  - Supine Shoulder External Rotation with Dowel  - 1-2 x daily - 7 x weekly - 1-3 sets - 10 reps  - Seated Scapular Retraction  - 1-2 x daily - 7 x weekly - 1-3 sets - 10 reps  - Standing Shoulder Internal Rotation Stretch with Towel  - 1-2 x daily - 7 x weekly - 1 sets - 10 reps - 10 seconds hold  - Doorway Pec Stretch at 90 Degrees Abduction  - 1-2 x daily - 7 x weekly - 2-3 reps - 15-30 seconds hold  - Sidelying Shoulder Abduction Palm Forward  - 1-2 x daily - 7 x weekly - 1-3 sets - 10 reps  - Sidelying Shoulder External Rotation  - 1-2 x daily - 7 x weekly - 1-3 sets - 10 reps  - Standing Shoulder Row with Anchored Resistance  - 1-2 x daily - 7 x weekly - 1-3 sets - 10 reps  - Shoulder Extension with Resistance  - 1-2 x daily - 7 x weekly - 1-3 sets - 10 reps  - Standing Shoulder Horizontal Abduction with Anchored Resistance  - 1-2 x daily - 7 x weekly - 1-3 sets - 10 reps  - Shoulder Internal Rotation with Resistance  - 1-2 x daily - 7 x weekly - 1-3 sets - 10 reps  - Shoulder External Rotation with Anchored Resistance  - 1-2 x daily - 7 x weekly - 1-3 sets - 10 reps  - Standing Tricep Extensions with Resistance  - 1-2 x daily - 7 x weekly - 1-3 sets - 10 reps  - Standing Single Arm Elbow Flexion with Resistance  - 1-2 x daily - 7 x weekly - 1-3 sets - 10 reps      ASSESSMENT                                                                                                            Increase range of motion noted. Patient able to progress exercises with no increase in pain. Patient responds well to soft tissue mobilization with decrease restrictions noted afterwards.   Plan to continue soft tissue and joint mobilization, therapeutic exercises, therapeutic activities and neuromuscular reeducation working towards patient goal of driving, reaching and lifting with greater ease.      Education:   - Results of above outlined education: Verbalizes  understanding and Needs reinforcement    PLAN                                                                                                                           Suggestions for next session as indicated: Progress per plan of care       Therapy procedure time and total treatment time can be found documented on the Time Entry flowsheet

## 2024-04-04 ENCOUNTER — CLINICAL SUPPORT (OUTPATIENT)
Dept: REHABILITATION | Facility: HOSPITAL | Age: 84
End: 2024-04-04
Payer: MEDICARE

## 2024-04-04 DIAGNOSIS — G89.29 CHRONIC PAIN OF LEFT KNEE: ICD-10-CM

## 2024-04-04 DIAGNOSIS — M25.561 CHRONIC PAIN OF RIGHT KNEE: Primary | ICD-10-CM

## 2024-04-04 DIAGNOSIS — M25.562 CHRONIC PAIN OF LEFT KNEE: ICD-10-CM

## 2024-04-04 DIAGNOSIS — G89.29 CHRONIC PAIN OF RIGHT KNEE: Primary | ICD-10-CM

## 2024-04-04 PROCEDURE — 97112 NEUROMUSCULAR REEDUCATION: CPT

## 2024-04-04 PROCEDURE — 97530 THERAPEUTIC ACTIVITIES: CPT

## 2024-04-04 NOTE — PROGRESS NOTES
[facility]   Physical Therapy Treatment Note     Name: Adrian Healy  Clinic Number: [id number]    Therapy Diagnosis:   Encounter Diagnoses   Name Primary?    Chronic pain of right knee Yes    Chronic pain of left knee        Physician: Dev Frankel III, *    Visit Date: 4/4/2024    Physician Orders: PT Eval and Treat   Medical Diagnosis from Referral: M17.11 (ICD-10-CM) - Patellofemoral arthritis of right knee   Evaluation Date: 3/7/2024  Authorization Period Expiration: 12/31/2024  Plan of Care Expiration: 5/7/2024  Progress Note Due: 5/7/2024  Visit # / Visits authorized: 8 / 20 + eval    FOTO: 2/3     PTA Visit #: 1 / 5     Time In: 1305  Time Out: 1400  Total Treatment Time: 55 minutes  Total Billable Time: 55 minutes     Precautions: Standard    SUBJECTIVE     Patient reports: soreness from fall subsiding; has cleared area of fall so that this can't happen again. No other complaints.      NEXT VISIT - FOTO      FOTO IE - 56%  FOTO 3/25/24 - 74%  FOTO Goal - 68%    He was compliant with home exercise program.  Response to previous treatment: appropriate muscle response  Functional change: ongoing    Pain: 0/10, currently  Location: Right knee    Patient goals: ADLs w/o complaint / return to gym    OBJECTIVE     Objective Measures updated at progress report unless specified.     Lower Extremity Strength:  Right LE   Left LE     Quadriceps: 4-/5 * pain + lag SLR Quadriceps: 4/5 + lag SLR   Hamstrings: 4+/5 Hamstrings: 4+/5   Hip Flexion: 3+/5 Hip Flexion: 4-/5   Hip Extension:  3-/5 Hip Extension: 3/5   Hip ABD: 3-/5 Hip ABD: 3/5     Flexibility:               90/90 Hamstrings: R = +++ ; L = +++               Mariely's test: R = - ; L = -              Prone knee bend (RF): R = +; L = +    Treatment     Adrian received the treatments listed below:      neuromuscular re-education activities to improve: Balance, Coordination, Kinesthetic, Sense, Proprioception, and Posture for 25  minutes. The following activities were included:    Patient education:  - diagnosis, prognosis, impairments  - importance of quad NM activation and strength  - cuing for form  - HEP      Quad set towel; 15 reps with 10 second hold Bilateral  ASLR 15x5 sec ea andrea - cue DF if needed     SLB 3x20 sec ea andrea with UE support  Tandem 3x20 sec ea andrea with UE support      REVIEW:  HL Bridge with GTB looped; 3x10x5 sec hold  SL Clams GTB looped; 3x10x5 sec hold ea andrea    NEXT VISIT - functional strength / stairs; balance    Held:  HS leg off table stretch 3x30 sec ea  Prone HS curl 3x10 ea andrea - cue eccentric    therapeutic activities to improve functional performance for 30 minutes, including:     Bike x 10 min Lvl 6.0 - cuing quad push ea revolution    Lateral walk GTB x5 laps (at ankle)  Lateral walk GTB x5 laps (at knee)    Sit to stand GTB 3x10     Held:  Shuttle DL Press; 50 lbs, 3 x 10 reps  Shuttle SL Press; 50 lbs, 3 x 10 reps ea andrea    Patient Education and Home Exercises     Home Exercises Provided and Patient Education Provided     Education provided:   - See above    Written Home Exercises Provided: Patient instructed to cont prior HEP. Exercises were reviewed and Adrian was able to demonstrate them prior to the end of the session.  Adrian demonstrated good  understanding of the education provided. See EMR under Patient Instructions for exercises provided during therapy sessions    ASSESSMENT     Dianne all interventions well. Good dianne to progression to more functional strengthening. Would benefit from cont progression with this along with some balance training prior to DC secondary to fall.    Adrian Is progressing well towards his goals.   Pt prognosis is Good.     Pt will continue to benefit from skilled outpatient physical therapy to address the deficits listed in the problem list box on initial evaluation, provide pt/family education and to maximize pt's level of independence in the home and community  environment.     Pt's spiritual, cultural and educational needs considered and pt agreeable to plan of care and goals.     Anticipated barriers to physical therapy: chronicity    Goals:   Short Term Goals:  0-4 weeks  1.Report decreased R knee pain  < / =  4/10 at worst to increase tolerance for ADLs  2. Increase knee ROM to full and pain free in order to be able to perform ADLs without difficulty.  3. Increase strength by 1/3 MMT grade in quad  to increase tolerance for ADL and work activities.  4. Pt to tolerate HEP to improve ROM and independence with ADL's     Long Term Goals: 5-8 weeks  1.Report decreased R knee pain < / = 2/10 at worst to increase tolerance for ADLs  2.Patient goal: ADLs w/o complaint / return to gym  3.Increase strength to >/= 4+/5 in quad  to increase tolerance for ADL and work activities.  4. Pt will report at CJ level (20-40% impaired) on FOTO knee to demonstrate increase in LE function with every day tasks.     PLAN     Plan of Care Certification: 3/7/2024 to 5/7/2024.     Outpatient Physical Therapy 2 times weekly for 6-8 weeks to include the following interventions: Gait Training, Manual Therapy, Moist Heat/ Ice, Neuromuscular Re-ed, Patient Education, Self Care, Therapeutic Activities, and Therapeutic Exercise.     PT/PTA met face to face to discuss patient's treatment plan and progress towards established goals. Patient will be seen by physical therapist every sixth visit and minimally once per month.    MARTA ROCHA, PT, DPT, OCS

## 2024-04-11 ENCOUNTER — CLINICAL SUPPORT (OUTPATIENT)
Dept: REHABILITATION | Facility: HOSPITAL | Age: 84
End: 2024-04-11
Payer: MEDICARE

## 2024-04-11 DIAGNOSIS — G89.29 CHRONIC PAIN OF LEFT KNEE: ICD-10-CM

## 2024-04-11 DIAGNOSIS — G89.29 CHRONIC PAIN OF RIGHT KNEE: Primary | ICD-10-CM

## 2024-04-11 DIAGNOSIS — M25.561 CHRONIC PAIN OF RIGHT KNEE: Primary | ICD-10-CM

## 2024-04-11 DIAGNOSIS — M25.562 CHRONIC PAIN OF LEFT KNEE: ICD-10-CM

## 2024-04-11 PROCEDURE — 97112 NEUROMUSCULAR REEDUCATION: CPT

## 2024-04-11 PROCEDURE — 97530 THERAPEUTIC ACTIVITIES: CPT

## 2024-04-11 NOTE — PROGRESS NOTES
[facility]   Physical Therapy Treatment Note     Name: Adrian Healy  Clinic Number: [id number]    Therapy Diagnosis:   Encounter Diagnoses   Name Primary?    Chronic pain of right knee Yes    Chronic pain of left knee      Physician: Dev Frankel III, *    Visit Date: 4/11/2024    Physician Orders: PT Eval and Treat   Medical Diagnosis from Referral: M17.11 (ICD-10-CM) - Patellofemoral arthritis of right knee   Evaluation Date: 3/7/2024  Authorization Period Expiration: 12/31/2024  Plan of Care Expiration: 5/7/2024  Progress Note Due: 5/7/2024  Visit # / Visits authorized: 9 / 20 + eval    FOTO: 2/3     PTA Visit #: 1 / 5     Time In: 1300  Time Out: 1400  Total Treatment Time: 60 minutes  Total Billable Time: 30 minutes     Precautions: Standard    SUBJECTIVE     Patient reports: sore after last visit in knees with some swelling. Feels like this was chair sit to stands. Rested and took Monday off and feels better today.       NEXT VISIT - FOTO       FOTO IE - 56%  FOTO 3/25/24 - 74%  FOTO Goal - 68%    He was compliant with home exercise program.  Response to previous treatment: appropriate muscle response  Functional change: ongoing    Pain: 0/10, currently  Location: Right knee    Patient goals: ADLs w/o complaint / return to gym    OBJECTIVE     Objective Measures updated at progress report unless specified.     Lower Extremity Strength:  Right LE   Left LE     Quadriceps: 4-/5 * pain + lag SLR Quadriceps: 4/5 + lag SLR   Hamstrings: 4+/5 Hamstrings: 4+/5   Hip Flexion: 3+/5 Hip Flexion: 4-/5   Hip Extension:  3-/5 Hip Extension: 3/5   Hip ABD: 3-/5 Hip ABD: 3/5     Flexibility:               90/90 Hamstrings: R = +++ ; L = +++               Mariely's test: R = - ; L = -              Prone knee bend (RF): R = +; L = +    Treatment     Adrian received the treatments listed below:      neuromuscular re-education activities to improve: Balance, Coordination, Kinesthetic, Sense,  Proprioception, and Posture for 45 minutes. The following activities were included:    Patient education:  - diagnosis, prognosis, impairments  - importance of quad NM activation and strength  - cuing for form  - HEP    Quad set towel; 30 reps with 10 second hold Bilateral  ASLR 2x10x5 sec ea andrea - cue DF if needed   Tandem 4x30 sec ea andrea with UE support  SLB 4x30 sec ea andrea with UE support    REVIEW:  HL Bridge with GTB looped; 3x10x5 sec hold  SL Clams GTB looped; 3x10x5 sec hold ea andrea    NEXT VISIT - functional strength / stairs per dianne; balance    Held:  HS leg off table stretch 3x30 sec ea  Prone HS curl 3x10 ea andrea - cue eccentric    therapeutic activities to improve functional performance for 15 minutes, including:     Bike x 10 min Lvl 6.0 - cuing quad push ea revolution  Lateral walk GTB x5 laps (at knee)    Held:  Shuttle DL Press; 50 lbs, 3 x 10 reps  Shuttle SL Press; 50 lbs, 3 x 10 reps ea andrea  Lateral walk GTB x5 laps (at ankle)  Sit to stand GTB 3x10     Patient Education and Home Exercises     Home Exercises Provided and Patient Education Provided     Education provided:   - See above    Written Home Exercises Provided: Patient instructed to cont prior HEP. Exercises were reviewed and Adrian was able to demonstrate them prior to the end of the session.  Adrian demonstrated good  understanding of the education provided. See EMR under Patient Instructions for exercises provided during therapy sessions    ASSESSMENT     Dianne all interventions well within visit. Held on sit to stands secondary to negative reaction post last visit.     Would benefit from cont progression per dianne with some balance training prior to DC.    Adrian Is progressing well towards his goals.   Pt prognosis is Good.     Pt will continue to benefit from skilled outpatient physical therapy to address the deficits listed in the problem list box on initial evaluation, provide pt/family education and to maximize pt's level of  independence in the home and community environment.     Pt's spiritual, cultural and educational needs considered and pt agreeable to plan of care and goals.     Anticipated barriers to physical therapy: chronicity    Goals:   Short Term Goals:  0-4 weeks  1.Report decreased R knee pain  < / =  4/10 at worst to increase tolerance for ADLs  2. Increase knee ROM to full and pain free in order to be able to perform ADLs without difficulty.  3. Increase strength by 1/3 MMT grade in quad  to increase tolerance for ADL and work activities.  4. Pt to tolerate HEP to improve ROM and independence with ADL's     Long Term Goals: 5-8 weeks  1.Report decreased R knee pain < / = 2/10 at worst to increase tolerance for ADLs  2.Patient goal: ADLs w/o complaint / return to gym  3.Increase strength to >/= 4+/5 in quad  to increase tolerance for ADL and work activities.  4. Pt will report at CJ level (20-40% impaired) on FOTO knee to demonstrate increase in LE function with every day tasks.     PLAN     Plan of Care Certification: 3/7/2024 to 5/7/2024.     Outpatient Physical Therapy 2 times weekly for 6-8 weeks to include the following interventions: Gait Training, Manual Therapy, Moist Heat/ Ice, Neuromuscular Re-ed, Patient Education, Self Care, Therapeutic Activities, and Therapeutic Exercise.     PT/PTA met face to face to discuss patient's treatment plan and progress towards established goals. Patient will be seen by physical therapist every sixth visit and minimally once per month.    MARTA ROCHA, PT, DPT, OCS

## 2024-04-15 ENCOUNTER — CLINICAL SUPPORT (OUTPATIENT)
Dept: REHABILITATION | Facility: HOSPITAL | Age: 84
End: 2024-04-15
Payer: MEDICARE

## 2024-04-15 DIAGNOSIS — G89.29 CHRONIC PAIN OF RIGHT KNEE: Primary | ICD-10-CM

## 2024-04-15 DIAGNOSIS — M25.562 CHRONIC PAIN OF LEFT KNEE: ICD-10-CM

## 2024-04-15 DIAGNOSIS — M25.561 CHRONIC PAIN OF RIGHT KNEE: Primary | ICD-10-CM

## 2024-04-15 DIAGNOSIS — G89.29 CHRONIC PAIN OF LEFT KNEE: ICD-10-CM

## 2024-04-15 PROCEDURE — 97112 NEUROMUSCULAR REEDUCATION: CPT

## 2024-04-15 PROCEDURE — 97530 THERAPEUTIC ACTIVITIES: CPT

## 2024-04-15 NOTE — PROGRESS NOTES
[facility]   Physical Therapy Treatment Note     Name: Adrian Healy  Clinic Number: [id number]    Therapy Diagnosis:   Encounter Diagnoses   Name Primary?    Chronic pain of right knee Yes    Chronic pain of left knee      Physician: Dev Frankel III, *    Visit Date: 4/15/2024    Physician Orders: PT Eval and Treat   Medical Diagnosis from Referral: M17.11 (ICD-10-CM) - Patellofemoral arthritis of right knee   Evaluation Date: 3/7/2024  Authorization Period Expiration: 12/31/2024  Plan of Care Expiration: 5/7/2024  Progress Note Due: 5/7/2024  Visit # / Visits authorized: 10 / 20 + eval    FOTO: 2/3     PTA Visit #: 1 / 5     Time In: 1255  Time Out: 1355  Total Treatment Time: 60 minutes  Total Billable Time: 40 minutes     Precautions: Standard    SUBJECTIVE     Patient reports: No complaints. No soreness post last visit.    NEXT VISIT - FOTO    FOTO IE - 56%  FOTO 3/25/24 - 74%  FOTO Goal - 68%    He was compliant with home exercise program.  Response to previous treatment: appropriate muscle response  Functional change: ongoing    Pain: 0/10, currently  Location: Right knee    Patient goals: ADLs w/o complaint / return to gym    OBJECTIVE     Objective Measures updated at progress report unless specified.     Lower Extremity Strength:  Right LE   Left LE     Quadriceps: 4-/5 * pain + lag SLR Quadriceps: 4/5 + lag SLR   Hamstrings: 4+/5 Hamstrings: 4+/5   Hip Flexion: 3+/5 Hip Flexion: 4-/5   Hip Extension:  3-/5 Hip Extension: 3/5   Hip ABD: 3-/5 Hip ABD: 3/5     Flexibility:               90/90 Hamstrings: R = +++ ; L = +++               Mariely's test: R = - ; L = -              Prone knee bend (RF): R = +; L = +    Treatment     Adrian received the treatments listed below:      neuromuscular re-education activities to improve: Balance, Coordination, Kinesthetic, Sense, Proprioception, and Posture for 50 minutes. The following activities were included:    Patient education:  -  diagnosis, prognosis, impairments  - importance of quad NM activation and strength  - cuing for form  - HEP    SL Clams GTB looped; 3x10x5 sec hold ea andrea  HL Bridge with GTB looped; 3x10x5 sec hold  Quad set towel; 30 reps with 10 second hold Bilateral    Tandem 3x30 sec ea andrea with UE support  SL linda taps (small) 3x20 ea andrea with UE support    REVIEW:  ASLR 2x10x5 sec ea andrea - cue DF if needed   SLB 4x30 sec ea andrea with UE support    NEXT VISIT - POC    Held:  HS leg off table stretch 3x30 sec ea  Prone HS curl 3x10 ea andrea - cue eccentric    therapeutic activities to improve functional performance for 10 minutes, including:     Bike x 10 min Lvl 6.0 - cuing quad push ea revolution      Held:  Lateral walk GTB x5 laps (at knee)  Shuttle DL Press; 50 lbs, 3 x 10 reps  Shuttle SL Press; 50 lbs, 3 x 10 reps ea andrea  Lateral walk GTB x5 laps (at ankle)  Sit to stand GTB 3x10     Patient Education and Home Exercises     Home Exercises Provided and Patient Education Provided     Education provided:   - See above    Written Home Exercises Provided: Patient instructed to cont prior HEP. Exercises were reviewed and Adrian was able to demonstrate them prior to the end of the session.  Adrian demonstrated good  understanding of the education provided. See EMR under Patient Instructions for exercises provided during therapy sessions    ASSESSMENT     Again dianne all interventions well within visit. Patient nearing ready for DC with HEP pending cont progress.    Adrian Is progressing well towards his goals.   Pt prognosis is Good.     Pt will continue to benefit from skilled outpatient physical therapy to address the deficits listed in the problem list box on initial evaluation, provide pt/family education and to maximize pt's level of independence in the home and community environment.     Pt's spiritual, cultural and educational needs considered and pt agreeable to plan of care and goals.     Anticipated barriers to  physical therapy: chronicity    Goals:   Short Term Goals:  0-4 weeks  1.Report decreased R knee pain  < / =  4/10 at worst to increase tolerance for ADLs  2. Increase knee ROM to full and pain free in order to be able to perform ADLs without difficulty.  3. Increase strength by 1/3 MMT grade in quad  to increase tolerance for ADL and work activities.  4. Pt to tolerate HEP to improve ROM and independence with ADL's     Long Term Goals: 5-8 weeks  1.Report decreased R knee pain < / = 2/10 at worst to increase tolerance for ADLs  2.Patient goal: ADLs w/o complaint / return to gym  3.Increase strength to >/= 4+/5 in quad  to increase tolerance for ADL and work activities.  4. Pt will report at CJ level (20-40% impaired) on FOTO knee to demonstrate increase in LE function with every day tasks.     PLAN     Plan of Care Certification: 3/7/2024 to 5/7/2024.     Outpatient Physical Therapy 2 times weekly for 6-8 weeks to include the following interventions: Gait Training, Manual Therapy, Moist Heat/ Ice, Neuromuscular Re-ed, Patient Education, Self Care, Therapeutic Activities, and Therapeutic Exercise.     PT/PTA met face to face to discuss patient's treatment plan and progress towards established goals. Patient will be seen by physical therapist every sixth visit and minimally once per month.    MARTA ROCHA, PT, DPT, OCS

## 2024-04-19 ENCOUNTER — CLINICAL SUPPORT (OUTPATIENT)
Dept: REHABILITATION | Facility: HOSPITAL | Age: 84
End: 2024-04-19
Payer: MEDICARE

## 2024-04-19 DIAGNOSIS — G89.29 CHRONIC PAIN OF RIGHT KNEE: Primary | ICD-10-CM

## 2024-04-19 DIAGNOSIS — M25.561 CHRONIC PAIN OF RIGHT KNEE: Primary | ICD-10-CM

## 2024-04-19 DIAGNOSIS — M25.562 CHRONIC PAIN OF LEFT KNEE: ICD-10-CM

## 2024-04-19 DIAGNOSIS — G89.29 CHRONIC PAIN OF LEFT KNEE: ICD-10-CM

## 2024-04-19 PROCEDURE — 97112 NEUROMUSCULAR REEDUCATION: CPT

## 2024-04-19 PROCEDURE — 97530 THERAPEUTIC ACTIVITIES: CPT

## 2024-04-19 NOTE — PROGRESS NOTES
[facility]   Physical Therapy Treatment Note     Name: Adrian Healy  Clinic Number: [id number]    Therapy Diagnosis:   Encounter Diagnoses   Name Primary?    Chronic pain of right knee Yes    Chronic pain of left knee        Physician: Dev Frankel III, *    Visit Date: 4/19/2024    Physician Orders: PT Eval and Treat   Medical Diagnosis from Referral: M17.11 (ICD-10-CM) - Patellofemoral arthritis of right knee   Evaluation Date: 3/7/2024  Authorization Period Expiration: 12/31/2024  Plan of Care Expiration: 5/7/2024  Progress Note Due: 5/7/2024  Visit # / Visits authorized: 11 / 20 + eval    FOTO: 2/3     PTA Visit #: 1 / 5     Time In: 1230  Time Out: 1255  Total Treatment Time: 25 minutes  Total Billable Time: 25 minutes     Precautions: Standard    SUBJECTIVE     Patient reports: No complaints. Good HEP compliance reported. Ready for DC.    FOTO IE - 56%  FOTO 3/25/24 - 74%  FOTO 4/19/24 - 98%  FOTO Goal - 68%    He was compliant with home exercise program.  Response to previous treatment: appropriate muscle response  Functional change: ongoing    Pain: 0/10, currently  Location: Right knee    Patient goals: ADLs w/o complaint / return to gym    OBJECTIVE     Objective Measures updated at progress report unless specified.     Lower Extremity Strength IE - 4/19:  Right LE   Left LE     Quadriceps: 4-/5 * pain + lag SLR -> 4+/5 w/ cont lag on SLR Quadriceps: 4/5 + lag SLR -> 4+/5 w/ cont lag on SLR   Hamstrings: 4+/5 Hamstrings: 4+/5   Hip Flexion: 3+/5 Hip Flexion: 4-/5   Hip Extension:  3-/5 -> 4-/5 Hip Extension: 3/5  -> 4-/5   Hip ABD: 3-/5  -> 4-/5 Hip ABD: 3/5  -> 3+/5     Flexibility:               90/90 Hamstrings: R = +++ ; L = +++               Mariely's test: R = - ; L = -              Prone knee bend (RF): R = +; L = +    Treatment     Adrian received the treatments listed below:      neuromuscular re-education activities to improve: Balance, Coordination, Kinesthetic,  Sense, Proprioception, and Posture for 17 minutes. The following activities were included:    Patient education:  - diagnosis, prognosis, impairments  - importance of quad NM activation and strength  - cuing for form  - HEP    Assessments    REVIEW:  Quad set towel; 30 reps with 10 second hold Bilateral  ASLR 2x10x5 sec ea andrea - cue DF if needed   SLB 4x30 sec ea andrea with UE support  SL Clams GTB looped; 3x10x5 sec hold ea andrea  HL Bridge with GTB looped; 3x10x5 sec hold  Tandem 3x30 sec ea andrea with UE support  SL linda taps (small) 3x20 ea andrea with UE support      therapeutic activities to improve functional performance for 8 minutes, including:     Bike x8 min Lvl 6.0 - cuing quad push ea revolution      Held:  Lateral walk GTB x5 laps (at knee)  Shuttle DL Press; 50 lbs, 3 x 10 reps  Shuttle SL Press; 50 lbs, 3 x 10 reps ea andrea  Lateral walk GTB x5 laps (at ankle)  Sit to stand GTB 3x10     Patient Education and Home Exercises     Home Exercises Provided and Patient Education Provided     Education provided:   - See above    Written Home Exercises Provided: Patient instructed to cont prior HEP. Exercises were reviewed and Adrian was able to demonstrate them prior to the end of the session.  Adrian demonstrated good  understanding of the education provided. See EMR under Patient Instructions for exercises provided during therapy sessions    ASSESSMENT     Patient demonstrates improved function per FOTO scores since start of care, improved quad NM activation and strength, along with good HEP consistency.     Secondary to progress patient is DC with HEP.    Adrian Is progressing well towards his goals.   Pt prognosis is Good.     Pt will continue to benefit from skilled outpatient physical therapy to address the deficits listed in the problem list box on initial evaluation, provide pt/family education and to maximize pt's level of independence in the home and community environment.     Pt's spiritual, cultural  and educational needs considered and pt agreeable to plan of care and goals.     Anticipated barriers to physical therapy: chronicity    Goals:   Short Term Goals:  0-4 weeks  1.Report decreased R knee pain  < / =  4/10 at worst to increase tolerance for ADLs - MET  2. Increase knee ROM to full and pain free in order to be able to perform ADLs without difficulty.  - MET  3. Increase strength by 1/3 MMT grade in quad  to increase tolerance for ADL and work activities.  - MET  4. Pt to tolerate HEP to improve ROM and independence with ADL's  - MET     Long Term Goals: 5-8 weeks  1.Report decreased R knee pain < / = 2/10 at worst to increase tolerance for ADLs  - MET  2.Patient goal: ADLs w/o complaint / return to gym  - MET  3.Increase strength to >/= 4+/5 in quad  to increase tolerance for ADL and work activities.  - MET  4. Pt will report at CJ level (20-40% impaired) on FOTO knee to demonstrate increase in LE function with every day tasks.  - MET    PLAN     Plan of Care Certification: 3/7/2024 to 5/7/2024.     DC with HEP    MARTA ROCHA, PT, DPT, OCS

## 2024-05-07 DIAGNOSIS — I48.91 UNSPECIFIED ATRIAL FIBRILLATION: ICD-10-CM

## 2024-05-07 RX ORDER — APIXABAN 5 MG/1
5 TABLET, FILM COATED ORAL 2 TIMES DAILY
Qty: 60 TABLET | Refills: 9 | Status: SHIPPED | OUTPATIENT
Start: 2024-05-07

## 2024-05-17 NOTE — TELEPHONE ENCOUNTER
No care due was identified.  [facility] Embedded Care Due Messages. Reference number: [id number].   5/17/2024 12:14:36 AM CDT

## 2024-05-17 NOTE — TELEPHONE ENCOUNTER
Refill Routing Note   Medication(s) are not appropriate for processing by [facility] for the following reason(s):        Drug-drug interaction    [facility] action(s):  Defer      Medication Therapy Plan: Drug-Drug: citalopram and flecainide      Appointments  past 12m or future 3m with PCP    Date Provider   Last Visit   3/11/2024 Xander Will MD   Next Visit   Visit date not found Xander Will MD   ED visits in past 90 days: 0        Note composed:5:21 PM 05/17/2024

## 2024-05-18 RX ORDER — CITALOPRAM 20 MG/1
20 TABLET, FILM COATED ORAL
Qty: 90 TABLET | Refills: 3 | Status: SHIPPED | OUTPATIENT
Start: 2024-05-18

## 2024-05-18 NOTE — TELEPHONE ENCOUNTER
Refill Decision Note   Adrian Monet  is requesting a refill authorization.  Brief Assessment and Rationale for Refill:  Approve     Medication Therapy Plan:         Comments:     Note composed:8:56 AM 05/18/2024

## 2024-06-10 ENCOUNTER — PATIENT MESSAGE (OUTPATIENT)
Dept: INTERNAL MEDICINE | Facility: CLINIC | Age: 84
End: 2024-06-10
Payer: MEDICARE

## 2024-06-10 RX ORDER — FERROUS SULFATE 325(65) MG
TABLET ORAL
Qty: 90 TABLET | Refills: 1 | Status: SHIPPED | OUTPATIENT
Start: 2024-06-10

## 2024-06-10 NOTE — TELEPHONE ENCOUNTER
Refill Routing Note   Medication(s) are not appropriate for processing by [facility] for the following reason(s):        Outside of protocol    [facility] action(s):  Route             Appointments  past 12m or future 3m with PCP    Date Provider   Last Visit   3/11/2024 Xander Will MD   Next Visit   Visit date not found Xander Will MD   ED visits in past 90 days: 0        Note composed:10:34 AM 06/10/2024

## 2024-08-26 DIAGNOSIS — I48.0 PAROXYSMAL ATRIAL FIBRILLATION: ICD-10-CM

## 2024-08-27 RX ORDER — FLECAINIDE ACETATE 100 MG/1
100 TABLET ORAL EVERY 12 HOURS
Qty: 180 TABLET | Refills: 3 | Status: SHIPPED | OUTPATIENT
Start: 2024-08-27

## 2024-10-16 ENCOUNTER — TELEPHONE (OUTPATIENT)
Dept: GASTROENTEROLOGY | Facility: CLINIC | Age: 84
End: 2024-10-16
Payer: MEDICARE

## 2024-10-16 NOTE — TELEPHONE ENCOUNTER
----- Message from Payton sent at 10/16/2024 10:58 AM CDT -----  Regarding: Schedule Endoscopy  Contact: Pt @[phone]  Pt is calling to speak to someone in the office to schedule procedure. Pt is asking for a return call soon.         Type of Procedure: Endoscopy

## 2024-10-17 ENCOUNTER — E-CONSULT (OUTPATIENT)
Dept: CARDIOLOGY | Facility: CLINIC | Age: 84
End: 2024-10-17
Payer: MEDICARE

## 2024-10-17 ENCOUNTER — PATIENT MESSAGE (OUTPATIENT)
Dept: ENDOSCOPY | Facility: HOSPITAL | Age: 84
End: 2024-10-17
Payer: MEDICARE

## 2024-10-17 ENCOUNTER — TELEPHONE (OUTPATIENT)
Dept: ENDOSCOPY | Facility: HOSPITAL | Age: 84
End: 2024-10-17
Payer: MEDICARE

## 2024-10-17 DIAGNOSIS — I48.0 PAROXYSMAL ATRIAL FIBRILLATION: Primary | ICD-10-CM

## 2024-10-17 DIAGNOSIS — Z01.810 PREOP CARDIOVASCULAR EXAM: Primary | ICD-10-CM

## 2024-10-17 DIAGNOSIS — K31.7 GASTRIC POLYP: Primary | ICD-10-CM

## 2024-10-17 NOTE — CONSULTS
[facility]  Response for E-Consult     Patient Name: Adrian Healy  MRN: [id number]  Primary Care Provider: Xander Will MD   Requesting Provider: Ajay Swain MD  E-Consult to General Cardiology  Consult performed by: Olman Minor MD  Consult ordered by: Ajay Swain MD  Reason for consult: Preop          Recommendation:  Okay to hold Eliquis for 2 days prior to the endoscopy.    Could you undergo endoscopy from cardiac standpoint.  Low risk procedure  Chart reviewed  Contingency that warrants a repeat eConsult or referral:  Not applicable    Total time of Consultation: 5 minute    I did not speak to the requesting provider verbally about this.     *This eConsult is based on the clinical data available to me and is furnished without benefit of a physical examination. The eConsult will need to be interpreted in light of any clinical issues or changes in patient status not available to me at the time of filing this eConsults. Significant changes in patient condition or level of acuity should result in immediate formal consultation and reevaluation. Please alert me if you have further questions.    Thank you for this eConsult referral.     Olman Minor MD  [facility]

## 2024-10-17 NOTE — TELEPHONE ENCOUNTER
Dear Provider,    Patient has a scheduled procedure Upper Endoscopy (EGD) on 12/3/24 and is currently taking a blood thinner. In order to ensure patient safety, we would like to confirm that the patient can place their blood thinner medication on hold for the procedure. Can he/she discontinue Eliquis (apixaban) for a minimum of 2 days prior to the procedure?     Thank you for your prompt reply.    [facility] Endoscopy Scheduling

## 2024-10-17 NOTE — TELEPHONE ENCOUNTER
Spoke to pt to schedule procedure(s) Upper Endoscopy (EGD)       Physician to perform procedure(s) Dr. LETICIA Swain  Date of Procedure (s) 12/3/24  Arrival Time 11:15 AM  Time of Procedure(s) 12:15 PM   Location of Procedure(s) [address]  Type of Rx Prep sent to patient: N/A  Instructions provided to patient via [facility]    Patient was informed on the following information and verbalized understanding. Screening questionnaire reviewed with patient and complete. If procedure requires anesthesia, a responsible adult needs to be present to accompany the patient home, patient cannot drive after receiving anesthesia. Appointment details are tentative, especially check-in time. Patient will receive a prep-op call 7 days prior to confirm check-in time for procedure. If applicable the patient should contact their pharmacy to verify Rx for procedure prep is ready for pick-up. Patient was advised to call the scheduling department at [phone] if pharmacy states no Rx is available. Patient was advised to call the endoscopy scheduling department if any questions or concerns arise.      [facility]oscopy Scheduling Department

## 2024-12-02 ENCOUNTER — TELEPHONE (OUTPATIENT)
Dept: ENDOSCOPY | Facility: HOSPITAL | Age: 84
End: 2024-12-02
Payer: MEDICARE

## 2024-12-02 NOTE — TELEPHONE ENCOUNTER
Pt called to see if he can eat anything. I informed pt He is allowed to have a light evening meal nothing solid after 7pm. Pt verbalize understanding.

## 2024-12-03 ENCOUNTER — ANESTHESIA (OUTPATIENT)
Dept: ENDOSCOPY | Facility: HOSPITAL | Age: 84
End: 2024-12-03
Payer: MEDICARE

## 2024-12-03 ENCOUNTER — ANESTHESIA EVENT (OUTPATIENT)
Dept: ENDOSCOPY | Facility: HOSPITAL | Age: 84
End: 2024-12-03
Payer: MEDICARE

## 2024-12-03 ENCOUNTER — HOSPITAL ENCOUNTER (OUTPATIENT)
Facility: HOSPITAL | Age: 84
Discharge: HOME | End: 2024-12-03
Attending: INTERNAL MEDICINE | Admitting: INTERNAL MEDICINE
Payer: MEDICARE

## 2024-12-03 VITALS
SYSTOLIC BLOOD PRESSURE: 122 MMHG | BODY MASS INDEX: 28.63 KG/M2 | WEIGHT: 216 LBS | HEART RATE: 62 BPM | DIASTOLIC BLOOD PRESSURE: 74 MMHG | HEIGHT: 73 IN | TEMPERATURE: 97 F | OXYGEN SATURATION: 100 % | RESPIRATION RATE: 20 BRPM

## 2024-12-03 DIAGNOSIS — K31.7 GASTRIC POLYPS: ICD-10-CM

## 2024-12-03 PROCEDURE — 43235 EGD DIAGNOSTIC BRUSH WASH: CPT | Mod: ,,, | Performed by: INTERNAL MEDICINE

## 2024-12-03 PROCEDURE — 37000009 HC ANESTHESIA EA ADD 15 MINS: Performed by: INTERNAL MEDICINE

## 2024-12-03 PROCEDURE — 63600175 PHARM REV CODE 636 W HCPCS: Performed by: STUDENT IN AN ORGANIZED HEALTH CARE EDUCATION/TRAINING PROGRAM

## 2024-12-03 PROCEDURE — 43235 EGD DIAGNOSTIC BRUSH WASH: CPT | Performed by: INTERNAL MEDICINE

## 2024-12-03 PROCEDURE — 25000003 PHARM REV CODE 250: Performed by: STUDENT IN AN ORGANIZED HEALTH CARE EDUCATION/TRAINING PROGRAM

## 2024-12-03 PROCEDURE — 37000008 HC ANESTHESIA 1ST 15 MINUTES: Performed by: INTERNAL MEDICINE

## 2024-12-03 RX ORDER — PROCHLORPERAZINE EDISYLATE 5 MG/ML
5 INJECTION INTRAMUSCULAR; INTRAVENOUS EVERY 30 MIN PRN
OUTPATIENT
Start: 2024-12-03

## 2024-12-03 RX ORDER — HYDROMORPHONE HYDROCHLORIDE 1 MG/ML
0.2 INJECTION, SOLUTION INTRAMUSCULAR; INTRAVENOUS; SUBCUTANEOUS EVERY 5 MIN PRN
OUTPATIENT
Start: 2024-12-03

## 2024-12-03 RX ORDER — ONDANSETRON HYDROCHLORIDE 2 MG/ML
4 INJECTION, SOLUTION INTRAVENOUS ONCE AS NEEDED
OUTPATIENT
Start: 2024-12-03 | End: 2036-05-01

## 2024-12-03 RX ORDER — FENTANYL CITRATE 50 UG/ML
INJECTION, SOLUTION INTRAMUSCULAR; INTRAVENOUS
Status: DISCONTINUED | OUTPATIENT
Start: 2024-12-03 | End: 2024-12-03

## 2024-12-03 RX ORDER — PROPOFOL 10 MG/ML
VIAL (ML) INTRAVENOUS CONTINUOUS PRN
Status: DISCONTINUED | OUTPATIENT
Start: 2024-12-03 | End: 2024-12-03

## 2024-12-03 RX ORDER — FENTANYL CITRATE 50 UG/ML
25 INJECTION, SOLUTION INTRAMUSCULAR; INTRAVENOUS EVERY 5 MIN PRN
OUTPATIENT
Start: 2024-12-03

## 2024-12-03 RX ORDER — SODIUM CHLORIDE 9 MG/ML
INJECTION, SOLUTION INTRAVENOUS CONTINUOUS
Status: DISCONTINUED | OUTPATIENT
Start: 2024-12-03 | End: 2024-12-04 | Stop reason: HOSPADM

## 2024-12-03 RX ORDER — DIPHENHYDRAMINE HYDROCHLORIDE 50 MG/ML
25 INJECTION INTRAMUSCULAR; INTRAVENOUS EVERY 6 HOURS PRN
OUTPATIENT
Start: 2024-12-03

## 2024-12-03 RX ORDER — LIDOCAINE HYDROCHLORIDE 20 MG/ML
INJECTION INTRAVENOUS
Status: DISCONTINUED | OUTPATIENT
Start: 2024-12-03 | End: 2024-12-03

## 2024-12-03 RX ORDER — PROPOFOL 10 MG/ML
VIAL (ML) INTRAVENOUS
Status: DISCONTINUED | OUTPATIENT
Start: 2024-12-03 | End: 2024-12-03

## 2024-12-03 RX ADMIN — PROPOFOL 200 MCG/KG/MIN: 10 INJECTION, EMULSION INTRAVENOUS at 12:12

## 2024-12-03 RX ADMIN — BENZOCAINE 1 CAN: 200 SPRAY DENTAL; ORAL; PERIODONTAL at 12:12

## 2024-12-03 RX ADMIN — FENTANYL CITRATE 50 MCG: 50 INJECTION, SOLUTION INTRAMUSCULAR; INTRAVENOUS at 12:12

## 2024-12-03 RX ADMIN — GLYCOPYRROLATE 0.2 MG: 0.2 INJECTION, SOLUTION INTRAMUSCULAR; INTRAVENOUS at 12:12

## 2024-12-03 RX ADMIN — PROPOFOL 50 MG: 10 INJECTION, EMULSION INTRAVENOUS at 12:12

## 2024-12-03 RX ADMIN — LIDOCAINE HYDROCHLORIDE 60 MG: 20 INJECTION INTRAVENOUS at 12:12

## 2024-12-03 NOTE — H&P
Short Stay Endoscopy History and Physical    PCP - Xander Will MD  Referring Physician - Ajay Swain MD  [address]  [address]  [place] [address]    Procedure - egd  ASA - per anesthesia  Mallampati - per anesthesia  History of Anesthesia problems - no  Family history Anesthesia problems -  no   Plan of anesthesia - General    HPI:  This is a 84 y.o. male here for evaluation of: gastric polyps    Reflux - no  Dysphagia - no  Abdominal pain - no  Diarrhea - no    ROS:  Constitutional: No fevers, chills, No weight loss  CV: No chest pain  Pulm: No cough, No shortness of breath  Ophtho: No vision changes  GI: see HPI  Derm: No rash    Medical History:  has a past medical history of GERD (gastroesophageal reflux disease), HEARING LOSS, Hyperlipidemia, and Hypertension.    Surgical History:  has a past surgical history that includes Eye surgery; Colonoscopy (N/A, 7/19/2018); Colonoscopy (N/A, 4/27/2022); Esophagogastroduodenoscopy (N/A, 3/21/2023); Esophagogastroduodenoscopy (N/A, 5/1/2023); Treatment of cardiac arrhythmia (N/A, 6/12/2023); echocardiogram,transesophageal (N/A, 6/12/2023); and Esophagogastroduodenoscopy (N/A, 11/28/2023).    Family History: family history includes Cancer in his brother; Colon cancer in his brother; Heart disease in his brother; Liver cancer in his sister; Melanoma in his sister; Prostate cancer in his brother..    Social History:  reports that he quit smoking about 26 years ago. His smoking use included cigarettes. He started smoking about 57 years ago. He has a 30 pack-year smoking history. He quit smokeless tobacco use about 27 years ago. He reports current alcohol use of about 5.8 standard drinks of alcohol per week. He reports that he does not use drugs.    Review of patient's allergies indicates:  No Known Allergies    Medications:   Medications Prior to Admission   Medication Sig Dispense Refill Last Dose/Taking    amLODIPine (NORVASC) 5 MG tablet Take 1  tablet (5 mg total) by mouth once daily. 90 tablet 3 12/2/2024    atorvastatin (LIPITOR) 10 MG tablet Take 1 tablet (10 mg total) by mouth once daily. 90 tablet 3 12/2/2024    citalopram (CELEXA) 20 MG tablet TAKE 1 TABLET BY MOUTH EVERY DAY 90 tablet 3 12/2/2024    ferrous sulfate (FEOSOL) 325 mg (65 mg iron) Tab tablet TAKE 1 TABLET BY MOUTH ONCE DAILY. 90 tablet 1 Past Week    flecainide (TAMBOCOR) 100 MG Tab TAKE 1 TABLET BY MOUTH EVERY 12 HOURS 180 tablet 3 12/2/2024    omeprazole (PRILOSEC) 40 MG capsule Take 1 capsule (40 mg total) by mouth every morning. 90 capsule 3 12/2/2024    cetirizine (ZYRTEC) 5 MG tablet Take 1 tablet (5 mg total) by mouth once daily. 90 tablet 3     ELIQUIS 5 mg Tab TAKE 1 TABLET BY MOUTH TWICE A DAY 60 tablet 9 11/30/2024    hydrocortisone 2.5 % cream Apply topically 2 (two) times daily. 20 g 3        Physical Exam:    Vital Signs:   Vitals:    12/03/24 1118   BP: 139/70   Pulse: (!) 58   Resp: 16   Temp: 98 °F (36.7 °C)       General Appearance: Well appearing in no acute distress    Labs:  Lab Results   Component Value Date    WBC 6.33 03/08/2024    HGB 13.1 (L) 03/08/2024    HCT 40.4 03/08/2024     03/08/2024    CHOL 162 03/08/2024    TRIG 93 03/08/2024    HDL 48 03/08/2024    ALT 14 03/08/2024    AST 17 03/08/2024     03/08/2024    K 4.0 03/08/2024     03/08/2024    CREATININE 1.1 03/08/2024    BUN 12 03/08/2024    CO2 25 03/08/2024    TSH 0.831 03/08/2024    PSA 0.51 03/08/2024    INR 1.1 06/09/2023    HGBA1C 5.4 03/08/2024       I have explained the risks and benefits of this endoscopic procedure to the patient including but not limited to bleeding, inflammation, infection, perforation, and death.      Ajay Swain MD

## 2024-12-03 NOTE — ANESTHESIA PREPROCEDURE EVALUATION
12/03/2024  Adrian Healy is a 84 y.o., male.      Pre-op Assessment    I have reviewed the Patient Summary Reports.       I have reviewed the Medications.     Review of Systems  Anesthesia Hx:  No problems with previous Anesthesia               Denies Personal Hx of Anesthesia complications.                    Cardiovascular:     Hypertension                                    Hypertension     Atrial Fibrillation     Hepatic/GI:     GERD         Gerd          Musculoskeletal:  Arthritis        Arthritis          Neurological:      Arthritis                               Physical Exam    Airway:  No airway management difficulties anticipated  Dental:  No active dental issues noted  Chest/Lungs:  Clear to auscultation    Heart:  Rate: Normal  Rhythm: Regular Rhythm  Sounds: Normal        Anesthesia Plan  Type of Anesthesia, risks & benefits discussed:    Anesthesia Type: Gen Natural Airway  Informed Consent: Informed consent signed with the Patient and all parties understand the risks and agree with anesthesia plan.  All questions answered.   ASA Score: 3  Anesthesia Plan Notes: Chart reviewed. Patient seen and examined. Anesthesia plan discussed and questions answered. E-consent signed. Mao Sher MD    Ready For Surgery From Anesthesia Perspective.     .

## 2024-12-03 NOTE — [employer]ATION PATIENT INSTRUCTIONS
Discharge Summary/Instructions after an Endoscopic Procedure  Patient Name: Adrian Healy  Patient MRN: [id number]  Patient YOB: 1940  Tuesday, December 3, 2024  Ajay Swain MD  Dear patient,  As a result of recent federal legislation (The Federal Cures Act), you may   receive lab or pathology results from your procedure in your [facility]   account before your physician is able to contact you. Your physician or   their representative will relay the results to you with their   recommendations at their soonest availability.  Thank you,  RESTRICTIONS:  During your procedure today, you received medications for sedation.  These   medications may affect your judgment, balance and coordination.  Therefore,   for 24 hours, you have the following restrictions:   - DO NOT drive a car, operate machinery, make legal/financial decisions,   sign important papers or drink alcohol.    ACTIVITY:  Today: no heavy lifting, straining or running due to procedural   sedation/anesthesia.  The following day: return to full activity including work.  DIET:  Eat and drink normally unless instructed otherwise.     TREATMENT FOR COMMON SIDE EFFECTS:  - Mild abdominal pain, nausea, belching, bloating or excessive gas:  rest,   eat lightly and use a heating pad.  - Sore Throat: treat with throat lozenges and/or gargle with warm salt   water.  - Because air was used during the procedure, expelling large amounts of air   from your rectum or belching is normal.  - If a bowel prep was taken, you may not have a bowel movement for 1-3 days.    This is normal.  SYMPTOMS TO WATCH FOR AND REPORT TO YOUR PHYSICIAN:  1. Abdominal pain or bloating, other than gas cramps.  2. Chest pain.  3. Back pain.  4. Signs of infection such as: chills or fever occurring within 24 hours   after the procedure.  5. Rectal bleeding, which would show as bright red, maroon, or black stools.   (A tablespoon of blood from the rectum is not serious, especially if    hemorrhoids are present.)  6. Vomiting.  7. Weakness or dizziness.  GO DIRECTLY TO THE NEAREST EMERGENCY ROOM IF YOU HAVE ANY OF THE FOLLOWING:      Difficulty breathing              Chills and/or fever over 101 F   Persistent vomiting and/or vomiting blood   Severe abdominal pain   Severe chest pain   Black, tarry stools   Bleeding- more than one tablespoon   Any other symptom or condition that you feel may need urgent attention  Your doctor recommends these additional instructions:  If any biopsies were taken, your doctors clinic will contact you in 1 to 2   weeks with any results.  - Discharge patient to home.   - Resume previous diet.   - Continue present medications.   - Resume Eliquis (apixaban) at prior dose tomorrow.   - Repeat upper endoscopy for retreatment if blood counts drift down while on   Eliquis.  For questions, problems or results please call your physician - Ajay Swain MD at Work:  [phone].  [facility], EMERGENCY ROOM PHONE NUMBER: [phone]  IF A COMPLICATION OR EMERGENCY SITUATION ARISES AND YOU ARE UNABLE TO REACH   YOUR PHYSICIAN - GO DIRECTLY TO THE EMERGENCY ROOM.  Ajay Swain MD  12/3/2024 12:16:58 PM  This report has been verified and signed electronically.  Dear patient,  As a result of recent federal legislation (The Federal Cures Act), you may   receive lab or pathology results from your procedure in your [facility]   account before your physician is able to contact you. Your physician or   their representative will relay the results to you with their   recommendations at their soonest availability.  Thank you,  [employer]ATION

## 2024-12-03 NOTE — TRANSFER OF CARE
"Anesthesia Transfer of Care Note    Patient: Adrian Healy    Procedure(s) Performed: Procedure(s) (LRB):  EGD (ESOPHAGOGASTRODUODENOSCOPY) (N/A)    Patient location: [facility]    Anesthesia Type: general    Transport from OR: Transported from OR on 6-10 L/min O2 by face mask with adequate spontaneous ventilation    Post pain: adequate analgesia    Post assessment: no apparent anesthetic complications and tolerated procedure well    Post vital signs: stable    Level of consciousness: alert and awake    Nausea/Vomiting: no nausea/vomiting    Complications: none    Transfer of care protocol was followed      Last vitals: Visit Vitals  /65 (Patient Position: Lying)   Pulse (!) 58   Temp 36.7 °C (98 °F) (Temporal)   Resp 16   Ht 6' 1" (1.854 m)   Wt 98 kg (216 lb)   SpO2 99%   BMI 28.50 kg/m²     "

## 2024-12-04 NOTE — ANESTHESIA POSTPROCEDURE EVALUATION
Anesthesia Post Evaluation    Patient: Adrian Healy    Procedure(s) Performed: Procedure(s) (LRB):  EGD (ESOPHAGOGASTRODUODENOSCOPY) (N/A)    Final Anesthesia Type: general      Patient location during evaluation: [facility]  Patient participation: Yes- Able to Participate  Level of consciousness: awake and alert  Post-procedure vital signs: reviewed and stable  Pain management: adequate  Airway patency: patent    PONV status at discharge: No PONV  Anesthetic complications: no      Cardiovascular status: blood pressure returned to baseline  Respiratory status: unassisted  Hydration status: euvolemic  Follow-up not needed.              Vitals Value Taken Time   /74 12/03/24 1300   Temp 36.3 °C (97.4 °F) 12/03/24 1300   Pulse 63 12/03/24 1302   Resp 20 12/03/24 1300   SpO2 100 % 12/03/24 1302   Vitals shown include unfiled device data.      No case tracking events are documented in the log.      Pain/Yuri Score: Yuri Score: 10 (12/3/2024 12:45 PM)

## 2025-02-12 DIAGNOSIS — I48.91 UNSPECIFIED ATRIAL FIBRILLATION: ICD-10-CM

## 2025-02-13 RX ORDER — APIXABAN 5 MG/1
5 TABLET, FILM COATED ORAL 2 TIMES DAILY
Qty: 60 TABLET | Refills: 11 | Status: SHIPPED | OUTPATIENT
Start: 2025-02-13

## 2025-02-20 ENCOUNTER — PATIENT MESSAGE (OUTPATIENT)
Dept: INTERNAL MEDICINE | Facility: CLINIC | Age: 85
End: 2025-02-20
Payer: MEDICARE

## 2025-02-21 ENCOUNTER — TELEPHONE (OUTPATIENT)
Dept: SURGERY | Facility: CLINIC | Age: 85
End: 2025-02-21
Payer: MEDICARE

## 2025-02-21 ENCOUNTER — HOSPITAL ENCOUNTER (EMERGENCY)
Facility: HOSPITAL | Age: 85
Discharge: HOME | End: 2025-02-21
Attending: EMERGENCY MEDICINE
Payer: MEDICARE

## 2025-02-21 VITALS
HEIGHT: 73 IN | BODY MASS INDEX: 28.49 KG/M2 | OXYGEN SATURATION: 97 % | DIASTOLIC BLOOD PRESSURE: 68 MMHG | SYSTOLIC BLOOD PRESSURE: 135 MMHG | HEART RATE: 75 BPM | RESPIRATION RATE: 16 BRPM | WEIGHT: 215 LBS | TEMPERATURE: 98 F

## 2025-02-21 DIAGNOSIS — K62.9 ANAL LESION: Primary | ICD-10-CM

## 2025-02-21 PROCEDURE — 99281 EMR DPT VST MAYX REQ PHY/QHP: CPT

## 2025-02-21 NOTE — DISCHARGE INSTRUCTIONS
It is unclear what this lesion in your anal area is.  It could be an open external hemorrhoid with a clot in the middle.  The ulcerative nature of it makes me also worried for possible tumor/growth which needs to be followed up.    A referral was placed to colorectal surgery there number was given here in this document please call them today for a follow up appointment.  Follow up with them within 1 week.    Please keep your stool soft with over-the-counter MiraLax.  You can continue the preparation H.    If you develop any large amount of bleeding, dizziness, weakness, anal pain, inability to pass a bowel movement, or any other new, worsening worrisome symptoms please return emergency department.

## 2025-02-21 NOTE — ED NOTES
Patient identifiers verified and correct for  Mr Healy  C/C: \ Blood on tissue SEE NN  APPEARANCE: awake and alert in NAD. PAIN  0/10  SKIN: warm, dry and intact. No breakdown or bruising.  MUSCULOSKELETAL: Patient moving all extremities spontaneously, no obvious swelling or deformities noted. Ambulates independently.  RESPIRATORY: Denies shortness of breath.Respirations unlabored.   CARDIAC: Denies CP, 2+ distal pulses; no peripheral edema  ABDOMEN: ABdomen soft, denies pain, reports blood on tissue, not in toilet  : voids spontaneously, denies difficulty  Neurologic: AAO x 4; follows commands equal strength in all extremities; denies numbness/tingling. Denies dizziness  denies new weknaess

## 2025-02-21 NOTE — TELEPHONE ENCOUNTER
Spoke with patient, offered appt with Dr Garcia on Monday 2/24 at 2:20p. Patient accepted. Reviewed appt details. Patient is on Eliquis, reports minimal bleeding and denies any pain at this time. Reviewed signs and symptoms warranting and ED visit.

## 2025-02-21 NOTE — ED PROVIDER NOTES
Encounter Date: 2/21/2025       History     Chief Complaint   Patient presents with    Hemorrhoids     Bleeding hemorrhoid not bleeding heavy just seeping blood     HPI  84-year-old male with past medical history as noted below, on Eliquis for AFib, coming in with 1 week of bleeding anal area.  He says initially he had some anal pain which has since resolved and now is having oozing of scant bloody material.  It is both with bowel movements in between bowel movements that it is oozing in bloody.  He says his stools have been slightly hard but he is having bowel movements he has been using stool softeners.  He also tried preparation H.    No dizziness, shortness of breath, abdominal pain.  He does note that his brother has anal cancer.     Review of patient's allergies indicates:  No Known Allergies  Past Medical History:   Diagnosis Date    GERD (gastroesophageal reflux disease)     HEARING LOSS     Hyperlipidemia     Hypertension      Past Surgical History:   Procedure Laterality Date    COLONOSCOPY N/A 7/19/2018    Procedure: COLONOSCOPY;  Surgeon: Abilio Ferrara MD;  Location: [facility] (4TH FLR);  Service: Endoscopy;  Laterality: N/A;    COLONOSCOPY N/A 4/27/2022    Procedure: COLONOSCOPY;  Surgeon: Rudy Grace MD;  Location: [facility] (4TH FLR);  Service: Endoscopy;  Laterality: N/A;  fully vaccinated    ECHOCARDIOGRAM,TRANSESOPHAGEAL N/A 6/12/2023    Procedure: Transesophageal echo (SOILA) intra-procedure log documentation;  [facility];  Location: [facility] EP LAB;  Service: Cardiology;  Laterality: N/A;    ESOPHAGOGASTRODUODENOSCOPY N/A 3/21/2023    Procedure: ESOPHAGOGASTRODUODENOSCOPY (EGD);  Surgeon: Wilmar Ordaz MD;  Location: [facility] (4TH FLR);  Service: Endoscopy;  Laterality: N/A;  inst portal-tb    ESOPHAGOGASTRODUODENOSCOPY N/A 5/1/2023    Procedure: EGD (ESOPHAGOGASTRODUODENOSCOPY);  Surgeon: Ajay Swain MD;  Location: [facility] ENDO (2ND FLR);  Service: Endoscopy;   Laterality: N/A;  4/13/23-Instructions via portal-DS  4/25 pre call no answer    ESOPHAGOGASTRODUODENOSCOPY N/A 11/28/2023    Procedure: EGD (ESOPHAGOGASTRODUODENOSCOPY);  Surgeon: Ajay Swain MD;  Location: [facility] (2ND FLR);  Service: Endoscopy;  Laterality: N/A;  Approval to hold Eliquis rec'd from Dr. Pillai (see telephone encounter 10/27/23)-DS  10/27/23-Instructions via portal-DS  11/22/23-Precall complete-DS    ESOPHAGOGASTRODUODENOSCOPY N/A 12/3/2024    Procedure: EGD (ESOPHAGOGASTRODUODENOSCOPY);  Surgeon: Ajay Swain MD;  Location: [facility] ENDO (2ND FLR);  Service: Endoscopy;  Laterality: N/A;  EMR retreatment.   10/17/24: instructions sent via portal. TE sent to MD Queen, waiting for approval-GD  11/26 PRE-CALL ATTEMPTED-LM/RT  12/2 Precall complete SS    EYE SURGERY      TREATMENT OF CARDIAC ARRHYTHMIA N/A 6/12/2023    Procedure: Cardioversion or Defibrillation;  Surgeon: SHARI Lagunas MD;  Location: [facility] EP LAB;  Service: Cardiology;  Laterality: N/A;  AF, SOILA, DCCV, MAC, EH, 3 Prep     Family History   Problem Relation Name Age of Onset    Liver cancer Sister      Melanoma Sister      Cancer Brother      Heart disease Brother          pacemaker    Colon cancer Brother      Prostate cancer Brother       Social History[1]  Review of Systems    Physical Exam     Initial Vitals [02/21/25 1216]   BP Pulse Resp Temp SpO2   132/67 74 16 97.7 °F (36.5 °C) 97 %      MAP       --         Physical Exam    Physical Exam:  CONSTITUTIONAL: Well developed, well nourished, in no acute distress.  HENT: Normocephalic, atraumatic    EYES: Sclerae anicteric, conjunctiva pink.  NECK: Supple, no thyroid enlargement  RESPIRATORY: Speaking in full sentences. Breathing comfortably.  ABDOMEN: Soft and nontender, no masses, no rebound or guarding   RECTAL:  Rectal area is soft without fluctuance or induration there is a 1.5 cm ulceration at the left anal verge with a area of clot/dark tissue in the center.  No  significant tenderness on palpation of this lesion.  No active bleeding.  No induration.  NEUROLOGIC: Alert, interacting normally. No facial droop. Voice is clear. Speech is fluent.  MSK: Moving all four extremities.  SKIN: Warm and dry. No visible rash on exposed areas of skin.    Psych: Mood and affect normal.             ED Course   Procedures  Labs Reviewed   HEPATITIS C ANTIBODY   HIV 1 / 2 ANTIBODY          Imaging Results    None          Medications - No data to display  Medical Decision Making  Amount and/or Complexity of Data Reviewed  Labs: ordered.      84-year-old male with past history as noted coming in with anal lesion slowly oozing blood for 1 week.  Initially painful but now pain less.  Exam as above and pictured it as above.    Question whether this is a external hemorrhoid that has since opened in his in his dangerous of healing versus some kind of tumor growth.     He has no clinical signs or symptoms of anemia patient for labs or imaging at this time.  He is able to have bowel movements.    Abdomen is entirely benign, no abdominal pain not consistent with dangerous abdominal pathology.    Will recommend continued preparation H, warm Sitz bath, keeping the stool soft with MiraLax.  Referral placed for colorectal for close outpatient follow-up advised patient to call for an appointment today.    ED return precautions for any large amount of bleeding dizziness, weakness inability to have a bowel movement, development of anal pain, or any other new, worsening worrisome symptoms.    Findings of ED work up and return precautions verbally explained to patient. Patient agrees with discharge plan, return instructions and verbalizes understanding of return precautions.                                       Clinical Impression:  Final diagnoses:  [K62.9] Anal lesion (Primary)          ED Disposition Condition    Discharge Stable          ED Prescriptions    None       Follow-up Information       Follow up  With Specialties Details Why Contact Info Additional Information    [facility]al [facility] In 1 week  [address]  [facility] [address] [phone] [facility] 4th Floor Please park in [facility] and use [facility] elevator                 [1]   Social History  Tobacco Use    Smoking status: Former     Current packs/day: 0.00     Average packs/day: 1 pack/day for 30.0 years (30.0 ttl pk-yrs)     Types: Cigarettes     Start date: 1967     Quit date: 1997     Years since quittin.2    Smokeless tobacco: Former     Quit date: 12/10/1997   Substance Use Topics    Alcohol use: Yes     Alcohol/week: 5.8 standard drinks of alcohol     Types: 7 Standard drinks or equivalent per week     Comment: rum/coke 3-4 daily    Drug use: No        Peter Khanna MD  25 1424

## 2025-02-21 NOTE — TELEPHONE ENCOUNTER
Called pt to triage based on message. No answer. Left message to return call. Will also send a portal message.

## 2025-02-21 NOTE — TELEPHONE ENCOUNTER
----- Message from ARTURO Jones sent at 2/21/2025  5:01 PM CST -----  Regarding: FW: Appt  Contact: [phone]    ----- Message -----  From: Mora Diaz  Sent: 2/21/2025   2:32 PM CST  To: [facility] Clinical Suppo#  Subject: Appt                                             Type:  Sooner Appointment RequestCaller is requesting a sooner appointment.  Caller declined first available appointment listed below.  Caller will not accept being placed on the waitlist and is requesting a message be sent to doctor.Name of Caller: Adrian Healy When is the first available appointment? 3/19 Symptoms: Anal lesion/ Bleeding Would the patient rather a call back or a response via [facility]? Call Best Call Back Number: [phone]

## 2025-02-21 NOTE — TELEPHONE ENCOUNTER
"Pt transferred to me from phone staff. Pt states he has been experiencing rectal bleeding x1week. Pt states the bleeding has been constant and he describes it as "a good amount". Pt denies any other sx at this time. Pt does c/o of a hemorrhoid that he noticed last week after straining. Pt is also on a blood thinner for afib. Explained that it would be best for him to have someone drive him to the  ED today to be evaluated. Pt agreed and verbalized understanding.   "

## 2025-02-22 DIAGNOSIS — Z00.00 ENCOUNTER FOR MEDICARE ANNUAL WELLNESS EXAM: ICD-10-CM

## 2025-02-24 ENCOUNTER — OFFICE VISIT (OUTPATIENT)
Dept: SURGERY | Facility: CLINIC | Age: 85
End: 2025-02-24
Payer: MEDICARE

## 2025-02-24 VITALS
HEART RATE: 79 BPM | SYSTOLIC BLOOD PRESSURE: 113 MMHG | DIASTOLIC BLOOD PRESSURE: 70 MMHG | WEIGHT: 218.5 LBS | RESPIRATION RATE: 18 BRPM | BODY MASS INDEX: 28.96 KG/M2 | HEIGHT: 73 IN

## 2025-02-24 DIAGNOSIS — K64.5 THROMBOSED EXTERNAL HEMORRHOID: ICD-10-CM

## 2025-02-24 PROCEDURE — 99999 PR PBB SHADOW E&M-EST. PATIENT-LVL III: CPT | Mod: PBBFAC,,, | Performed by: COLON & RECTAL SURGERY

## 2025-02-24 PROCEDURE — 99213 OFFICE O/P EST LOW 20 MIN: CPT | Mod: PBBFAC | Performed by: COLON & RECTAL SURGERY

## 2025-02-24 PROCEDURE — 99202 OFFICE O/P NEW SF 15 MIN: CPT | Mod: S$PBB,,, | Performed by: COLON & RECTAL SURGERY

## 2025-03-07 ENCOUNTER — LAB VISIT (OUTPATIENT)
Dept: LAB | Facility: HOSPITAL | Age: 85
End: 2025-03-07
Attending: INTERNAL MEDICINE
Payer: MEDICARE

## 2025-03-07 DIAGNOSIS — I10 PRIMARY HYPERTENSION: ICD-10-CM

## 2025-03-07 DIAGNOSIS — Z00.00 ROUTINE PHYSICAL EXAMINATION: ICD-10-CM

## 2025-03-07 DIAGNOSIS — M25.561 CHRONIC PAIN OF RIGHT KNEE: ICD-10-CM

## 2025-03-07 DIAGNOSIS — H90.3 SENSORINEURAL HEARING LOSS (SNHL) OF BOTH EARS: ICD-10-CM

## 2025-03-07 DIAGNOSIS — Z12.5 SCREENING FOR PROSTATE CANCER: ICD-10-CM

## 2025-03-07 DIAGNOSIS — E78.2 MIXED HYPERLIPIDEMIA: ICD-10-CM

## 2025-03-07 DIAGNOSIS — I48.0 PAROXYSMAL ATRIAL FIBRILLATION: ICD-10-CM

## 2025-03-07 DIAGNOSIS — G89.29 CHRONIC PAIN OF RIGHT KNEE: ICD-10-CM

## 2025-03-07 DIAGNOSIS — D50.8 OTHER IRON DEFICIENCY ANEMIA: ICD-10-CM

## 2025-03-07 DIAGNOSIS — K21.9 GASTROESOPHAGEAL REFLUX DISEASE, UNSPECIFIED WHETHER ESOPHAGITIS PRESENT: ICD-10-CM

## 2025-03-07 DIAGNOSIS — M25.562 CHRONIC PAIN OF LEFT KNEE: ICD-10-CM

## 2025-03-07 DIAGNOSIS — G89.29 CHRONIC PAIN OF LEFT KNEE: ICD-10-CM

## 2025-03-07 DIAGNOSIS — F41.9 ANXIETY: ICD-10-CM

## 2025-03-07 DIAGNOSIS — E53.8 B12 DEFICIENCY: ICD-10-CM

## 2025-03-07 LAB
ALBUMIN SERPL BCP-MCNC: 3.7 G/DL (ref 3.5–5.2)
ALP SERPL-CCNC: 70 U/L (ref 40–150)
ALT SERPL W/O P-5'-P-CCNC: 14 U/L (ref 10–44)
ANION GAP SERPL CALC-SCNC: 10 MMOL/L (ref 8–16)
AST SERPL-CCNC: 25 U/L (ref 10–40)
BASOPHILS # BLD AUTO: 0.04 K/UL (ref 0–0.2)
BASOPHILS NFR BLD: 0.6 % (ref 0–1.9)
BILIRUB SERPL-MCNC: 0.6 MG/DL (ref 0.1–1)
BUN SERPL-MCNC: 14 MG/DL (ref 8–23)
CALCIUM SERPL-MCNC: 9.2 MG/DL (ref 8.7–10.5)
CHLORIDE SERPL-SCNC: 106 MMOL/L (ref 95–110)
CHOLEST SERPL-MCNC: 165 MG/DL (ref 120–199)
CHOLEST/HDLC SERPL: 3.2 {RATIO} (ref 2–5)
CO2 SERPL-SCNC: 24 MMOL/L (ref 23–29)
COMPLEXED PSA SERPL-MCNC: 0.43 NG/ML (ref 0–4)
CREAT SERPL-MCNC: 0.9 MG/DL (ref 0.5–1.4)
DIFFERENTIAL METHOD BLD: ABNORMAL
EOSINOPHIL # BLD AUTO: 0.3 K/UL (ref 0–0.5)
EOSINOPHIL NFR BLD: 3.5 % (ref 0–8)
ERYTHROCYTE [DISTWIDTH] IN BLOOD BY AUTOMATED COUNT: 14.3 % (ref 11.5–14.5)
EST. GFR  (NO RACE VARIABLE): >60 ML/MIN/1.73 M^2
ESTIMATED AVG GLUCOSE: 103 MG/DL (ref 68–131)
GLUCOSE SERPL-MCNC: 90 MG/DL (ref 70–110)
HBA1C MFR BLD: 5.2 % (ref 4–5.6)
HCT VFR BLD AUTO: 43.1 % (ref 40–54)
HDLC SERPL-MCNC: 51 MG/DL (ref 40–75)
HDLC SERPL: 30.9 % (ref 20–50)
HGB BLD-MCNC: 14.2 G/DL (ref 14–18)
IMM GRANULOCYTES # BLD AUTO: 0.02 K/UL (ref 0–0.04)
IMM GRANULOCYTES NFR BLD AUTO: 0.3 % (ref 0–0.5)
IRON SERPL-MCNC: 116 UG/DL (ref 45–160)
LDLC SERPL CALC-MCNC: 98.6 MG/DL (ref 63–159)
LYMPHOCYTES # BLD AUTO: 1.5 K/UL (ref 1–4.8)
LYMPHOCYTES NFR BLD: 21.2 % (ref 18–48)
MCH RBC QN AUTO: 31 PG (ref 27–31)
MCHC RBC AUTO-ENTMCNC: 32.9 G/DL (ref 32–36)
MCV RBC AUTO: 94 FL (ref 82–98)
MONOCYTES # BLD AUTO: 0.5 K/UL (ref 0.3–1)
MONOCYTES NFR BLD: 7.1 % (ref 4–15)
NEUTROPHILS # BLD AUTO: 4.8 K/UL (ref 1.8–7.7)
NEUTROPHILS NFR BLD: 67.3 % (ref 38–73)
NONHDLC SERPL-MCNC: 114 MG/DL
NRBC BLD-RTO: 0 /100 WBC
PLATELET # BLD AUTO: 246 K/UL (ref 150–450)
PMV BLD AUTO: 10.7 FL (ref 9.2–12.9)
POTASSIUM SERPL-SCNC: 4.3 MMOL/L (ref 3.5–5.1)
PROT SERPL-MCNC: 6.6 G/DL (ref 6–8.4)
RBC # BLD AUTO: 4.58 M/UL (ref 4.6–6.2)
SATURATED IRON: 27 % (ref 20–50)
SODIUM SERPL-SCNC: 140 MMOL/L (ref 136–145)
TOTAL IRON BINDING CAPACITY: 422 UG/DL (ref 250–450)
TRANSFERRIN SERPL-MCNC: 285 MG/DL (ref 200–375)
TRIGL SERPL-MCNC: 77 MG/DL (ref 30–150)
TSH SERPL DL<=0.005 MIU/L-ACNC: 1.1 UIU/ML (ref 0.4–4)
VIT B12 SERPL-MCNC: 558 PG/ML (ref 210–950)
WBC # BLD AUTO: 7.09 K/UL (ref 3.9–12.7)

## 2025-03-07 PROCEDURE — 80053 COMPREHEN METABOLIC PANEL: CPT | Performed by: INTERNAL MEDICINE

## 2025-03-07 PROCEDURE — 82607 VITAMIN B-12: CPT | Performed by: INTERNAL MEDICINE

## 2025-03-07 PROCEDURE — 84153 ASSAY OF PSA TOTAL: CPT | Performed by: INTERNAL MEDICINE

## 2025-03-07 PROCEDURE — 83036 HEMOGLOBIN GLYCOSYLATED A1C: CPT | Performed by: INTERNAL MEDICINE

## 2025-03-07 PROCEDURE — 85025 COMPLETE CBC W/AUTO DIFF WBC: CPT | Performed by: INTERNAL MEDICINE

## 2025-03-07 PROCEDURE — 80061 LIPID PANEL: CPT | Performed by: INTERNAL MEDICINE

## 2025-03-07 PROCEDURE — 84443 ASSAY THYROID STIM HORMONE: CPT | Performed by: INTERNAL MEDICINE

## 2025-03-07 PROCEDURE — 83540 ASSAY OF IRON: CPT | Performed by: INTERNAL MEDICINE

## 2025-03-20 ENCOUNTER — OFFICE VISIT (OUTPATIENT)
Dept: INTERNAL MEDICINE | Facility: CLINIC | Age: 85
End: 2025-03-20
Payer: MEDICARE

## 2025-03-20 VITALS
HEART RATE: 74 BPM | RESPIRATION RATE: 19 BRPM | HEIGHT: 73 IN | DIASTOLIC BLOOD PRESSURE: 59 MMHG | BODY MASS INDEX: 29.13 KG/M2 | SYSTOLIC BLOOD PRESSURE: 111 MMHG | WEIGHT: 219.81 LBS | OXYGEN SATURATION: 97 %

## 2025-03-20 DIAGNOSIS — Z12.5 SCREENING FOR PROSTATE CANCER: ICD-10-CM

## 2025-03-20 DIAGNOSIS — I48.0 PAROXYSMAL ATRIAL FIBRILLATION: ICD-10-CM

## 2025-03-20 DIAGNOSIS — D50.8 OTHER IRON DEFICIENCY ANEMIA: ICD-10-CM

## 2025-03-20 DIAGNOSIS — I10 PRIMARY HYPERTENSION: Primary | ICD-10-CM

## 2025-03-20 DIAGNOSIS — K21.9 GASTROESOPHAGEAL REFLUX DISEASE, UNSPECIFIED WHETHER ESOPHAGITIS PRESENT: ICD-10-CM

## 2025-03-20 DIAGNOSIS — F41.9 ANXIETY: ICD-10-CM

## 2025-03-20 DIAGNOSIS — E78.2 MIXED HYPERLIPIDEMIA: ICD-10-CM

## 2025-03-20 DIAGNOSIS — R73.09 ELEVATED GLUCOSE LEVEL: ICD-10-CM

## 2025-03-20 PROCEDURE — 99999 PR PBB SHADOW E&M-EST. PATIENT-LVL III: CPT | Mod: PBBFAC,,, | Performed by: INTERNAL MEDICINE

## 2025-03-20 PROCEDURE — G2211 COMPLEX E/M VISIT ADD ON: HCPCS | Mod: S$PBB,,, | Performed by: INTERNAL MEDICINE

## 2025-03-20 PROCEDURE — 99213 OFFICE O/P EST LOW 20 MIN: CPT | Mod: PBBFAC | Performed by: INTERNAL MEDICINE

## 2025-03-20 PROCEDURE — 99214 OFFICE O/P EST MOD 30 MIN: CPT | Mod: S$PBB,,, | Performed by: INTERNAL MEDICINE

## 2025-03-20 RX ORDER — OMEPRAZOLE 40 MG/1
40 CAPSULE, DELAYED RELEASE ORAL EVERY MORNING
Qty: 90 CAPSULE | Refills: 3 | Status: SHIPPED | OUTPATIENT
Start: 2025-03-20

## 2025-03-20 RX ORDER — ATORVASTATIN CALCIUM 10 MG/1
10 TABLET, FILM COATED ORAL DAILY
Qty: 90 TABLET | Refills: 3 | Status: SHIPPED | OUTPATIENT
Start: 2025-03-20

## 2025-03-20 RX ORDER — CITALOPRAM 20 MG/1
20 TABLET, FILM COATED ORAL DAILY
Qty: 90 TABLET | Refills: 3 | Status: SHIPPED | OUTPATIENT
Start: 2025-03-20

## 2025-03-20 RX ORDER — AMLODIPINE BESYLATE 5 MG/1
5 TABLET ORAL DAILY
Qty: 90 TABLET | Refills: 3 | Status: SHIPPED | OUTPATIENT
Start: 2025-03-20

## 2025-03-20 RX ORDER — HYDROCORTISONE 25 MG/G
CREAM TOPICAL 2 TIMES DAILY
Qty: 20 G | Refills: 3 | Status: CANCELLED | OUTPATIENT
Start: 2025-03-20

## 2025-03-20 NOTE — PROGRESS NOTES
HPI  History of Present Illness    CHIEF COMPLAINT:  Adrian presents for a follow-up visit to discuss recent lab results and medication management.    HPI:  Long-time patient, overall doing okay.  Feeling is age, slowing down with some difficulty exercising because of right knee pain.  Minimal to no pain if not exercising but otherwise  Adrian reports improved condition since resolving a recent hemorrhoid issue. He mentions taking iron supplements, previously reduced to 3 times per week. Adrian's recent lab results show significantly improved iron levels, increasing from 39 to 116 in the most recent test. He also notes taking a stool softener, which has helped alleviate issues related to his previous hemorrhoid problem.    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 He reports that attempts at exercise, including walking, exacerbate his knee pain. Specifically, he can walk for 2 days, but by the 3rd day, he has pain that lasts for a week. Adrian mentions being evaluated by an orthopedic surgeon, who confirmed the presence of arthritis under the kneecap. Adrian tried physical therapy, which did not help and sometimes worsened his symptoms. When his knee hurts, his balance is affected, and he has nearly fallen while walking around the yard.    Adrian mentions he used to exercise regularly at a fitness center, but this routine was disrupted by COVID-19 restrictions. Since then, he has struggled to regain his previous level of activity. He has attempted to use weights at home but reports developing bursitis in his shoulder when he overexerts himself.    Adrian also notes he is due for a cardiogram next month with a cardiologist, which he has been having done annually. He mentions no longer requiring annual endoscopies unless he detects bleeding again.    Adrian denies any new symptoms or worsening of existing  conditions.    MEDICATIONS:  Adrian is using hydrocortisone cream for allergy-related itching. He has been taking an iron supplement three days a week, with half a prescription remaining. He is also using a stool softener to help with hemorrhoids. The iron supplement was reduced from daily to three days a week during the last visit. As of this visit, the iron supplement has been discontinued due to normal lab results.  Other labs reviewed and refilled    MEDICAL HISTORY:  Adrian has a history of hemorrhoids, anemia (which has been resolved with iron supplementation), polyps in the stomach, arthritis under the kneecap, and bursitis in the shoulder.    FAMILY HISTORY:  Family history is significant for brother with rectal cancer, who is currently undergoing chemotherapy.  Other family history reviewed    SURGICAL HISTORY:  Adrian underwent an endoscopy to examine stomach polyps.  Scopes reviewed    TEST RESULTS:  Adrian's iron level has improved from 39 to 116 (normal is 45). His RBC count (RBC) is slightly low, 0.2 points below the normal range. Hemoglobin, which previously dipped, has now improved. The CBC, kidney function, liver function, cholesterol, B12, blood sugar, prostate, and thyroid tests are all normal. Adrian's cholesterol levels are described as excellent. A cardiogram is scheduled for the beginning of next month with a doctor whose name is not specified.    SOCIAL HISTORY:  Alcohol: Quit drinking, formerly consumed alcohol in the afternoons  Smoking: Smoked in high school and through college Occupation: Retired  Marital status:     ROS:  Respiratory: +exertional dyspnea  Musculoskeletal: +joint pain, +pain with movement, +limb pain  Neurological: +balance issues             Review of Systems   Cardiovascular:  Negative for chest pain and leg swelling.   Gastrointestinal:  Negative for abdominal pain and nausea.   Musculoskeletal:  Positive for arthralgias and gait problem.    Psychiatric/Behavioral:  The patient is nervous/anxious (stable with medication).        Past Medical History:   Diagnosis Date    GERD (gastroesophageal reflux disease)     HEARING LOSS     Hyperlipidemia     Hypertension      Past Surgical History:   Procedure Laterality Date    COLONOSCOPY N/A 7/19/2018    Procedure: COLONOSCOPY;  Surgeon: Abilio Ferrara MD;  Location: [facility] ENDO (4TH FLR);  Service: Endoscopy;  Laterality: N/A;    COLONOSCOPY N/A 4/27/2022    Procedure: COLONOSCOPY;  Surgeon: Rudy Grace MD;  Location: [facility] ENDO (4TH FLR);  Service: Endoscopy;  Laterality: N/A;  fully vaccinated    ECHOCARDIOGRAM,TRANSESOPHAGEAL N/A 6/12/2023    Procedure: Transesophageal echo (SOILA) intra-procedure log documentation;  Surgeon: [facility] Diagnostic Provider;  Location: [facility] EP LAB;  Service: Cardiology;  Laterality: N/A;    ESOPHAGOGASTRODUODENOSCOPY N/A 3/21/2023    Procedure: ESOPHAGOGASTRODUODENOSCOPY (EGD);  Surgeon: Wilmar Ordaz MD;  Location: [facility] (4TH FLR);  Service: Endoscopy;  Laterality: N/A;  inst portal-tb    ESOPHAGOGASTRODUODENOSCOPY N/A 5/1/2023    Procedure: EGD (ESOPHAGOGASTRODUODENOSCOPY);  Surgeon: Ajay Swain MD;  Location: [facility] ENDO (2ND FLR);  Service: Endoscopy;  Laterality: N/A;  4/13/23-Instructions via portal-DS  4/25 pre call no answer    ESOPHAGOGASTRODUODENOSCOPY N/A 11/28/2023    Procedure: EGD (ESOPHAGOGASTRODUODENOSCOPY);  Surgeon: Ajay Swain MD;  Location: [facility] ENDO (2ND FLR);  Service: Endoscopy;  Laterality: N/A;  Approval to hold Eliquis rec'd from Dr. Pillai (see telephone encounter 10/27/23)-DS  10/27/23-Instructions via portal-DS  11/22/23-Precall complete-DS    ESOPHAGOGASTRODUODENOSCOPY N/A 12/3/2024    Procedure: EGD (ESOPHAGOGASTRODUODENOSCOPY);  Surgeon: Ajay Swain MD;  Location: [facility] ENDO (2ND FLR);  Service: Endoscopy;  Laterality: N/A;  EMR retreatment.   10/17/24: instructions sent via portal. TE sent to MD Queen, waiting for  approval-GD  11/26 PRE-CALL ATTEMPTED-LM/RT  12/2 Precall complete SS    EYE SURGERY      TREATMENT OF CARDIAC ARRHYTHMIA N/A 6/12/2023    Procedure: Cardioversion or Defibrillation;  Surgeon: SHARI Lagunas MD;  Location: [facility] EP LAB;  Service: Cardiology;  Laterality: N/A;  AF, SOILA, DCCV, MAC, EH, 3 Prep      Problem List[1]       Objective:      Physical Exam    Lungs: All normal.       Physical Exam  Constitutional:       General: He is not in acute distress.     Appearance: He is well-developed.   HENT:      Head: Normocephalic and atraumatic.      Right Ear: Tympanic membrane, ear canal and external ear normal.      Left Ear: Tympanic membrane, ear canal and external ear normal.      Mouth/Throat:      Pharynx: No oropharyngeal exudate or posterior oropharyngeal erythema.   Eyes:      General: No scleral icterus.     Conjunctiva/sclera: Conjunctivae normal.      Pupils: Pupils are equal, round, and reactive to light.   Neck:      Thyroid: No thyromegaly.      Comments: No supraclavicular nodes palpated  Cardiovascular:      Rate and Rhythm: Normal rate and regular rhythm.      Pulses: Normal pulses.      Heart sounds: Normal heart sounds. No murmur heard.  Pulmonary:      Effort: Pulmonary effort is normal.      Breath sounds: Normal breath sounds. No wheezing.   Abdominal:      General: Bowel sounds are normal.      Palpations: Abdomen is soft. There is no mass.      Tenderness: There is no abdominal tenderness.   Musculoskeletal:         General: No tenderness.      Cervical back: Normal range of motion and neck supple.      Right lower leg: No edema.      Left lower leg: No edema.   Lymphadenopathy:      Cervical: No cervical adenopathy.   Skin:     Coloration: Skin is not jaundiced or pale.   Neurological:      General: No focal deficit present.      Mental Status: He is alert and oriented to person, place, and time.   Psychiatric:         Mood and Affect: Mood normal.         Behavior: Behavior  normal.           Assessment:       Problem List Items Addressed This Visit          Psychiatric    Anxiety    Relevant Orders    TSH    Lipid Panel    Hemoglobin A1C    Comprehensive Metabolic Panel    CBC Auto Differential    Iron and TIBC       Cardiac/Vascular    Hyperlipidemia    Relevant Orders    TSH    Lipid Panel    Hemoglobin A1C    Comprehensive Metabolic Panel    CBC Auto Differential    Iron and TIBC    Hypertension - Primary    Relevant Orders    TSH    Lipid Panel    Hemoglobin A1C    Comprehensive Metabolic Panel    CBC Auto Differential    Iron and TIBC    Paroxysmal atrial fibrillation    Relevant Orders    TSH    Lipid Panel    Hemoglobin A1C    Comprehensive Metabolic Panel    CBC Auto Differential    Iron and TIBC       GI    GERD (gastroesophageal reflux disease)    Relevant Orders    TSH    Lipid Panel    Hemoglobin A1C    Comprehensive Metabolic Panel    CBC Auto Differential    Iron and TIBC     Other Visit Diagnoses         Other iron deficiency anemia        Relevant Orders    TSH    Lipid Panel    Hemoglobin A1C    Comprehensive Metabolic Panel    CBC Auto Differential    Iron and TIBC      Elevated glucose level        Relevant Orders    TSH    Lipid Panel    Hemoglobin A1C    Comprehensive Metabolic Panel    CBC Auto Differential    Iron and TIBC      Screening for prostate cancer        Relevant Orders    PSA, Screening            Plan:       Adrian was seen today for annual exam.    Diagnoses and all orders for this visit:    Primary hypertension  -     TSH; Future  -     Lipid Panel; Future  -     Hemoglobin A1C; Future  -     Comprehensive Metabolic Panel; Future  -     CBC Auto Differential; Future  -     Iron and TIBC; Future    Mixed hyperlipidemia  -     TSH; Future  -     Lipid Panel; Future  -     Hemoglobin A1C; Future  -     Comprehensive Metabolic Panel; Future  -     CBC Auto Differential; Future  -     Iron and TIBC; Future    Paroxysmal atrial  fibrillation  Comments:  Continue Flecainide and Blood thinner. Monitor pulse which is stable today.  Orders:  -     TSH; Future  -     Lipid Panel; Future  -     Hemoglobin A1C; Future  -     Comprehensive Metabolic Panel; Future  -     CBC Auto Differential; Future  -     Iron and TIBC; Future    Gastroesophageal reflux disease, unspecified whether esophagitis present  -     TSH; Future  -     Lipid Panel; Future  -     Hemoglobin A1C; Future  -     Comprehensive Metabolic Panel; Future  -     CBC Auto Differential; Future  -     Iron and TIBC; Future    Anxiety  -     TSH; Future  -     Lipid Panel; Future  -     Hemoglobin A1C; Future  -     Comprehensive Metabolic Panel; Future  -     CBC Auto Differential; Future  -     Iron and TIBC; Future    Other iron deficiency anemia  -     TSH; Future  -     Lipid Panel; Future  -     Hemoglobin A1C; Future  -     Comprehensive Metabolic Panel; Future  -     CBC Auto Differential; Future  -     Iron and TIBC; Future    Elevated glucose level  -     TSH; Future  -     Lipid Panel; Future  -     Hemoglobin A1C; Future  -     Comprehensive Metabolic Panel; Future  -     CBC Auto Differential; Future  -     Iron and TIBC; Future    Screening for prostate cancer  -     PSA, Screening; Future    Other orders  -     amLODIPine (NORVASC) 5 MG tablet; Take 1 tablet (5 mg total) by mouth once daily.  -     atorvastatin (LIPITOR) 10 MG tablet; Take 1 tablet (10 mg total) by mouth once daily.  -     citalopram (CELEXA) 20 MG tablet; Take 1 tablet (20 mg total) by mouth once daily.  -     omeprazole (PRILOSEC) 40 MG capsule; Take 1 capsule (40 mg total) by mouth every morning.  The following orders have not been finalized:  -     Cancel: hydrocortisone 2.5 % cream         As this patient's PCP, I am actively managing and/or treating their chronic medical conditions including HTN, HLP, anemia and have been for at least 1 year. This includes, but is not limited to, medication  "management, coordination of care, documentation review from their specialists and labs/imaging review where pertinent.          Portions of this note may have been created with [employer] voice recognition software. Occasional "wrong-word" or "sound-a-like" substitutions may have occurred due to the inherent limitations of voice recognition software. Please, read the note carefully and recognize, using context, where substitutions have occurred.         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

## 2025-03-31 ENCOUNTER — OFFICE VISIT (OUTPATIENT)
Dept: SURGERY | Facility: CLINIC | Age: 85
End: 2025-03-31
Payer: MEDICARE

## 2025-03-31 VITALS
HEART RATE: 69 BPM | DIASTOLIC BLOOD PRESSURE: 76 MMHG | BODY MASS INDEX: 28.69 KG/M2 | RESPIRATION RATE: 18 BRPM | SYSTOLIC BLOOD PRESSURE: 132 MMHG | WEIGHT: 216.5 LBS | HEIGHT: 73 IN

## 2025-03-31 DIAGNOSIS — Z86.0100 HISTORY OF COLON POLYPS: ICD-10-CM

## 2025-03-31 DIAGNOSIS — K64.5 THROMBOSED EXTERNAL HEMORRHOID: Primary | ICD-10-CM

## 2025-03-31 DIAGNOSIS — Z80.0 FAMILY HISTORY OF COLON CANCER: ICD-10-CM

## 2025-03-31 PROCEDURE — 99213 OFFICE O/P EST LOW 20 MIN: CPT | Mod: PBBFAC | Performed by: COLON & RECTAL SURGERY

## 2025-03-31 PROCEDURE — 99212 OFFICE O/P EST SF 10 MIN: CPT | Mod: S$PBB,,, | Performed by: COLON & RECTAL SURGERY

## 2025-03-31 PROCEDURE — 99999 PR PBB SHADOW E&M-EST. PATIENT-LVL III: CPT | Mod: PBBFAC,,, | Performed by: COLON & RECTAL SURGERY

## 2025-04-02 NOTE — PROGRESS NOTES
"Subjective:       Patient ID: Adrian Healy is a 84 y.o. male.    Chief Complaint: Follow-up    HPI  83 yo F seen in ED 2/21 w onset of anal pain 1 week ago followed by bleeding - found to have "anal lesion" which on review of photos taken at the time appears c/w thrombosed external hemorrhoid with overlying skin erosion.  He presented 2/24/25 for further evaluation.  No pain.  No F/C.  He reports he has had "seeping and oozing" for the past 2 weeks.  He takes Eliquis for Afib.  No constipation/straining.  On exam he had a thrombosed external hemorrhoid left lateral position with eroded overlying skin and visible thrombus. Given lack of symptoms he was treated conservatively.    He returns today for follow-up. Doing well, no complaints - no pain, no further bleeding.    Last colonoscopy - 2022  - The examined portion of the ileum was normal.   - Four 3 to 8 mm polyps in the cecum, removed with a hot snare.  TA's  - One 10 mm polyp in the ascending colon, removed with a hot snare.  TA  - Two 3 to 5 mm polyps in the transverse colon, removed with a jumbo cold forceps. TA's  - Two 2 to 3 mm polyps in the rectum, removed with a hot snare. HP's  - Diverticulosis in the sigmoid colon.     + family hx of CRC - brother    Review of patient's allergies indicates:  No Known Allergies    Past Medical History:   Diagnosis Date    GERD (gastroesophageal reflux disease)     HEARING LOSS     Hyperlipidemia     Hypertension        Past Surgical History:   Procedure Laterality Date    COLONOSCOPY N/A 7/19/2018    Procedure: COLONOSCOPY;  Surgeon: Abilio Ferrara MD;  Location: [address]);  Service: Endoscopy;  Laterality: N/A;    COLONOSCOPY N/A 4/27/2022    Procedure: COLONOSCOPY;  Surgeon: Rudy Grace MD;  Location: [facility] ENDO ([address] [place] [address]);  Service: Endoscopy;  Laterality: N/A;  fully vaccinated    ECHOCARDIOGRAM,TRANSESOPHAGEAL N/A 6/12/2023    Procedure: Transesophageal echo (SOILA) intra-procedure log " documentation;  Surgeon: [facility] Diagnostic Provider;  Location: [facility] EP LAB;  Service: Cardiology;  Laterality: N/A;    ESOPHAGOGASTRODUODENOSCOPY N/A 3/21/2023    Procedure: ESOPHAGOGASTRODUODENOSCOPY (EGD);  Surgeon: Wilmar Ordaz MD;  Location: [facility] ENDO (4TH FLR);  Service: Endoscopy;  Laterality: N/A;  inst portal-tb    ESOPHAGOGASTRODUODENOSCOPY N/A 5/1/2023    Procedure: EGD (ESOPHAGOGASTRODUODENOSCOPY);  Surgeon: Ajay Swain MD;  Location: [facility] ENDO (2ND FLR);  Service: Endoscopy;  Laterality: N/A;  4/13/23-Instructions via portal-DS  4/25 pre call no answer    ESOPHAGOGASTRODUODENOSCOPY N/A 11/28/2023    Procedure: EGD (ESOPHAGOGASTRODUODENOSCOPY);  Surgeon: Ajay Swain MD;  Location: [facility] ENDO (2ND FLR);  Service: Endoscopy;  Laterality: N/A;  Approval to hold Marina rec'd from Dr. Pillai (see telephone encounter 10/27/23)-DS  10/27/23-Instructions via portal-DS  11/22/23-Precall complete-DS    ESOPHAGOGASTRODUODENOSCOPY N/A 12/3/2024    Procedure: EGD (ESOPHAGOGASTRODUODENOSCOPY);  Surgeon: Ajay Swain MD;  Location: [facility] ENDO (2ND FLR);  Service: Endoscopy;  Laterality: N/A;  EMR retreatment.   10/17/24: instructions sent via portal. TE sent to MD Queen, waiting for approval-GD  11/26 PRE-CALL ATTEMPTED-LM/RT  12/2 Precall complete SS    EYE SURGERY      TREATMENT OF CARDIAC ARRHYTHMIA N/A 6/12/2023    Procedure: Cardioversion or Defibrillation;  Surgeon: SHARI Lagunas MD;  Location: [facility] EP LAB;  Service: Cardiology;  Laterality: N/A;  AF, SOILA, DCCV, MAC, EH, 3 Prep       Current Medications[1]    Family History   Problem Relation Name Age of Onset    Liver cancer Sister      Melanoma Sister      Cancer Brother      Heart disease Brother          pacemaker    Colon cancer Brother      Prostate cancer Brother         Social History     Socioeconomic History    Marital status:    Tobacco Use    Smoking status: Former     Current packs/day: 0.00     Average packs/day: 1  pack/day for 30.0 years (30.0 ttl pk-yrs)     Types: Cigarettes     Start date: 1967     Quit date: 1997     Years since quittin.3    Smokeless tobacco: Former     Quit date: 12/10/1997   Substance and Sexual Activity    Alcohol use: Yes     Alcohol/week: 5.8 standard drinks of alcohol     Types: 7 Standard drinks or equivalent per week     Comment: rum/coke 3-4 daily    Drug use: No    Sexual activity: Never     Social Drivers of Health     Financial Resource Strain: Low Risk  (2020)    Overall Financial Resource Strain (CARDIA)     Difficulty of Paying Living Expenses: Not hard at all   Food Insecurity: No Food Insecurity (2020)    Hunger Vital Sign     Worried About Running Out of Food in the Last Year: Never true     Ran Out of Food in the Last Year: Never true   Transportation Needs: No Transportation Needs (2020)    PRAPARE - Transportation     Lack of Transportation (Medical): No     Lack of Transportation (Non-Medical): No   Physical Activity: Insufficiently Active (2020)    Exercise Vital Sign     Days of Exercise per Week: 3 days     Minutes of Exercise per Session: 40 min   Stress: No Stress Concern Present (2020)    [employer] - Occupational Stress Questionnaire     Feeling of Stress : Not at all       Review of Systems   Constitutional:  Negative for activity change, appetite change, chills, diaphoresis, fatigue, fever and unexpected weight change.   Gastrointestinal:  Negative for abdominal distention, abdominal pain, anal bleeding, blood in stool, constipation, diarrhea, nausea, rectal pain and vomiting.   All other systems reviewed and are negative.      Objective:      Physical Exam  Vitals reviewed. Exam conducted with a chaperone present.   Constitutional:       Appearance: He is well-developed.   HENT:      Head: Normocephalic and atraumatic.   Eyes:      Conjunctiva/sclera: Conjunctivae normal.   Pulmonary:      Effort:  Pulmonary effort is normal. No respiratory distress.   Abdominal:      General: There is no distension.      Palpations: Abdomen is soft. There is no mass.      Tenderness: There is no abdominal tenderness. There is no guarding or rebound.   Genitourinary:     Comments: Perineum - thrombosed external hemorrhoid left lateral completely resolved  Skin:     General: Skin is warm and dry.      Findings: No erythema.   Neurological:      Mental Status: He is alert and oriented to person, place, and time.         Lab Results   Component Value Date    WBC 7.09 03/07/2025    HGB 14.2 03/07/2025    HCT 43.1 03/07/2025    MCV 94 03/07/2025     03/07/2025     BMP  Lab Results   Component Value Date     03/07/2025    K 4.3 03/07/2025     03/07/2025    CO2 24 03/07/2025    BUN 14 03/07/2025    CREATININE 0.9 03/07/2025    CALCIUM 9.2 03/07/2025    ANIONGAP 10 03/07/2025    ESTGFRAFRICA >60.0 02/25/2022    EGFRNONAA >60.0 02/25/2022     CMP  Sodium   Date Value Ref Range Status   03/07/2025 140 136 - 145 mmol/L Final     Potassium   Date Value Ref Range Status   03/07/2025 4.3 3.5 - 5.1 mmol/L Final     Chloride   Date Value Ref Range Status   03/07/2025 106 95 - 110 mmol/L Final     CO2   Date Value Ref Range Status   03/07/2025 24 23 - 29 mmol/L Final     Glucose   Date Value Ref Range Status   03/07/2025 90 70 - 110 mg/dL Final     BUN   Date Value Ref Range Status   03/07/2025 14 8 - 23 mg/dL Final     Creatinine   Date Value Ref Range Status   03/07/2025 0.9 0.5 - 1.4 mg/dL Final     Calcium   Date Value Ref Range Status   03/07/2025 9.2 8.7 - 10.5 mg/dL Final     Total Protein   Date Value Ref Range Status   03/07/2025 6.6 6.0 - 8.4 g/dL Final     Albumin   Date Value Ref Range Status   03/07/2025 3.7 3.5 - 5.2 g/dL Final     Total Bilirubin   Date Value Ref Range Status   03/07/2025 0.6 0.1 - 1.0 mg/dL Final     Comment:     For infants and newborns, interpretation of results should be based  on  "gestational age, weight and in agreement with clinical  observations.    Premature Infant recommended reference ranges:  Up to 24 hours.............<8.0 mg/dL  Up to 48 hours............<12.0 mg/dL  3-5 days..................<15.0 mg/dL  6-29 days.................<15.0 mg/dL       Alkaline Phosphatase   Date Value Ref Range Status   03/07/2025 70 40 - 150 U/L Final     AST   Date Value Ref Range Status   03/07/2025 25 10 - 40 U/L Final     ALT   Date Value Ref Range Status   03/07/2025 14 10 - 44 U/L Final     Anion Gap   Date Value Ref Range Status   03/07/2025 10 8 - 16 mmol/L Final     eGFR if    Date Value Ref Range Status   02/25/2022 >60.0 >60 mL/min/1.73 m^2 Final     eGFR if non    Date Value Ref Range Status   02/25/2022 >60.0 >60 mL/min/1.73 m^2 Final     Comment:     Calculation used to obtain the estimated glomerular filtration  rate (eGFR) is the CKD-EPI equation.        No results found for: "CEA"  No results found for: "CRP"        Assessment:       1. Thrombosed external hemorrhoid    2. History of colon polyps    3. Family history of colon cancer        Plan:   -Increased fiber/fluid intake and daily fiber supplement  -Avoid trauma - moistened wipes, avoid straining, avoid spending prolonged periods on sitting on toilet  -Colace 100 mg bid as needed  -RTO prn    Dariel Garcia MD, FACS, FASCRS  , Department of Colon & Rectal Surgery     This note was created using voice recognition software, and may contain some unrecognized transcriptional errors.            [1]   Current Outpatient Medications   Medication Sig Dispense Refill    amLODIPine (NORVASC) 5 MG tablet Take 1 tablet (5 mg total) by mouth once daily. 90 tablet 3    atorvastatin (LIPITOR) 10 MG tablet Take 1 tablet (10 mg total) by mouth once daily. 90 tablet 3    cetirizine (ZYRTEC) 5 MG tablet Take 1 tablet (5 mg total) by mouth once daily. 90 tablet 3    citalopram (CELEXA) 20 MG tablet Take 1 " tablet (20 mg total) by mouth once daily. 90 tablet 3    ELIQUIS 5 mg Tab TAKE 1 TABLET BY MOUTH TWICE A DAY 60 tablet 11    flecainide (TAMBOCOR) 100 MG Tab TAKE 1 TABLET BY MOUTH EVERY 12 HOURS 180 tablet 3    hydrocortisone 2.5 % cream Apply topically 2 (two) times daily. 20 g 3    omeprazole (PRILOSEC) 40 MG capsule Take 1 capsule (40 mg total) by mouth every morning. 90 capsule 3     No current facility-administered medications for this visit.

## 2025-04-02 NOTE — PROGRESS NOTES
"Subjective:       Patient ID: Adrian Healy is a 84 y.o. male.    Chief Complaint: Hemorrhoids    HPI  85 yo F seen in ED 2/21 w onset of anal pain 1 week ago followed by bleeding - found to have "anal lesion" which on review of photos taken at the time appears c/w thrombosed external hemorrhoid with overlying skin erosion.  He presents today for further evaluation.  No pain.  No F/C.  He reports he has had "seeping and oozing" for the past 2 weeks.  He takes Eliquis for Afib.  No constipation/straining.      Last colonoscopy - 2022  - The examined portion of the ileum was normal.   - Four 3 to 8 mm polyps in the cecum, removed with a hot snare.  TA's  - One 10 mm polyp in the ascending colon, removed with a hot snare.  TA  - Two 3 to 5 mm polyps in the transverse colon, removed with a jumbo cold forceps. TA's  - Two 2 to 3 mm polyps in the rectum, removed with a hot snare. HP's  - Diverticulosis in the sigmoid colon.     + family hx of CRC - brother    Review of patient's allergies indicates:  No Known Allergies    Past Medical History:   Diagnosis Date    GERD (gastroesophageal reflux disease)     HEARING LOSS     Hyperlipidemia     Hypertension        Past Surgical History:   Procedure Laterality Date    COLONOSCOPY N/A 7/19/2018    Procedure: COLONOSCOPY;  Surgeon: Abilio Ferrara MD;  [facility]  Service: Endoscopy;  Laterality: N/A;    COLONOSCOPY N/A 4/27/2022    Procedure: COLONOSCOPY;  Surgeon: Rudy Grace MD;  [facility]  Service: Endoscopy;  Laterality: N/A;  fully vaccinated    ECHOCARDIOGRAM,TRANSESOPHAGEAL N/A 6/12/2023    Procedure: Transesophageal echo (SOILA) intra-procedure log documentation;  Surgeon: [facility] Diagnostic Provider;  Location: [facility] EP LAB;  Service: Cardiology;  Laterality: N/A;    ESOPHAGOGASTRODUODENOSCOPY N/A 3/21/2023    Procedure: ESOPHAGOGASTRODUODENOSCOPY (EGD);  Surgeon: Wilmar Ordaz MD;  Location: [facility] (4TH FLR);  " Service: Endoscopy;  Laterality: N/A;  inst portal-tb    ESOPHAGOGASTRODUODENOSCOPY N/A 2023    Procedure: EGD (ESOPHAGOGASTRODUODENOSCOPY);  Surgeon: Ajay Swain MD;  Location: [facility] (2ND FLR);  Service: Endoscopy;  Laterality: N/A;  23-Instructions via portal-DS   pre call no answer    ESOPHAGOGASTRODUODENOSCOPY N/A 2023    Procedure: EGD (ESOPHAGOGASTRODUODENOSCOPY);  Surgeon: Ajay Swain MD;  Location: [facility] ENDO (2ND FLR);  Service: Endoscopy;  Laterality: N/A;  Approval to hold Eliquis rec'd from Dr. Pillai (see telephone encounter 10/27/23)-DS  10/27/23-Instructions via portal-DS  23-Precall complete-DS    ESOPHAGOGASTRODUODENOSCOPY N/A 12/3/2024    Procedure: EGD (ESOPHAGOGASTRODUODENOSCOPY);  Surgeon: Ajay Swain MD;  Location: [facility] SILKE (2ND FLR);  Service: Endoscopy;  Laterality: N/A;  EMR retreatment.   10/17/24: instructions sent via portal. TE sent to MD Queen, waiting for approval-GD   PRE-CALL ATTEMPTED-LM/RT   Precall complete SS    EYE SURGERY      TREATMENT OF CARDIAC ARRHYTHMIA N/A 2023    Procedure: Cardioversion or Defibrillation;  Surgeon: SHARI Lagunas MD;  Location: [facility] EP LAB;  Service: Cardiology;  Laterality: N/A;  AF, SOILA, DCCV, MAC, EH, 3 Prep       Current Medications[1]    Family History   Problem Relation Name Age of Onset    Liver cancer Sister      Melanoma Sister      Cancer Brother      Heart disease Brother          pacemaker    Colon cancer Brother      Prostate cancer Brother         Social History     Socioeconomic History    Marital status:    Tobacco Use    Smoking status: Former     Current packs/day: 0.00     Average packs/day: 1 pack/day for 30.0 years (30.0 ttl pk-yrs)     Types: Cigarettes     Start date: 1967     Quit date: 1997     Years since quittin.3    Smokeless tobacco: Former     Quit date: 12/10/1997   Substance and Sexual Activity    Alcohol use: Yes     Alcohol/week: 5.8  standard drinks of alcohol     Types: 7 Standard drinks or equivalent per week     Comment: rum/coke 3-4 daily    Drug use: No    Sexual activity: Never     Social Drivers of Health     Financial Resource Strain: Low Risk  (2/11/2020)    Overall Financial Resource Strain (CARDIA)     Difficulty of Paying Living Expenses: Not hard at all   Food Insecurity: No Food Insecurity (2/11/2020)    Hunger Vital Sign     Worried About Running Out of Food in the Last Year: Never true     Ran Out of Food in the Last Year: Never true   Transportation Needs: No Transportation Needs (2/11/2020)    PRAPARE - Transportation     Lack of Transportation (Medical): No     Lack of Transportation (Non-Medical): No   Physical Activity: Insufficiently Active (2/11/2020)    Exercise Vital Sign     Days of Exercise per Week: 3 days     Minutes of Exercise per Session: 40 min   Stress: No Stress Concern Present (2/11/2020)    [employer] - Occupational Stress Questionnaire     Feeling of Stress : Not at all       Review of Systems   Constitutional:  Negative for activity change, appetite change, chills, diaphoresis, fatigue, fever and unexpected weight change.   Gastrointestinal:  Positive for anal bleeding. Negative for abdominal distention, abdominal pain, blood in stool, constipation, diarrhea, nausea, rectal pain and vomiting.   All other systems reviewed and are negative.      Objective:      Physical Exam  Vitals reviewed. Exam conducted with a chaperone present.   Constitutional:       Appearance: He is well-developed.   HENT:      Head: Normocephalic and atraumatic.   Eyes:      Conjunctiva/sclera: Conjunctivae normal.   Pulmonary:      Effort: Pulmonary effort is normal. No respiratory distress.   Abdominal:      General: There is no distension.      Palpations: Abdomen is soft. There is no mass.      Tenderness: There is no abdominal tenderness. There is no guarding or rebound.   Genitourinary:      Comments: Perineum - thrombosed external hemorrhoid left lateral with overlying skin erosion and visible thrombus, no active bleeding  Skin:     General: Skin is warm and dry.      Findings: No erythema.   Neurological:      Mental Status: He is alert and oriented to person, place, and time.         Lab Results   Component Value Date    WBC 7.09 03/07/2025    HGB 14.2 03/07/2025    HCT 43.1 03/07/2025    MCV 94 03/07/2025     03/07/2025     BMP  Lab Results   Component Value Date     03/07/2025    K 4.3 03/07/2025     03/07/2025    CO2 24 03/07/2025    BUN 14 03/07/2025    CREATININE 0.9 03/07/2025    CALCIUM 9.2 03/07/2025    ANIONGAP 10 03/07/2025    ESTGFRAFRICA >60.0 02/25/2022    EGFRNONAA >60.0 02/25/2022     CMP  Sodium   Date Value Ref Range Status   03/07/2025 140 136 - 145 mmol/L Final     Potassium   Date Value Ref Range Status   03/07/2025 4.3 3.5 - 5.1 mmol/L Final     Chloride   Date Value Ref Range Status   03/07/2025 106 95 - 110 mmol/L Final     CO2   Date Value Ref Range Status   03/07/2025 24 23 - 29 mmol/L Final     Glucose   Date Value Ref Range Status   03/07/2025 90 70 - 110 mg/dL Final     BUN   Date Value Ref Range Status   03/07/2025 14 8 - 23 mg/dL Final     Creatinine   Date Value Ref Range Status   03/07/2025 0.9 0.5 - 1.4 mg/dL Final     Calcium   Date Value Ref Range Status   03/07/2025 9.2 8.7 - 10.5 mg/dL Final     Total Protein   Date Value Ref Range Status   03/07/2025 6.6 6.0 - 8.4 g/dL Final     Albumin   Date Value Ref Range Status   03/07/2025 3.7 3.5 - 5.2 g/dL Final     Total Bilirubin   Date Value Ref Range Status   03/07/2025 0.6 0.1 - 1.0 mg/dL Final     Comment:     For infants and newborns, interpretation of results should be based  on gestational age, weight and in agreement with clinical  observations.    Premature Infant recommended reference ranges:  Up to 24 hours.............<8.0 mg/dL  Up to 48 hours............<12.0 mg/dL  3-5  "days..................<15.0 mg/dL  6-29 days.................<15.0 mg/dL       Alkaline Phosphatase   Date Value Ref Range Status   03/07/2025 70 40 - 150 U/L Final     AST   Date Value Ref Range Status   03/07/2025 25 10 - 40 U/L Final     ALT   Date Value Ref Range Status   03/07/2025 14 10 - 44 U/L Final     Anion Gap   Date Value Ref Range Status   03/07/2025 10 8 - 16 mmol/L Final     eGFR if    Date Value Ref Range Status   02/25/2022 >60.0 >60 mL/min/1.73 m^2 Final     eGFR if non    Date Value Ref Range Status   02/25/2022 >60.0 >60 mL/min/1.73 m^2 Final     Comment:     Calculation used to obtain the estimated glomerular filtration  rate (eGFR) is the CKD-EPI equation.        No results found for: "CEA"  No results found for: "CRP"        Assessment:       1. Thrombosed external hemorrhoid        Plan:   Currently not symptomatic aside from minor bleeding  Discussed conservative measures:  -Soaks/sitz baths   -Increased fiber/fluid intake and daily fiber supplement  -Avoid trauma - moistened wipes, avoid straining, avoid spending prolonged periods on sitting on toilet  -Colace 100 mg bid  -RTO 4 weeks  -Patient instructed to call if symptoms have not improved in 48 hrs or if develops fever/chills & worsening pain      Dariel Garcia MD, FACS, FASCRS  , Department of Colon & Rectal Surgery     This note was created using voice recognition software, and may contain some unrecognized transcriptional errors.          [1]   Current Outpatient Medications   Medication Sig Dispense Refill    cetirizine (ZYRTEC) 5 MG tablet Take 1 tablet (5 mg total) by mouth once daily. 90 tablet 3    ELIQUIS 5 mg Tab TAKE 1 TABLET BY MOUTH TWICE A DAY 60 tablet 11    flecainide (TAMBOCOR) 100 MG Tab TAKE 1 TABLET BY MOUTH EVERY 12 HOURS 180 tablet 3    hydrocortisone 2.5 % cream Apply topically 2 (two) times daily. 20 g 3    amLODIPine (NORVASC) 5 MG tablet Take 1 tablet (5 mg total) by " mouth once daily. 90 tablet 3    atorvastatin (LIPITOR) 10 MG tablet Take 1 tablet (10 mg total) by mouth once daily. 90 tablet 3    citalopram (CELEXA) 20 MG tablet Take 1 tablet (20 mg total) by mouth once daily. 90 tablet 3    omeprazole (PRILOSEC) 40 MG capsule Take 1 capsule (40 mg total) by mouth every morning. 90 capsule 3     No current facility-administered medications for this visit.

## 2025-05-28 NOTE — PROGRESS NOTES
"Electrophysiology Clinic Note    Reason for follow-up patient visit: Ongoing evaluation and recommendations regarding persistent atrial fibrillation, s/p successful SOILA and cardioversion on 6/12/2023.     PRESENTING HISTORY:     History of Present Illness:  Mr. Adrian Healy is a alicia 84-year-old gentleman who returns to clinic today for ongoing evaluation and recommendations regarding a persistent atrial fibrillation, s/p successful SOILA and cardioversion on 6/12/2023. He has a past medical history significant for persistent atrial fibrillation noted during an outpatient EGD, s/p successful SOILA and cardioversion on 6/12/2023, GERD with a history of gastric polyps, hypertension, hyperlipidemia, hearing loss, and overweight BMI. Following his cardioversion he was initiated on flecainide 100mg po BID in addition to uninterrupted oral anticoagulation with apixaban, and remains in sinus rhythm on assessment today.     In brief review of his atrial fibrillation history, he presented for an outpatient EGD to monitor his gastric polyps on 5/1/2023. Per chart review, he was noted to be in rate-controlled atrial fibrillation. He was not told that he was in atrial fibrillation at his prior colonoscopy about one month prior in April 2023; however, he does not think they obtained a 12-lead ECG. While in the PACU, he reported that he was asymptomatic. He was subsequently seen in general cardiology with Naye Pillai and Samara on 5/23/2023. At that encounter, he remained in rate-controlled atrial fibrillation. He was started on oral anticoagulation with apixaban and denies any adverse bleeding events. At our initial encounter, we discussed undergoing a SOILA and cardioversion for restoration of sinus rhythm. This was successfully performed on 6/12/2023 with initiation of flecainide 100mg po BID in addition to continued oral anticoagulation with apixaban. Since undergoing his cardioversion, he reports that he feels "much better", " "and that he has increased exercise tolerance and improved shortness of breath. He remains in sinus rhythm on assessment today and has not recorded any events of recurrent atrial fibrillation on review of his serial ECGs. He reports slightly worsening issues with his balance, and his wife is concerned about his risk for falls around the house. He would be very interested in another course of physical therapy to improve his balance.      In discussion with Mr. Healy today, he tells me that he is feeling overall quite well. He denies any episodes of dizziness, lightheadedness, syncope/presyncope, chest pain or chest discomfort, nausea or vomiting, orthopnea, or PND. He reports that when he was in atrial fibrillation, he felt mild exercise intolerance, especially noted with household chores such as mowing his lawn. He was having to take frequent breaks, which was a new symptom for him. He reported very brief "chest flutters" that would last for a matter of seconds. His baseline shortness of breath was worse while in atrial fibrillation. Since undergoing cardioversion he has been able to resume his baseline physical activity without limitation other than his balance issues as above. He can climb more than one flight of stairs prior to needing to take a break, and usually is able to ride his stationary bicycle without limitation. His grandson is getting  later this summer.     Review of Systems:  Review of Systems   Constitutional:  Negative for activity change.        Mild exercise intolerance when he was previously in atrial fibrillation.    HENT:  Negative for nasal congestion, nosebleeds, postnasal drip, rhinorrhea, sinus pressure/congestion, sneezing and sore throat.    Respiratory:  Positive for shortness of breath. Negative for apnea, cough, chest tightness and wheezing.    Cardiovascular:  Negative for chest pain, palpitations and leg swelling.   Gastrointestinal:  Negative for abdominal distention, " abdominal pain, blood in stool, change in bowel habit, constipation, diarrhea, nausea and vomiting.   Genitourinary:  Negative for dysuria and hematuria.   Musculoskeletal:  Positive for arthralgias and back pain. Negative for gait problem.   Neurological:  Negative for dizziness, seizures, syncope, weakness, light-headedness, headaches and coordination difficulties.        Balance issues with postural instability.        PAST HISTORY:     Past Medical History:   Diagnosis Date    GERD (gastroesophageal reflux disease)     HEARING LOSS     Hyperlipidemia     Hypertension        Past Surgical History:   Procedure Laterality Date    COLONOSCOPY N/A 7/19/2018    Procedure: COLONOSCOPY;  Surgeon: Abilio Ferrara MD;  Location: [facility] ENDO (4TH FLR);  Service: Endoscopy;  Laterality: N/A;    COLONOSCOPY N/A 4/27/2022    Procedure: COLONOSCOPY;  Surgeon: Rudy Grace MD;  Location: [facility] (4TH FLR);  Service: Endoscopy;  Laterality: N/A;  fully vaccinated    ECHOCARDIOGRAM,TRANSESOPHAGEAL N/A 6/12/2023    Procedure: Transesophageal echo (SOILA) intra-procedure log documentation;  Surgeon: [facility] Diagnostic Provider;  Location: [facility] EP LAB;  Service: Cardiology;  Laterality: N/A;    ESOPHAGOGASTRODUODENOSCOPY N/A 3/21/2023    Procedure: ESOPHAGOGASTRODUODENOSCOPY (EGD);  Surgeon: Wilmar Ordaz MD;  Location: [facility] (4TH FLR);  Service: Endoscopy;  Laterality: N/A;  inst portal-tb    ESOPHAGOGASTRODUODENOSCOPY N/A 5/1/2023    Procedure: EGD (ESOPHAGOGASTRODUODENOSCOPY);  Surgeon: Ajay Swain MD;  Location: [facility] (2ND FLR);  Service: Endoscopy;  Laterality: N/A;  4/13/23-Instructions via portal-DS  4/25 pre call no answer    ESOPHAGOGASTRODUODENOSCOPY N/A 11/28/2023    Procedure: EGD (ESOPHAGOGASTRODUODENOSCOPY);  Surgeon: Ajay Swain MD;  Location: [facility] (2ND FLR);  Service: Endoscopy;  Laterality: N/A;  Approval to hold Marina rec'd from Dr. Pillai (see telephone encounter  10/27/23)-DS  10/27/23-Instructions via portal-DS  11/22/23-Precall complete-DS    ESOPHAGOGASTRODUODENOSCOPY N/A 12/3/2024    Procedure: EGD (ESOPHAGOGASTRODUODENOSCOPY);  Surgeon: Ajay Swain MD;  Location: [facility] ENDO (2ND FLR);  Service: Endoscopy;  Laterality: N/A;  EMR retreatment.   10/17/24: instructions sent via portal. TE sent to MD Queen, waiting for approval-GD  11/26 PRE-CALL ATTEMPTED-LM/RT  12/2 Precall complete SS    EYE SURGERY      TREATMENT OF CARDIAC ARRHYTHMIA N/A 6/12/2023    Procedure: Cardioversion or Defibrillation;  Surgeon: SHARI Lagunas MD;  Location: [facility] EP LAB;  Service: Cardiology;  Laterality: N/A;  AF, SOILA, DCCV, MAC, EH, 3 Prep       Family History:  Family History   Problem Relation Name Age of Onset    Liver cancer Sister      Melanoma Sister      Cancer Brother      Heart disease Brother          pacemaker    Colon cancer Brother      Prostate cancer Brother         Social History:  He  reports that he quit smoking about 27 years ago. His smoking use included cigarettes. He started smoking about 57 years ago. He has a 30 pack-year smoking history. He quit smokeless tobacco use about 27 years ago. He reports current alcohol use of about 5.8 standard drinks of alcohol per week. He reports that he does not use drugs.      MEDICATIONS & ALLERGIES:     Review of patient's allergies indicates:  No Known Allergies    Current Outpatient Medications on File Prior to Visit   Medication Sig Dispense Refill    amLODIPine (NORVASC) 5 MG tablet Take 1 tablet (5 mg total) by mouth once daily. 90 tablet 3    atorvastatin (LIPITOR) 10 MG tablet Take 1 tablet (10 mg total) by mouth once daily. 90 tablet 3    cetirizine (ZYRTEC) 5 MG tablet Take 1 tablet (5 mg total) by mouth once daily. 90 tablet 3    citalopram (CELEXA) 20 MG tablet Take 1 tablet (20 mg total) by mouth once daily. 90 tablet 3    ELIQUIS 5 mg Tab TAKE 1 TABLET BY MOUTH TWICE A DAY 60 tablet 11    flecainide (TAMBOCOR)  "100 MG Tab TAKE 1 TABLET BY MOUTH EVERY 12 HOURS 180 tablet 3    hydrocortisone 2.5 % cream Apply topically 2 (two) times daily. 20 g 3    omeprazole (PRILOSEC) 40 MG capsule Take 1 capsule (40 mg total) by mouth every morning. 90 capsule 3     No current facility-administered medications on file prior to visit.        OBJECTIVE:     Vital Signs:  /68   Pulse 64   Ht 6' 1" (1.854 m)   Wt 97.2 kg (214 lb 4.6 oz)   BMI 28.27 kg/m²     Physical Exam:  Physical Exam  Constitutional:       General: He is not in acute distress.     Appearance: Normal appearance. He is not ill-appearing or diaphoretic.      Comments: Well-appearing man in NAD.   HENT:      Head: Normocephalic and atraumatic.      Nose: Nose normal.      Mouth/Throat:      Mouth: Mucous membranes are moist.      Pharynx: Oropharynx is clear.   Eyes:      Pupils: Pupils are equal, round, and reactive to light.   Cardiovascular:      Rate and Rhythm: Normal rate and regular rhythm.      Pulses: Normal pulses.      Heart sounds: Normal heart sounds. No murmur heard.     No friction rub. No gallop.   Pulmonary:      Effort: Pulmonary effort is normal. No respiratory distress.      Breath sounds: Normal breath sounds. No wheezing, rhonchi or rales.   Chest:      Chest wall: No tenderness.   Abdominal:      General: There is no distension.      Palpations: Abdomen is soft.      Tenderness: There is no abdominal tenderness.   Musculoskeletal:         General: No swelling or tenderness.      Cervical back: Normal range of motion.      Right lower leg: No edema.      Left lower leg: No edema.   Skin:     General: Skin is warm and dry.      Findings: No erythema, lesion or rash.   Neurological:      General: No focal deficit present.      Mental Status: He is alert and oriented to person, place, and time. Mental status is at baseline.      Motor: No weakness.      Gait: Gait normal.   Psychiatric:         Mood and Affect: Mood normal.         Behavior: " Behavior normal.          Laboratory Data:  Lab Results   Component Value Date    WBC 7.09 03/07/2025    HGB 14.2 03/07/2025    HCT 43.1 03/07/2025    MCV 94 03/07/2025     03/07/2025     Lab Results   Component Value Date    GLU 90 03/07/2025     03/07/2025    K 4.3 03/07/2025     03/07/2025    CO2 24 03/07/2025    BUN 14 03/07/2025    CREATININE 0.9 03/07/2025    CALCIUM 9.2 03/07/2025    MG 1.7 05/01/2023     Lab Results   Component Value Date    INR 1.1 06/09/2023       Pertinent Cardiac Data:  Personal interpretation of today's ECG: Normal sinus rhythm with first degree AV block, rate of 64 bpm,  ms,  ms, QT/QTc 402/414 ms, nonspecific STT changes.     Resting 2D Transthoracic Echocardiogram - 6/9/2023:  The left ventricle is normal in size with mildly decreased systolic function. The estimated ejection fraction is 50%.  Mild right ventricular enlargement with normal right ventricular systolic function.  Severe left atrial enlargement.  The estimated PA systolic pressure is 24 mmHg.  Normal central venous pressure (3 mmHg).  Atrial fibrillation observed.    SOILA and DCCV - 6/12/2023:  SOILA performed prior to DCCV.  Normal appearing left atrial appendage. No thrombus is present in the appendage. Abnormal appendage velocities.  The left ventricle is normal in size with normal systolic function.  The estimated ejection fraction is 50%.  Normal right ventricular size with normal right ventricular systolic function.  Mild mitral regurgitation.  Grade 2 plaque present.      ASSESSMENT & PLAN:   Mr. Adrian Healy is a alicia 84-year-old gentleman who returns to clinic today for ongoing evaluation and recommendations regarding a persistent atrial fibrillation, s/p successful SOILA and cardioversion on 6/12/2023. He has a past medical history significant for persistent atrial fibrillation noted during an outpatient EGD, s/p successful SOILA and cardioversion on 6/12/2023, GERD with a history  of gastric polyps, hypertension, hyperlipidemia, hearing loss, and overweight BMI. Following his cardioversion he was initiated on flecainide 100mg po BID in addition to uninterrupted oral anticoagulation with apixaban, and remains in sinus rhythm on assessment today.     - We discussed the pathophysiology of atrial fibrillation; specifically, we discussed paroxysmal atrial fibrillation as it differs from persistent atrial fibrillation. We discussed that the longer a patient remains in atrial fibrillation, the more challenging it may be for the patient to return and sustain sinus rhythm. We discussed that atrial fibrillation has an increased risk of CVA.    - He feels that since undergoing his successful cardioversion that he has remained in sinus rhythm with first degree AV block and reports that he feel significantly improved. He remains in sinus rhythm on all subsequent ECGs since his cardioversion.   - He remains on flecainide 100mg po BID with excellent control of his atrial fibrillation. Jermain suppression has not been added in the setting of asymptomatic bradycardia. He has no history of underlying coronary artery disease, although this has not been formally assessed. His baseline QRSd remains narrow at 112ms. He has preserved renal function. His systolic function remains preserved with low-normal LVEF 50%. He has not captured any subsequent events of atrial fibrillation on his Optimus3 mobile francis.   - His [id number] is 3 (HTN, male gender, age greater than 75), portending an annual adjusted risk of CVA of 3.2%. He remains on uninterrupted apixaban 5 mg po BID with no adverse bleeding events reported.   - Possible underlying drivers of atrial fibrillation were addressed at this appointment, including recommendations for maintenance of a healthy BMI - now a class I recommendation. Review of laboratory data reveals acceptable TSH at 1.099. We discussed the role that obstructive sleep apnea may play in atrial  fibrillation. A sleep study may be considered at a future encounter to evaluate for possible JACOBO.  - He requests a referral back into physical therapy for assistance with his mobility and postural instability.       This patient will return to clinic with me in six months with continued flecainide antiarrhythmic therapy and apixaban oral anticoagulation. All questions and concerns were addressed at this encounter. Total time spent in this patient encounter: 33 minutes.      Signing Physician:       PAULINE Lagunas MD  Electrophysiology Attending

## 2025-05-29 ENCOUNTER — OFFICE VISIT (OUTPATIENT)
Dept: ELECTROPHYSIOLOGY | Facility: CLINIC | Age: 85
End: 2025-05-29
Payer: MEDICARE

## 2025-05-29 ENCOUNTER — HOSPITAL ENCOUNTER (OUTPATIENT)
Dept: CARDIOLOGY | Facility: CLINIC | Age: 85
Discharge: HOME | End: 2025-05-29
Payer: MEDICARE

## 2025-05-29 VITALS
BODY MASS INDEX: 28.4 KG/M2 | WEIGHT: 214.31 LBS | DIASTOLIC BLOOD PRESSURE: 68 MMHG | HEART RATE: 64 BPM | HEIGHT: 73 IN | SYSTOLIC BLOOD PRESSURE: 118 MMHG

## 2025-05-29 DIAGNOSIS — I48.0 PAROXYSMAL ATRIAL FIBRILLATION: ICD-10-CM

## 2025-05-29 DIAGNOSIS — I48.0 PAROXYSMAL ATRIAL FIBRILLATION: Primary | ICD-10-CM

## 2025-05-29 DIAGNOSIS — I10 PRIMARY HYPERTENSION: ICD-10-CM

## 2025-05-29 DIAGNOSIS — H90.3 SENSORINEURAL HEARING LOSS (SNHL) OF BOTH EARS: ICD-10-CM

## 2025-05-29 DIAGNOSIS — M25.562 CHRONIC PAIN OF LEFT KNEE: ICD-10-CM

## 2025-05-29 DIAGNOSIS — G89.29 CHRONIC PAIN OF RIGHT KNEE: ICD-10-CM

## 2025-05-29 DIAGNOSIS — Z91.81 AT RISK FOR FALLS: ICD-10-CM

## 2025-05-29 DIAGNOSIS — E66.3 OVERWEIGHT (BMI 25.0-29.9): ICD-10-CM

## 2025-05-29 DIAGNOSIS — G89.29 CHRONIC PAIN OF LEFT KNEE: ICD-10-CM

## 2025-05-29 DIAGNOSIS — I48.19 PERSISTENT ATRIAL FIBRILLATION: Primary | ICD-10-CM

## 2025-05-29 DIAGNOSIS — R26.89 BALANCE PROBLEM: ICD-10-CM

## 2025-05-29 DIAGNOSIS — M17.0 PRIMARY OSTEOARTHRITIS OF BOTH KNEES: ICD-10-CM

## 2025-05-29 DIAGNOSIS — E78.2 MIXED HYPERLIPIDEMIA: ICD-10-CM

## 2025-05-29 DIAGNOSIS — K21.9 GASTROESOPHAGEAL REFLUX DISEASE, UNSPECIFIED WHETHER ESOPHAGITIS PRESENT: ICD-10-CM

## 2025-05-29 DIAGNOSIS — M25.561 CHRONIC PAIN OF RIGHT KNEE: ICD-10-CM

## 2025-05-29 DIAGNOSIS — F41.9 ANXIETY: ICD-10-CM

## 2025-05-29 PROBLEM — Z01.810 PREOP CARDIOVASCULAR EXAM: Status: RESOLVED | Noted: 2023-11-07 | Resolved: 2025-05-29

## 2025-05-29 LAB
OHS QRS DURATION: 112 MS
OHS QTC CALCULATION: 414 MS

## 2025-05-29 PROCEDURE — 99214 OFFICE O/P EST MOD 30 MIN: CPT | Mod: S$PBB,,, | Performed by: STUDENT IN AN ORGANIZED HEALTH CARE EDUCATION/TRAINING PROGRAM

## 2025-05-29 PROCEDURE — 93005 ELECTROCARDIOGRAM TRACING: CPT | Mod: PBBFAC | Performed by: INTERNAL MEDICINE

## 2025-05-29 PROCEDURE — 99999 PR PBB SHADOW E&M-EST. PATIENT-LVL III: CPT | Mod: PBBFAC,,, | Performed by: STUDENT IN AN ORGANIZED HEALTH CARE EDUCATION/TRAINING PROGRAM

## 2025-05-29 PROCEDURE — 93010 ELECTROCARDIOGRAM REPORT: CPT | Mod: S$PBB,,, | Performed by: INTERNAL MEDICINE

## 2025-05-29 PROCEDURE — 99213 OFFICE O/P EST LOW 20 MIN: CPT | Mod: PBBFAC,25 | Performed by: STUDENT IN AN ORGANIZED HEALTH CARE EDUCATION/TRAINING PROGRAM

## 2025-06-05 ENCOUNTER — CLINICAL SUPPORT (OUTPATIENT)
Dept: REHABILITATION | Facility: HOSPITAL | Age: 85
End: 2025-06-05
Payer: MEDICARE

## 2025-06-05 DIAGNOSIS — R29.898 WEAKNESS OF LOWER EXTREMITY, UNSPECIFIED LATERALITY: Primary | ICD-10-CM

## 2025-06-05 DIAGNOSIS — R26.89 BALANCE PROBLEM: ICD-10-CM

## 2025-06-05 PROCEDURE — 97161 PT EVAL LOW COMPLEX 20 MIN: CPT

## 2025-06-05 PROCEDURE — 97110 THERAPEUTIC EXERCISES: CPT

## 2025-06-10 ENCOUNTER — CLINICAL SUPPORT (OUTPATIENT)
Dept: REHABILITATION | Facility: HOSPITAL | Age: 85
End: 2025-06-10
Payer: MEDICARE

## 2025-06-10 DIAGNOSIS — R26.89 BALANCE PROBLEM: ICD-10-CM

## 2025-06-10 DIAGNOSIS — R29.898 WEAKNESS OF LOWER EXTREMITY, UNSPECIFIED LATERALITY: Primary | ICD-10-CM

## 2025-06-10 PROCEDURE — 97110 THERAPEUTIC EXERCISES: CPT

## 2025-06-10 NOTE — PROGRESS NOTES
Outpatient Rehab    Physical Therapy Visit    Patient Name: Adrian Healy  MRN: [id number]  YOB: 1940  Encounter Date: 6/10/2025    Therapy Diagnosis:   Encounter Diagnoses   Name Primary?    Weakness of lower extremity, unspecified laterality Yes    Balance problem      Physician: SHARI Lagunas MD    Physician Orders: Eval and Treat  Medical Diagnosis: At risk for falls  Surgical Diagnosis: Not applicable for this Episode   Surgical Date: Not applicable for this Episode    Visit # / Visits Authorized:  1 / 12  Insurance Authorization Period: 6/5/2025 to 12/31/2025  Date of Evaluation: 6/5/2025  Plan of Care Certification: 6/5/2025 to 8/5/2025      PT/PTA: PT   Number of PTA visits since last PT visit:0  Time In: 1400   Time Out: 1450  Total Time (in minutes): 50   Total Billable Time (in minutes): 50    FOTO:  Intake Score:  %  Survey Score 2:  %  Survey Score 3:  %    Precautions:       Subjective   Patient reports no issues since last session. Patient was able to do majority of exercsies at home since last session..  Pain reported as 0/10.      Objective            Treatment:     THERAPEUTIC EXERCISES to develop strength, endurance, ROM, and flexibility for 50 minutes including      Nustep 8 min  Supine Bridge   3 sets - 10 reps - 1 second hold  Clamshell   3 sets - 10 reps - 1 second hold  Supine Active Straight Leg Raise  3 sets - 10 reps  Sit to Stand Without Arm Support   3 sets - 10 reps  Hip Abduction level 2 loop  3 sets - 10 reps  Hip Extension level 2 Loop  3 sets - 10 reps      NEUROMUSCULAR RE-EDUCATION ACTIVITIES to improve Balance, Coordination, Proprioception, Posture, and Motor Control for 0 minutes.  The following were included:        THERAPEUTIC ACTIVITIES to improve dynamic and functional performance for 0 minutes including            Assessment & Plan   Assessment: Patient was able to tolerate review of home program today, but needed breaks in between due to poor  conditioning. Patient did well today, but requested to stop after completion of home program.  Evaluation/Treatment Tolerance: Patient tolerated treatment well    The patient will continue to benefit from skilled outpatient physical therapy in order to address the deficits listed in the problem list on the initial evaluation, provide patient and family education, and maximize the patients level of independence in the home and community environments.     The patient's spiritual, cultural, and educational needs were considered, and the patient is agreeable to the plan of care and goals.           Plan: Cont per poc    Goals:   Active       Ambulation/movement       Patient will accommodate walking on uneven surfaces without falling.  (Progressing)       Start:  06/05/25    Expected End:  08/05/25               Functional outcome       Patient will show a significant change in FOTO patient-reported outcome tool to demonstrate subjective improvement (Progressing)       Start:  06/05/25    Expected End:  08/05/25            Patient stated goal: Improve balance and strength  (Progressing)       Start:  06/05/25    Expected End:  08/05/25            Patient will demonstrate independence in home program for support of progression (Progressing)       Start:  06/05/25    Expected End:  08/05/25               Strength       Patient will improve bilateral LE strength by >20%  (Progressing)       Start:  06/05/25    Expected End:  08/05/25                Raza Saldana PT, DPT

## 2025-06-12 ENCOUNTER — CLINICAL SUPPORT (OUTPATIENT)
Dept: REHABILITATION | Facility: HOSPITAL | Age: 85
End: 2025-06-12
Payer: MEDICARE

## 2025-06-12 DIAGNOSIS — R29.898 WEAKNESS OF LOWER EXTREMITY, UNSPECIFIED LATERALITY: Primary | ICD-10-CM

## 2025-06-12 DIAGNOSIS — R26.89 BALANCE PROBLEM: ICD-10-CM

## 2025-06-12 PROCEDURE — 97110 THERAPEUTIC EXERCISES: CPT

## 2025-06-12 NOTE — PROGRESS NOTES
Outpatient Rehab    Physical Therapy Visit    Patient Name: Adrian Healy  MRN: [id number]  YOB: 1940  Encounter Date: 6/12/2025    Therapy Diagnosis:   Encounter Diagnoses   Name Primary?    Weakness of lower extremity, unspecified laterality Yes    Balance problem      Physician: SHARI Lagunas MD    Physician Orders: Eval and Treat  Medical Diagnosis: At risk for falls  Surgical Diagnosis: Not applicable for this Episode   Surgical Date: Not applicable for this Episode    Visit # / Visits Authorized:  2 / 12  Insurance Authorization Period: 6/5/2025 to 12/31/2025  Date of Evaluation: 6/5/2025  Plan of Care Certification: 6/5/2025 to 8/5/2025      PT/PTA: PT   Number of PTA visits since last PT visit:0  Time In: 1000   Time Out: 1050  Total Time (in minutes): 50   Total Billable Time (in minutes): 23    FOTO:  Intake Score:  %  Survey Score 2:  %  Survey Score 3:  %    Precautions:       Subjective   Patient reports no issues since last session. Patient was progressed with LAQ today and did well.  Pain reported as 0/10.      Objective            Treatment:     THERAPEUTIC EXERCISES to develop strength, endurance, ROM, and flexibility for 50 minutes including      Nustep 8 min  Supine Bridge   3 sets - 10 reps - 1 second hold  Clamshell   3 sets - 10 reps - 1 second hold  Supine Active Straight Leg Raise  3 sets - 10 reps  Sit to Stand Without Arm Support   3 sets - 10 reps  Hip Abduction level 2 loop  3 sets - 10 reps  Hip Extension level 2 Loop  3 sets - 10 reps  LAQ 5# 3x10       NEUROMUSCULAR RE-EDUCATION ACTIVITIES to improve Balance, Coordination, Proprioception, Posture, and Motor Control for 0 minutes.  The following were included:        THERAPEUTIC ACTIVITIES to improve dynamic and functional performance for 0 minutes including            Assessment & Plan   Assessment: Patient was able to tolerate all exercsies today, but needed breaks in between due to poor conditioning. Patient did  well today. added LAQ today  Evaluation/Treatment Tolerance: Patient tolerated treatment well    The patient will continue to benefit from skilled outpatient physical therapy in order to address the deficits listed in the problem list on the initial evaluation, provide patient and family education, and maximize the patients level of independence in the home and community environments.     The patient's spiritual, cultural, and educational needs were considered, and the patient is agreeable to the plan of care and goals.           Plan: Cont per poc    Goals:   Active       Ambulation/movement       Patient will accommodate walking on uneven surfaces without falling.  (Progressing)       Start:  06/05/25    Expected End:  08/05/25               Functional outcome       Patient will show a significant change in FOTO patient-reported outcome tool to demonstrate subjective improvement (Progressing)       Start:  06/05/25    Expected End:  08/05/25            Patient stated goal: Improve balance and strength  (Progressing)       Start:  06/05/25    Expected End:  08/05/25            Patient will demonstrate independence in home program for support of progression (Progressing)       Start:  06/05/25    Expected End:  08/05/25               Strength       Patient will improve bilateral LE strength by >20%  (Progressing)       Start:  06/05/25    Expected End:  08/05/25                Raza Saldana, PT, DPT

## 2025-07-18 ENCOUNTER — OFFICE VISIT (OUTPATIENT)
Dept: DERMATOLOGY | Facility: CLINIC | Age: 85
End: 2025-07-18
Payer: MEDICARE

## 2025-07-18 DIAGNOSIS — D48.5 NEOPLASM OF UNCERTAIN BEHAVIOR OF SKIN: Primary | ICD-10-CM

## 2025-07-18 DIAGNOSIS — L72.9 CYST OF SKIN: ICD-10-CM

## 2025-07-18 PROCEDURE — 99999 PR PBB SHADOW E&M-EST. PATIENT-LVL III: CPT | Mod: PBBFAC,,, | Performed by: PHYSICIAN ASSISTANT

## 2025-07-18 PROCEDURE — 99213 OFFICE O/P EST LOW 20 MIN: CPT | Mod: PBBFAC | Performed by: PHYSICIAN ASSISTANT

## 2025-07-18 NOTE — PROGRESS NOTES
Subjective:      Patient ID:  Adrian Healy is a 85 y.o. male who presents for   Chief Complaint   Patient presents with    Cyst     Patient here today for Cyst. Denies personal hx of skin cancer. Reports sister had ocular melanoma.     Patient with new area of concern:   Location: back of head (previous cyst)  Duration: 3 years  S/S: bleeding, scabby, sore  Previous treatments: Had cyst previously removed    Also has cyst on neck that he would like removed.       Review of Systems   Skin:  Negative for itching, rash and dry skin.       Objective:   Physical Exam   Constitutional: He appears well-developed and well-nourished. No distress.   Neurological: He is alert and oriented to person, place, and time. He is not disoriented.   Psychiatric: He has a normal mood and affect.   Skin:   Areas Examined (abnormalities noted in diagram):   Head / Face Inspection Performed                  Diagram Legend     Erythematous scaling macule/papule c/w actinic keratosis       Vascular papule c/w angioma      Pigmented verrucoid papule/plaque c/w seborrheic keratosis      Yellow umbilicated papule c/w sebaceous hyperplasia      Irregularly shaped tan macule c/w lentigo     1-2 mm smooth white papules consistent with Milia      Movable subcutaneous cyst with punctum c/w epidermal inclusion cyst      Subcutaneous movable cyst c/w pilar cyst      Firm pink to brown papule c/w dermatofibroma      Pedunculated fleshy papule(s) c/w skin tag(s)      Evenly pigmented macule c/w junctional nevus     Mildly variegated pigmented, slightly irregular-bordered macule c/w mildly atypical nevus      Flesh colored to evenly pigmented papule c/w intradermal nevus       Pink pearly papule/plaque c/w basal cell carcinoma      Erythematous hyperkeratotic cursted plaque c/w SCC      Surgical scar with no sign of skin cancer recurrence      Open and closed comedones      Inflammatory papules and pustules      Verrucoid papule consistent consistent  "with wart     Erythematous eczematous patches and plaques     Dystrophic onycholytic nail with subungual debris c/w onychomycosis     Umbilicated papule    Erythematous-base heme-crusted tan verrucoid plaque consistent with inflamed seborrheic keratosis     Erythematous Silvery Scaling Plaque c/w Psoriasis     See annotation      Assessment / Plan:      Pathology Orders:       Normal Orders This Visit    Specimen to Pathology, Dermatology     Questions:    Procedure Type: Dermatology and skin neoplasms    Number of Specimens: 1    ------------------------: -------------------------    Spec 1 Procedure: Shave Biopsy    Spec 1 Clinical Impression: R/o pilar cyst vs SCC vs other    Spec 1 Source: Left posterior scalp    Clinical Information: see EPIC    Clinical History: see EPIC    Specimen Source: Skin    Release to patient: Immediate    Send normal result to authorizing provider's In Basket if patient is active on MyChart: Yes          Neoplasm of uncertain behavior of skin  -     Specimen to Pathology, Dermatology        Shave biopsy procedure note:    Shave biopsy performed after verbal consent including risk of infection, scar, recurrence, need for additional treatment of site. Area prepped with alcohol, anesthetized with approximately 1.0cc of 1% lidocaine with epinephrine. Lesional tissue shaved with razor blade. Hemostasis achieved with application of aluminum chloride followed by hyfrecation. No complications. Dressing applied. Wound care explained.       Cyst of skin      Ambulatory referral/consult to Plastic Surgery           Patient should be seen by the "Expected Date"., Future, Expected: 7/25/2025, Expires: 8/18/2026, Internal Referral, Routine, Specialty Services Required, Plastic Surgery       Follow up for prn per biopsy report, skin check with an MD.    "

## 2025-07-18 NOTE — PATIENT INSTRUCTIONS
" Shave Biopsy Wound Care    Your doctor has performed a shave biopsy today.  A band aid and vaseline ointment has been placed over the site.  This should remain in place for NO LONGER THAN 48 hours.  It is fine to remove the bandaid after 24 hours, if the area is no longer bleeding. It is recommended that you keep the area dry (do not wet)) for the first 24 hours.  After 24 hours, wash the area with warm soap and water and apply Vaseline jelly.  Many patients prefer to use Neosporin or Bacitracin ointment.  This is acceptable; however, know that you can develop an allergy to this medication even if you have used it safely for years.  It is important to keep the area moist.  Letting it dry out and get air slows healing time, and will worsen the scar.        If you notice increasing redness, tenderness, pain, or yellow drainage at the biopsy site, please notify your doctor.  These are signs of an infection.    If your biopsy site is bleeding, apply firm pressure for 15 minutes straight.  Repeat for another 15 minutes, if it is still bleeding.   If the surgical site continues to bleed, then please contact your doctor.      For [facility] users:   You will receive your biopsy results in [facility] as soon as they are available. Please be assured that your physician/provider will review your results and will then determine what further treatment, evaluation, or planning is required. You should be contacted by your physician's/provider's office within 5 business days of receiving your results; If not, please reach out to directly. This is one more way [facility] you first.     [facility] [place] [address]/ [phone] FAX/ [email or web address]       Cyst of skin      Ambulatory referral/consult to Plastic Surgery           Patient should be seen by the "Expected Date"., Future, Expected: 7/25/2025, Expires: 8/18/2026, Internal Referral, Routine, Specialty Services Required, Plastic " Surgery       Follow up for prn per biopsy report, skin check with an MD.

## 2025-07-23 ENCOUNTER — PATIENT MESSAGE (OUTPATIENT)
Dept: DERMATOLOGY | Facility: CLINIC | Age: 85
End: 2025-07-23
Payer: MEDICARE

## 2025-08-19 ENCOUNTER — OFFICE VISIT (OUTPATIENT)
Dept: PLASTIC SURGERY | Facility: CLINIC | Age: 85
End: 2025-08-19
Payer: MEDICARE

## 2025-08-19 VITALS
BODY MASS INDEX: 28 KG/M2 | HEART RATE: 70 BPM | SYSTOLIC BLOOD PRESSURE: 110 MMHG | DIASTOLIC BLOOD PRESSURE: 65 MMHG | WEIGHT: 212.19 LBS

## 2025-08-19 DIAGNOSIS — R22.1 NECK MASS: Primary | ICD-10-CM

## 2025-08-19 PROCEDURE — 99213 OFFICE O/P EST LOW 20 MIN: CPT | Mod: PBBFAC | Performed by: SURGERY

## 2025-08-19 PROCEDURE — 99999 PR PBB SHADOW E&M-EST. PATIENT-LVL III: CPT | Mod: PBBFAC,,, | Performed by: SURGERY

## 2025-08-22 DIAGNOSIS — I48.0 PAROXYSMAL ATRIAL FIBRILLATION: ICD-10-CM

## 2025-08-22 RX ORDER — FLECAINIDE ACETATE 100 MG/1
100 TABLET ORAL 2 TIMES DAILY
Qty: 180 TABLET | Refills: 3 | Status: SHIPPED | OUTPATIENT
Start: 2025-08-22

## 2025-08-25 ENCOUNTER — PATIENT MESSAGE (OUTPATIENT)
Dept: ELECTROPHYSIOLOGY | Facility: CLINIC | Age: 85
End: 2025-08-25
Payer: MEDICARE

## (undated) DEVICE — PAD DEFIB CADENCE ADULT R2